# Patient Record
Sex: MALE | Race: OTHER | NOT HISPANIC OR LATINO | URBAN - METROPOLITAN AREA
[De-identification: names, ages, dates, MRNs, and addresses within clinical notes are randomized per-mention and may not be internally consistent; named-entity substitution may affect disease eponyms.]

---

## 2019-03-31 ENCOUNTER — EMERGENCY (EMERGENCY)
Facility: HOSPITAL | Age: 24
LOS: 1 days | Discharge: HOME | End: 2019-03-31
Admitting: EMERGENCY MEDICINE
Payer: SELF-PAY

## 2019-03-31 VITALS
HEART RATE: 65 BPM | SYSTOLIC BLOOD PRESSURE: 152 MMHG | TEMPERATURE: 98 F | DIASTOLIC BLOOD PRESSURE: 105 MMHG | RESPIRATION RATE: 18 BRPM | OXYGEN SATURATION: 98 %
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
## 2019-03-31 DIAGNOSIS — Z79.2 LONG TERM (CURRENT) USE OF ANTIBIOTICS: ICD-10-CM
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
## 2019-03-31 DIAGNOSIS — T15.01XA FOREIGN BODY IN CORNEA, RIGHT EYE, INITIAL ENCOUNTER: ICD-10-CM

## 2019-03-31 DIAGNOSIS — S05.01XA INJURY OF CONJUNCTIVA AND CORNEAL ABRASION WITHOUT FOREIGN BODY, RIGHT EYE, INITIAL ENCOUNTER: ICD-10-CM

## 2019-03-31 DIAGNOSIS — Z79.1 LONG TERM (CURRENT) USE OF NON-STEROIDAL ANTI-INFLAMMATORIES (NSAID): ICD-10-CM
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
## 2019-03-31 PROCEDURE — 99283 EMERGENCY DEPT VISIT LOW MDM: CPT

## 2019-03-31 RX ORDER — IBUPROFEN 200 MG
1 TABLET ORAL
Qty: 30 | Refills: 0
Start: 2019-03-31

## 2019-03-31 RX ORDER — TOBRAMYCIN 0.3 %
2 DROPS OPHTHALMIC (EYE)
Qty: 5 | Refills: 0
Start: 2019-03-31 | End: 2019-04-06

## 2019-03-31 RX ORDER — IBUPROFEN 200 MG
600 TABLET ORAL ONCE
Qty: 0 | Refills: 0 | Status: COMPLETED | OUTPATIENT
Start: 2019-03-31 | End: 2019-03-31

## 2019-03-31 RX ADMIN — Medication 600 MILLIGRAM(S): at 22:27

## 2019-03-31 NOTE — ED ADULT NURSE NOTE - NSIMPLEMENTINTERV_GEN_ALL_ED
Implemented All Universal Safety Interventions:  [place] to call system. Call bell, personal items and telephone within reach. Instruct patient to call for assistance. Room bathroom lighting operational. Non-slip footwear when patient is off stretcher. Physically safe environment: no spills, clutter or unnecessary equipment. Stretcher in lowest position, wheels locked, appropriate side rails in place.

## 2019-03-31 NOTE — ED PROVIDER NOTE - EYES, MLM
Clear bilaterally, pupils equal, round and reactive to light. +Minor corneal abrasion at 3 o clock of medial aspect of conjunctiva. Right eye vision: 20/20. No foreign body. +slight conjunctival erythema, no hyphema, no chemosis, no discharge.

## 2019-03-31 NOTE — ED PROVIDER NOTE - CARE PROVIDER_API CALL
Cesar Colin)  Ophthalmology  [address] [address]  Phone: [phone]  Fax: [phone]  Follow Up Time:

## 2019-03-31 NOTE — ED ADULT NURSE NOTE - CHIEF COMPLAINT QUOTE
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 Pt states he is sensitive to light. Pt denies blurry vision. Desi  id [id number].

## 2019-03-31 NOTE — ED PROVIDER NOTE - CLINICAL SUMMARY MEDICAL DECISION MAKING FREE TEXT BOX
Will Rx Tobramycin Ophthalmic, Motrin and instructions to F/U with Dr. Colin (Ophalmology) in 24 hours for reeval. Strict return precautions reviewed with pt in which pt verbalizes understanding and agrees to.

## 2019-03-31 NOTE — ED ADULT NURSE REASSESSMENT NOTE - NS ED NURSE REASSESS COMMENT FT1
used [facility] interpetreter service [id number] to go over medicine and discharge papers

## 2019-03-31 NOTE — ED PROVIDER NOTE - OBJECTIVE STATEMENT
23 y o male with no significant PMHx of presents to ED with c/o right eye pain, swelling, and tearing after getting debris in his eye yesterday. States he was working home and grinding a stone. Notes some associated sensitivity to light in the right eye. Denies blurry vision, headache, N/V, or other sx. Last tetanus December 2018. NKDA.

## 2019-03-31 NOTE — ED ADULT TRIAGE NOTE - CHIEF COMPLAINT QUOTE
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 Pt states he is sensitive to light. Pt denies blurry vision. Desi  id [id number].

## 2019-08-14 ENCOUNTER — EMERGENCY (EMERGENCY)
Facility: HOSPITAL | Age: 24
LOS: 1 days | Discharge: HOME | End: 2019-08-14
Admitting: EMERGENCY MEDICINE
Payer: SELF-PAY

## 2019-08-14 VITALS
OXYGEN SATURATION: 98 % | HEART RATE: 70 BPM | DIASTOLIC BLOOD PRESSURE: 78 MMHG | SYSTOLIC BLOOD PRESSURE: 145 MMHG | RESPIRATION RATE: 17 BRPM

## 2019-08-14 VITALS
TEMPERATURE: 98 F | DIASTOLIC BLOOD PRESSURE: 94 MMHG | OXYGEN SATURATION: 98 % | SYSTOLIC BLOOD PRESSURE: 154 MMHG | RESPIRATION RATE: 18 BRPM | HEART RATE: 65 BPM

## 2019-08-14 PROBLEM — Z78.9 OTHER SPECIFIED HEALTH STATUS: Chronic | Status: ACTIVE | Noted: 2019-03-31

## 2019-08-14 LAB
ALBUMIN SERPL ELPH-MCNC: 4.5 G/DL — SIGNIFICANT CHANGE UP (ref 3.4–5)
ALP SERPL-CCNC: 58 U/L — SIGNIFICANT CHANGE UP (ref 40–120)
ALT FLD-CCNC: 21 U/L — SIGNIFICANT CHANGE UP (ref 12–42)
ANION GAP SERPL CALC-SCNC: 6 MMOL/L — LOW (ref 9–16)
APTT BLD: 30 SEC — SIGNIFICANT CHANGE UP (ref 27.5–36.3)
AST SERPL-CCNC: 19 U/L — SIGNIFICANT CHANGE UP (ref 15–37)
BASOPHILS NFR BLD AUTO: 0.4 % — SIGNIFICANT CHANGE UP (ref 0–2)
BILIRUB SERPL-MCNC: 2.2 MG/DL — HIGH (ref 0.2–1.2)
BUN SERPL-MCNC: 15 MG/DL — SIGNIFICANT CHANGE UP (ref 7–23)
CALCIUM SERPL-MCNC: 9.3 MG/DL — SIGNIFICANT CHANGE UP (ref 8.5–10.5)
CHLORIDE SERPL-SCNC: 104 MMOL/L — SIGNIFICANT CHANGE UP (ref 96–108)
CO2 SERPL-SCNC: 29 MMOL/L — SIGNIFICANT CHANGE UP (ref 22–31)
CREAT SERPL-MCNC: 0.84 MG/DL — SIGNIFICANT CHANGE UP (ref 0.5–1.3)
EOSINOPHIL NFR BLD AUTO: 0.8 % — SIGNIFICANT CHANGE UP (ref 0–6)
GLUCOSE SERPL-MCNC: 110 MG/DL — HIGH (ref 70–99)
HCT VFR BLD CALC: 44.5 % — SIGNIFICANT CHANGE UP (ref 39–50)
HGB BLD-MCNC: 15.9 G/DL — SIGNIFICANT CHANGE UP (ref 13–17)
IMM GRANULOCYTES NFR BLD AUTO: 0.3 % — SIGNIFICANT CHANGE UP (ref 0–1.5)
INR BLD: 1.14 — SIGNIFICANT CHANGE UP (ref 0.88–1.16)
LYMPHOCYTES # BLD AUTO: 16.1 % — SIGNIFICANT CHANGE UP (ref 13–44)
MCHC RBC-ENTMCNC: 31.5 PG — SIGNIFICANT CHANGE UP (ref 27–34)
MCHC RBC-ENTMCNC: 35.7 G/DL — SIGNIFICANT CHANGE UP (ref 32–36)
MCV RBC AUTO: 88.1 FL — SIGNIFICANT CHANGE UP (ref 80–100)
MONOCYTES NFR BLD AUTO: 6.3 % — SIGNIFICANT CHANGE UP (ref 2–14)
NEUTROPHILS NFR BLD AUTO: 76.1 % — SIGNIFICANT CHANGE UP (ref 43–77)
PLATELET # BLD AUTO: 264 K/UL — SIGNIFICANT CHANGE UP (ref 150–400)
POTASSIUM SERPL-MCNC: 3.9 MMOL/L — SIGNIFICANT CHANGE UP (ref 3.5–5.3)
POTASSIUM SERPL-SCNC: 3.9 MMOL/L — SIGNIFICANT CHANGE UP (ref 3.5–5.3)
PROT SERPL-MCNC: 7.7 G/DL — SIGNIFICANT CHANGE UP (ref 6.4–8.2)
PROTHROM AB SERPL-ACNC: 12.7 SEC — SIGNIFICANT CHANGE UP (ref 10–12.9)
RBC # BLD: 5.05 M/UL — SIGNIFICANT CHANGE UP (ref 4.2–5.8)
RBC # FLD: 12.2 % — SIGNIFICANT CHANGE UP (ref 10.3–14.5)
SODIUM SERPL-SCNC: 139 MMOL/L — SIGNIFICANT CHANGE UP (ref 132–145)
WBC # BLD: 10.6 K/UL — HIGH (ref 3.8–10.5)
WBC # FLD AUTO: 10.6 K/UL — HIGH (ref 3.8–10.5)

## 2019-08-14 PROCEDURE — 70450 CT HEAD/BRAIN W/O DYE: CPT | Mod: 26

## 2019-08-14 PROCEDURE — 99284 EMERGENCY DEPT VISIT MOD MDM: CPT

## 2019-08-14 RX ORDER — SODIUM CHLORIDE 9 MG/ML
1000 INJECTION INTRAMUSCULAR; INTRAVENOUS; SUBCUTANEOUS ONCE
Refills: 0 | Status: COMPLETED | OUTPATIENT
Start: 2019-08-14 | End: 2019-08-14

## 2019-08-14 RX ORDER — METOCLOPRAMIDE HCL 10 MG
10 TABLET ORAL ONCE
Refills: 0 | Status: COMPLETED | OUTPATIENT
Start: 2019-08-14 | End: 2019-08-14

## 2019-08-14 RX ADMIN — Medication 10 MILLIGRAM(S): at 13:31

## 2019-08-14 RX ADMIN — SODIUM CHLORIDE 1000 MILLILITER(S): 9 INJECTION INTRAMUSCULAR; INTRAVENOUS; SUBCUTANEOUS at 13:31

## 2019-08-14 NOTE — ED ADULT NURSE NOTE - NSIMPLEMENTINTERV_GEN_ALL_ED
Implemented All Universal Safety Interventions:  [place] to call system. Call bell, personal items and telephone within reach. Instruct patient to call for assistance. Room bathroom lighting operational. Non-slip footwear when patient is off stretcher. Physically safe environment: no spills, clutter or unnecessary equipment. Stretcher in lowest position, wheels locked, appropriate side rails in place.

## 2019-08-14 NOTE — ED PROVIDER NOTE - NEUROLOGICAL, MLM
Patient is alert, oriented x person, place and time.  Cranial nerves 2-12 are intact.  Normal gait and speech.  Cerebellar testing normal:  negative Romberg, normal coordination and normal finger to nose, heal to shin and rapid alternating movements.  Normal proprioception and sensory exam.  No pronator drift.

## 2019-08-14 NOTE — ED PROVIDER NOTE - PROGRESS NOTE DETAILS
pain and nausea resolved after reglan. discuss results including elevated bili. will d/c to follow up with pmd. tolerating PO

## 2019-08-14 NOTE — ED PROVIDER NOTE - NSFOLLOWUPINSTRUCTIONS_ED_ALL_ED_FT
FOLLOW UP WITH YOUR PMD IN 2-3 DAYS. CALL TODAY FOR AN APPOINTMENT. TAKE COPY OF YOUR RESULTS WITH YOU TO DISCUSS WITH YOUR PMD, INCLUDING YOUR ELEVATED BILIRUBIN.     RETURN TO ER FOR ANY NEW OR CONCERNING SYMPTOMS.     Headache    A headache is pain or discomfort felt around the head or neck area. The specific cause of a headache may not be found as there are many types including tension headaches, migraine headaches, and cluster headaches. Watch your condition for any changes. Things you can do to manage your pain include taking over the counter and prescription medications as instructed by your health care provider, lying down in a dark quiet room, limiting stress, getting regular sleep, and refraining from alcohol and tobacco products.    SEEK IMMEDIATE MEDICAL CARE IF YOU HAVE ANY OF THE FOLLOWING SYMPTOMS: fever, vomiting, stiff neck, loss of vision, problems with speech, muscle weakness, loss of balance, trouble walking, passing out, or confusion.

## 2019-08-14 NOTE — ED ADULT NURSE NOTE - CHPI ED NUR SYMPTOMS NEG
no fever/no loss of consciousness/no dizziness/no numbness/no confusion/no weakness/no blurred vision/no change in level of consciousness

## 2019-08-14 NOTE — ED ADULT TRIAGE NOTE - CHIEF COMPLAINT QUOTE
Pt. Desi speaking  service Jenny #[id number] used for triage. Pt. c/o headache x2days accompanied by 2 episodes of vomiting today. Pt. denies any fall or head trauma. No relief with advil at home.

## 2019-08-14 NOTE — ED PROVIDER NOTE - OBJECTIVE STATEMENT
22 y/o otherwise Male presents to the ED with c/o a headache with nausea and vomiting. He reports a history of headaches but not typically with vomiting. He has not had any imaging done to evaluate his headaches. Endorses photophobia, feeling shaky and fatigue. Denies any bloody emesis, epistaxis, CP, SOB, abdominal pain, dizziness, vision changes, neck pain, back pain, numbness, tingling, fever, chills, gait changes, trauma, falls, alcohol or drug use. He took 2 Advil's at 9am today which did not provide any relief. :  [id number]

## 2019-08-14 NOTE — ED ADULT NURSE NOTE - OBJECTIVE STATEMENT
28 y/o male with headache since midnight last night associated with nausea and vomiting- Pt arrives A+Ox3 denies head injury, trauma or change in vision

## 2019-08-14 NOTE — ED ADULT NURSE NOTE - CHIEF COMPLAINT QUOTE
Pt. Desi speaking  service Jenny #[id number] used for triage. Pt. c/o headache x2days accompanied by 2 episodes of vomiting today. Pt. denies any fall or head trauma. No relief with advil at home.

## 2019-08-18 DIAGNOSIS — Z79.1 LONG TERM (CURRENT) USE OF NON-STEROIDAL ANTI-INFLAMMATORIES (NSAID): ICD-10-CM

## 2019-08-18 DIAGNOSIS — Z79.2 LONG TERM (CURRENT) USE OF ANTIBIOTICS: ICD-10-CM

## 2019-08-18 DIAGNOSIS — R51 HEADACHE: ICD-10-CM

## 2019-08-18 DIAGNOSIS — R11.2 NAUSEA WITH VOMITING, UNSPECIFIED: ICD-10-CM

## 2020-02-05 ENCOUNTER — EMERGENCY (EMERGENCY)
Facility: HOSPITAL | Age: 25
LOS: 1 days | Discharge: HOME | End: 2020-02-05
Attending: EMERGENCY MEDICINE | Admitting: EMERGENCY MEDICINE
Payer: MEDICAID

## 2020-02-05 VITALS
HEIGHT: 60 IN | TEMPERATURE: 98 F | HEART RATE: 65 BPM | DIASTOLIC BLOOD PRESSURE: 97 MMHG | WEIGHT: 158.73 LBS | RESPIRATION RATE: 16 BRPM | OXYGEN SATURATION: 100 % | SYSTOLIC BLOOD PRESSURE: 144 MMHG

## 2020-02-05 VITALS
SYSTOLIC BLOOD PRESSURE: 105 MMHG | HEART RATE: 56 BPM | OXYGEN SATURATION: 98 % | TEMPERATURE: 98 F | RESPIRATION RATE: 18 BRPM | DIASTOLIC BLOOD PRESSURE: 73 MMHG

## 2020-02-05 DIAGNOSIS — G44.209 TENSION-TYPE HEADACHE, UNSPECIFIED, NOT INTRACTABLE: ICD-10-CM

## 2020-02-05 DIAGNOSIS — R51 HEADACHE: ICD-10-CM

## 2020-02-05 PROCEDURE — 99283 EMERGENCY DEPT VISIT LOW MDM: CPT

## 2020-02-05 RX ORDER — IBUPROFEN 200 MG
600 TABLET ORAL ONCE
Refills: 0 | Status: COMPLETED | OUTPATIENT
Start: 2020-02-05 | End: 2020-02-05

## 2020-02-05 RX ORDER — ACETAMINOPHEN 500 MG
650 TABLET ORAL ONCE
Refills: 0 | Status: COMPLETED | OUTPATIENT
Start: 2020-02-05 | End: 2020-02-05

## 2020-02-05 RX ADMIN — Medication 600 MILLIGRAM(S): at 16:08

## 2020-02-05 RX ADMIN — Medication 650 MILLIGRAM(S): at 16:08

## 2020-02-05 NOTE — ED PROVIDER NOTE - CLINICAL SUMMARY MEDICAL DECISION MAKING FREE TEXT BOX
23 y/o M presents to ED c/o headache. Exam unremarkable, VS stable. 23 y/o M presents to ED c/o headache. Exam unremarkable, VS stable. Pt likely has tension headache. No clinical indication warranting a CT scan. Will give Ib Profen and Acetaminophen, discharge home. 23 y/o M presents to ED c/o headache.  Exam unremarkable, VS stable. Pt likely has tension headache. No clinical indication warranting a CT scan. Will give Ib Profen and Acetaminophen, discharge home.

## 2020-02-05 NOTE — ED PROVIDER NOTE - PATIENT PORTAL LINK FT
You can access the FollowMyHealth Patient Portal offered by [facility] by registering at the following website: [email or web address]. By joining [employer]’s FollowMyHealth portal, you will also be able to view your health information using other applications (apps) compatible with our system.

## 2020-02-05 NOTE — ED PROVIDER NOTE - NSFOLLOWUPINSTRUCTIONS_ED_ALL_ED_FT
Tension Headache, Adult  A tension headache is a feeling of pain, pressure, or aching in the head that is often felt over the front and sides of the head. The pain can be dull, or it can feel tight (constricting). There are two types of tension headache:  Episodic tension headache. This is when the headaches happen fewer than 15 days a month.Chronic tension headache. This is when the headaches happen more than 15 days a month during a 3-month period.A tension headache can last from 30 minutes to several days. It is the most common kind of headache. Tension headaches are not normally associated with nausea or vomiting, and they do not get worse with physical activity.  What are the causes?  The exact cause of this condition is not known. Tension headaches are often triggered by stress, anxiety, or depression. Other triggers include:  Alcohol.Too much caffeine or caffeine withdrawal.Respiratory infections, such as colds, flu, or sinus infections.Dental problems or teeth clenching.Tiredness (fatigue).Holding your head and neck in the same position for a long period of time, such as while using a computer.Smoking.Arthritis of the neck.What are the signs or symptoms?  Symptoms of this condition include:  A feeling of pressure or tightness around the head.Dull, aching head pain.Pain over the front and sides of the head.Tenderness in the muscles of the head, neck, and shoulders.How is this diagnosed?  This condition may be diagnosed based on your symptoms, your medical history, and a physical exam. If your symptoms are severe or unusual, you may have imaging tests, such as a CT scan or an MRI of your head. Your vision may also be checked.  How is this treated?  This condition may be treated with lifestyle changes and with medicines that help relieve symptoms.  Follow these instructions at home:  Managing pain     Take over-the-counter and prescription medicines only as told by your health care provider.When you have a headache, lie down in a dark, quiet room.If directed, apply ice to the head and neck:  Put ice in a plastic bag.Place a towel between your skin and the bag.Leave the ice on for 20 minutes, 2–3 times a day.If directed, apply heat to the back of your neck as often as told by your health care provider. Use the heat source that your health care provider recommends, such as a moist heat pack or a heating pad.  Place a towel between your skin and the heat source.Leave the heat on for 20–30 minutes.Remove the heat if your skin turns bright red. This is especially important if you are unable to feel pain, heat, or cold. You may have a greater risk of getting burned.Eating and drinking     Eat meals on a regular schedule.Limit alcohol intake to no more than 1 drink a day for nonpregnant women and 2 drinks a day for men. One drink equals 12 oz of beer, 5 oz of wine, or 1½ oz of hard liquor.Drink enough fluid to keep your urine pale yellow.Decrease your caffeine intake, or stop using caffeine.Lifestyle     Get 7–9 hours of sleep each night, or get the amount of sleep recommended by your health care provider.At bedtime, remove all electronic devices from your room. Electronic devices include computers, phones, and tablets.Find ways to manage your stress. Some things that can help relieve stress include:  Exercise.Deep breathing exercises.Yoga.Listening to music.Positive mental imagery.Try to sit up straight and avoid tensing your muscles.Do not use any products that contain nicotine or tobacco, such as cigarettes and e-cigarettes. If you need help quitting, ask your health care provider.General instructions        Keep all follow-up visits as told by your health care provider. This is important.Avoid any headache triggers. Keep a headache journal to help find out what may trigger your headaches. For example, write down:  What you eat and drink.How much sleep you get.Any change to your diet or medicines.Contact a health care provider if:  Your headache does not get better.Your headache comes back.You are sensitive to sounds, light, or smells because of a headache.You have nausea or you vomit.Your stomach hurts.Get help right away if:  You suddenly develop a very severe headache along with any of the following:  A stiff neck.Nausea and vomiting.Confusion.Weakness.Double vision or loss of vision.Shortness of breath.Rash.Unusual sleepiness.Fever.Trouble speaking.Pain in your eyes or ears.Trouble walking or balancing.Feeling faint or passing out.Summary  A tension headache is a feeling of pain, pressure, or aching in the head that is often felt over the front and sides of the head.A tension headache can last from 30 minutes to several days. It is the most common kind of headache.This condition may be diagnosed based on your symptoms, your medical history, and a physical exam.This condition may be treated with lifestyle changes and with medicines that help relieve symptoms.This information is not intended to replace advice given to you by your health care provider. Make sure you discuss any questions you have with your health care provider.    Document Released: 12/18/2006 Document Revised: 03/30/2018 Document Reviewed: 03/30/2018  [employer] Interactive Patient Education © 2019 [employer]

## 2020-02-05 NOTE — ED PROVIDER NOTE - NEUROLOGICAL, MLM
Patient is alert, oriented x person, place and time.  Cranial nerves 2-12 are intact.  5/5 bl upper extremity and lower extremity strength.

## 2020-02-05 NOTE — ED PROVIDER NOTE - OBJECTIVE STATEMENT
23 y/o M with no PMHx presents to ED c/o burning frontal and temporal headaches for the past 2 weeks. Pt speaks Swedish; history was taken via  (ID: [id number]). Pt took Paracetamol at home which improved his sx. Last Paracetamol intake was 5 days ago. Denies head trama, blurred vision, fever, chills, N/V, abd pain, chest pain, SOB, numbness or tingling of extremities, back pain, neck pain, sick contacts, or recent travel.

## 2020-02-05 NOTE — ED ADULT TRIAGE NOTE - CHIEF COMPLAINT QUOTE
walk in with c/o HA x2weeks, constant, no hx of similar, no n/v or blurry vision, no photophobia, no head injury.

## 2020-02-05 NOTE — ED PROVIDER NOTE - CHPI ED SYMPTOMS NEG
no nausea/no numbness/no blurred vision/no fever/no vomiting/no chills, no abd pain, no chest pain, no SOB, no tingling, no back pain, no neck pain

## 2020-10-20 NOTE — ED ADULT NURSE NOTE - NS ED NURSE DISCH DISPOSITION
Discharged Dapsone Counseling: I discussed with the patient the risks of dapsone including but not limited to hemolytic anemia, agranulocytosis, rashes, methemoglobinemia, kidney failure, peripheral neuropathy, headaches, GI upset, and liver toxicity.  Patients who start dapsone require monitoring including baseline LFTs and weekly CBCs for the first month, then every month thereafter.  The patient verbalized understanding of the proper use and possible adverse effects of dapsone.  All of the patient's questions and concerns were addressed.

## 2021-01-11 ENCOUNTER — EMERGENCY (EMERGENCY)
Facility: HOSPITAL | Age: 26
LOS: 1 days | Discharge: HOME | End: 2021-01-11
Admitting: EMERGENCY MEDICINE
Payer: MEDICAID

## 2021-01-11 VITALS
OXYGEN SATURATION: 98 % | TEMPERATURE: 98 F | HEIGHT: 60 IN | RESPIRATION RATE: 16 BRPM | HEART RATE: 94 BPM | DIASTOLIC BLOOD PRESSURE: 91 MMHG | SYSTOLIC BLOOD PRESSURE: 157 MMHG

## 2021-01-11 DIAGNOSIS — R51.9 HEADACHE, UNSPECIFIED: ICD-10-CM

## 2021-01-11 DIAGNOSIS — Z20.822 CONTACT WITH AND (SUSPECTED) EXPOSURE TO COVID-19: ICD-10-CM

## 2021-01-11 PROCEDURE — 99284 EMERGENCY DEPT VISIT MOD MDM: CPT

## 2021-01-11 NOTE — ED ADULT TRIAGE NOTE - CHIEF COMPLAINT QUOTE
Desi  PAIGE [id number] [birth year] hx of dizziness, fevers, frontal headache, no family testing positive for covid , nil pmhx,nil meds,

## 2021-01-12 RX ORDER — ACETAMINOPHEN 500 MG
650 TABLET ORAL ONCE
Refills: 0 | Status: COMPLETED | OUTPATIENT
Start: 2021-01-12 | End: 2021-01-12

## 2021-01-12 RX ADMIN — Medication 650 MILLIGRAM(S): at 00:30

## 2021-01-12 NOTE — ED PROVIDER NOTE - OBJECTIVE STATEMENT
25 year old male with no PMhx presents for evaluation. no family testing positive for covid. reports headache.   no recent travel. requesting covid swab.   Denies sore throat, cough, SOB, CP, palpitations, wheezing, abdominal pain, N/V/D/C, change in urinary/bowel function, dysuria, hematuria, flank pain, malaise, rash, HA, and dizziness.  No recent travel or sick contact noted.    Desi  PAIGE [id number]

## 2021-01-12 NOTE — ED ADULT NURSE NOTE - CHIEF COMPLAINT QUOTE
Desi  PAIGE [id number] [birth year] hx of dizziness, fevers, frontal headache, no family testing positive for covid , nil pmhx,nil meds,

## 2021-01-12 NOTE — ED PROVIDER NOTE - NSFOLLOWUPINSTRUCTIONS_ED_ALL_ED_FT
IF YOU DO NOT GET YOUR RESULTS WITHIN 48 HOURS PLEASE CALL [phone].    IF YOUR RESULT COMES BACK POSITIVE:    1. STAY HOME for 14 DAYS  2. Minimize Human contact to ONLY ESSENTIAL  3. Every time you wash your hands, sing the HAPPY BIRTHDAY Song so you know you're washing long enough.  Make sure to scrub the webspace between your fingers.  4. DRINK 1-3 Liters of fluids day x at least 5 days.  To remain hydrated. Your fatigue, lightheadedness, and body aches will decrease and your fever has a better chance of breaking if you are well hydrated.    5. For your Fever and Body aches takes Tylenol 650-100mg every 4-6h (max 4000mg/day). Try not to use ibuprofen, aspirin or naproxen (Advil, Motrin or Aleve) as these may worsen Coronavirus infection.  6. RETURN TO THE ER IMMEDIATELY IF YOU HAVE WORSENING SHORTNESS OF BREATH. SYMPTOMS USUALLY PEAK BETWEEN DAY 7-10.

## 2021-01-12 NOTE — ED PROVIDER NOTE - PHYSICAL EXAMINATION
VITAL SIGNS: I have reviewed nursing notes and confirm.  CONSTITUTIONAL: Well-developed; well-nourished; in no acute distress.  SKIN: Skin is warm and dry, no acute rash.  HEAD: Normocephalic; atraumatic.  EYES: PERRL, EOM intact; conjunctiva and sclera clear.  EARS: tympanic membranes clear bilaterally, No redness, normal landmarks and light reflexes, canal clear without cerumen impaction  THROAT: moist mucous membranes, normal dentition, no erythema, no swelling, no exudates, no drooling, no PTA, uvula midline  NECK: Supple; non tender. no nuchal rigidity   CARD: S1, S2 normal; no murmurs, gallops, or rubs. Regular rate and rhythm.  RESP: No wheezes, rales or rhonchi.  ABD: Normal bowel sounds; soft; non-distended; non-tender;  no palpable or pulsating mass; no hepatosplenomegaly.  EXT: Normal ROM. No clubbing, cyanosis or edema.  NEURO: Alert, oriented. Grossly unremarkable.  PSYCH: Cooperative, appropriate.

## 2021-01-12 NOTE — ED PROVIDER NOTE - CLINICAL SUMMARY MEDICAL DECISION MAKING FREE TEXT BOX
COVID like symptoms. will send swab. tylenol. no sign of nuchal rigidity.  discussed care/symptoms/ quarantine

## 2021-01-12 NOTE — ED PROVIDER NOTE - PATIENT PORTAL LINK FT
You can access the FollowMyHealth Patient Portal offered by [facility] by registering at the following website: [email or web address]. By joining [employer]’s FollowMyHealth portal, you will also be able to view your health information using other applications (apps) compatible with our system.

## 2021-06-04 NOTE — ED ADULT NURSE NOTE - NS ED NOTE ABUSE SUSPICION NEGLECT YN
[Alert] : alert [No Acute Distress] : no acute distress [Normocephalic] : normocephalic [Conjunctivae with no discharge] : conjunctivae with no discharge [PERRL] : PERRL [EOMI Bilateral] : EOMI bilateral [Auricles Well Formed] : auricles well formed [Clear Tympanic membranes with present light reflex and bony landmarks] : clear tympanic membranes with present light reflex and bony landmarks [No Discharge] : no discharge [Nares Patent] : nares patent [Pink Nasal Mucosa] : pink nasal mucosa [Palate Intact] : palate intact [Nonerythematous Oropharynx] : nonerythematous oropharynx [Supple, full passive range of motion] : supple, full passive range of motion [No Palpable Masses] : no palpable masses [Symmetric Chest Rise] : symmetric chest rise [Clear to Auscultation Bilaterally] : clear to auscultation bilaterally [Regular Rate and Rhythm] : regular rate and rhythm [Normal S1, S2 present] : normal S1, S2 present [No Murmurs] : no murmurs [+2 Femoral Pulses] : +2 femoral pulses [Soft] : soft [NonTender] : non tender [Non Distended] : non distended [No Hepatomegaly] : no hepatomegaly [No Splenomegaly] : no splenomegaly [No Abnormal Lymph Nodes Palpated] : no abnormal lymph nodes palpated [No Gait Asymmetry] : no gait asymmetry [Normal Muscle Tone] : normal muscle tone [Straight] : straight [+2 Patella DTR] : +2 patella DTR [No Rash or Lesions] : no rash or lesions [FreeTextEntry6] : External Genitalia: Visual inspection WNL  No [de-identified] : Evaluation for scoliosis:  No scoliosis on exam, ( Normal Wilde Forward Bend Test).

## 2021-07-20 ENCOUNTER — EMERGENCY (EMERGENCY)
Facility: HOSPITAL | Age: 26
LOS: 1 days | Discharge: HOME | End: 2021-07-20
Attending: EMERGENCY MEDICINE | Admitting: EMERGENCY MEDICINE
Payer: MEDICAID

## 2021-07-20 VITALS
OXYGEN SATURATION: 98 % | DIASTOLIC BLOOD PRESSURE: 88 MMHG | RESPIRATION RATE: 16 BRPM | HEART RATE: 82 BPM | TEMPERATURE: 98 F | WEIGHT: 171.96 LBS | SYSTOLIC BLOOD PRESSURE: 152 MMHG | HEIGHT: 60 IN

## 2021-07-20 VITALS
DIASTOLIC BLOOD PRESSURE: 86 MMHG | RESPIRATION RATE: 16 BRPM | SYSTOLIC BLOOD PRESSURE: 139 MMHG | TEMPERATURE: 98 F | OXYGEN SATURATION: 98 % | HEART RATE: 65 BPM

## 2021-07-20 DIAGNOSIS — R51.9 HEADACHE, UNSPECIFIED: ICD-10-CM

## 2021-07-20 DIAGNOSIS — R10.13 EPIGASTRIC PAIN: ICD-10-CM

## 2021-07-20 PROCEDURE — 93010 ELECTROCARDIOGRAM REPORT: CPT

## 2021-07-20 PROCEDURE — 99284 EMERGENCY DEPT VISIT MOD MDM: CPT

## 2021-07-20 RX ORDER — FAMOTIDINE 10 MG/ML
20 INJECTION INTRAVENOUS ONCE
Refills: 0 | Status: COMPLETED | OUTPATIENT
Start: 2021-07-20 | End: 2021-07-20

## 2021-07-20 RX ORDER — ACETAMINOPHEN 500 MG
975 TABLET ORAL ONCE
Refills: 0 | Status: COMPLETED | OUTPATIENT
Start: 2021-07-20 | End: 2021-07-20

## 2021-07-20 RX ORDER — METHOCARBAMOL 500 MG/1
1500 TABLET, FILM COATED ORAL ONCE
Refills: 0 | Status: COMPLETED | OUTPATIENT
Start: 2021-07-20 | End: 2021-07-20

## 2021-07-20 RX ORDER — FAMOTIDINE 10 MG/ML
1 INJECTION INTRAVENOUS
Qty: 14 | Refills: 0
Start: 2021-07-20 | End: 2021-08-02

## 2021-07-20 RX ORDER — LIDOCAINE 4 G/100G
5 CREAM TOPICAL ONCE
Refills: 0 | Status: COMPLETED | OUTPATIENT
Start: 2021-07-20 | End: 2021-07-20

## 2021-07-20 RX ORDER — IBUPROFEN 200 MG
600 TABLET ORAL ONCE
Refills: 0 | Status: COMPLETED | OUTPATIENT
Start: 2021-07-20 | End: 2021-07-20

## 2021-07-20 RX ADMIN — FAMOTIDINE 20 MILLIGRAM(S): 10 INJECTION INTRAVENOUS at 20:02

## 2021-07-20 RX ADMIN — Medication 600 MILLIGRAM(S): at 20:02

## 2021-07-20 RX ADMIN — Medication 30 MILLILITER(S): at 20:01

## 2021-07-20 RX ADMIN — METHOCARBAMOL 1500 MILLIGRAM(S): 500 TABLET, FILM COATED ORAL at 20:27

## 2021-07-20 RX ADMIN — Medication 975 MILLIGRAM(S): at 20:01

## 2021-07-20 RX ADMIN — LIDOCAINE 5 MILLILITER(S): 4 CREAM TOPICAL at 20:02

## 2021-07-20 NOTE — ED PROVIDER NOTE - PATIENT PORTAL LINK FT
You can access the FollowMyHealth Patient Portal offered by [facility] by registering at the following website: [email or web address]. By joining [employer]’s FollowMyHealth portal, you will also be able to view your health information using other applications (apps) compatible with our system.

## 2021-07-20 NOTE — ED ADULT TRIAGE NOTE - BP NONINVASIVE DIASTOLIC (MM HG)
88 Ear Star Wedge Flap Text: The defect edges were debeveled with a #15 blade scalpel.  Given the location of the defect and the proximity to free margins (helical rim) an ear star wedge flap was deemed most appropriate.  Using a sterile surgical marker, the appropriate flap was drawn incorporating the defect and placing the expected incisions between the helical rim and antihelix where possible.  The area thus outlined was incised through and through with a #15 scalpel blade.

## 2021-07-20 NOTE — ED PROVIDER NOTE - PHYSICAL EXAMINATION
VITAL SIGNS: I have reviewed nursing notes and confirm.  CONSTITUTIONAL: Well-developed; well-nourished; in no acute distress.  SKIN: Skin is warm and dry, no acute rash.  HEAD: Normocephalic; atraumatic.  EYES: Pupils round bilaterally, 3mm; conjunctiva and sclera clear.  ENT: No nasal discharge; airway clear, MMM.  NECK: Supple; non tender.  CARD: S1, S2 normal; no murmurs, gallops, or rubs. Regular rate and rhythm.  RESP: No wheezes, rales or rhonchi.  ABD: Soft; non-distended; non-tender; no rebound or guarding.  EXT: Normal ROM.   NEURO: Alert, oriented. Grossly unremarkable. ONEAL, normal tone, no gross motor or sensory changes. Fluent speech.   PSYCH: Cooperative, appropriate. Mood and affect wnl.

## 2021-07-20 NOTE — ED PROVIDER NOTE - NSFOLLOWUPINSTRUCTIONS_ED_ALL_ED_FT
Dhimbje tori    Një dhimbje tori është dhimbje ose shqetësim që ndihet rreth zonës së kokës ose qafës. Shkaku specifik i një jose tori mund të mos gjendet pasi ka shumë lloje morris përfshirë dhjose tori tensioni, dhimbje tori migrene dhe [facility] tori grupore. Shikoni gjendjen tuaj për çdo ndryshim. Gjërat që mund të bëni për të menaxhuar [facility] përfshijnë marrjen e banakut dhe ilaçeve me recetë siç udhëzohet david ofruesi juaj i kujdesit shëndetësor, shtrirja në një dhomë të errët të qetë, kufizimi i stresit, gjumi i rregullt dhe largimi david alkooli dhe produktet e duhanit.    KEKRKONI KUJDES MJEKSOR T IM MENJHERSHM N ISE BRIANDA NDONJ OFN SIMPTOMMSN T F VJETM: ethe, të vjella, ngurtësim të qafës, humbje të shikimit, probleme me të folurën, dobësi të muskujve, humbje të ekuilibrit, probleme në ecje, humbje ose konfuzion.    Për [facility] të barkut:    Ju lutemi ndiqni një dietë të butë.  Merrni dhe bllokues të acidit për 7-14 ditë (famotidine)  Shmangni ushqimet e yndyrshme ose me erëza.    OTC Motrin / Advil (ibuprofen) 600mg çdo 6 orë dhe / ose Tylenol (acetaminophen) 1000mg çdo 6h për [facility].  Kthehuni për [facility] më të keqe, simptoma të reja shqetësuese ose urgjenca të tjera mjekësore.      Headache    A headache is pain or discomfort felt around the head or neck area. The specific cause of a headache may not be found as there are many types including tension headaches, migraine headaches, and cluster headaches. Watch your condition for any changes. Things you can do to manage your pain include taking over the counter and prescription medications as instructed by your health care provider, lying down in a dark quiet room, limiting stress, getting regular sleep, and refraining from alcohol and tobacco products.    SEEK IMMEDIATE MEDICAL CARE IF YOU HAVE ANY OF THE FOLLOWING SYMPTOMS: fever, vomiting, stiff neck, loss of vision, problems with speech, muscle weakness, loss of balance, trouble walking, passing out, or confusion.     For your abdominal pain:     Please follow a bland diet.   Take and acid blocker for 7-14 days (famotidine)  Avoid greasy or spice foods.     OTC Motrin/Advil (ibuprofen) 600mg every 6h and/or Tylenol (acetaminophen) 1000mg every 6h for pain.   Return for worse pain, new worrisome symptoms or other medical emergencies.

## 2021-07-20 NOTE — ED PROVIDER NOTE - CLINICAL SUMMARY MEDICAL DECISION MAKING FREE TEXT BOX
Patient presenting with headache and epigastric pain- sx appear mild overall (but are strong for him), hasn't tried OTC meds. Will give PO headache meds and GI cocktail and reassess. Patient presenting with tension like headache and epigastric pain- sx appear mild overall (but are strong for him), hasn't tried OTC meds. Will give PO headache meds and GI cocktail and reassess.

## 2021-07-20 NOTE — ED PROVIDER NOTE - OBJECTIVE STATEMENT
25 M presenting with bifrontal headache since 1pm. No other assocated neuro sx and no n/v. He also has some epigastric pain (~ same time). This pain is worse with a deep breath. He gets headaches sometime, not usually this bad.  He did not take any pain meds for it. No neck stiffness or pain and no photophobia.     Interviewed with RN using language services for [facility].    Not vaccinated for COVID

## 2021-07-20 NOTE — ED ADULT NURSE NOTE - OBJECTIVE STATEMENT
Desi  : [id number] Batsheva. Pt complaining of headache and stomach pain that started today around 1pm. Pt states that when he takes deep  breath he is having stomach pain. Pt denies fever and neck pain. Pt unvaccinated against COVID 19.

## 2021-11-30 ENCOUNTER — EMERGENCY (EMERGENCY)
Facility: HOSPITAL | Age: 26
LOS: 1 days | Discharge: HOME | End: 2021-11-30
Admitting: EMERGENCY MEDICINE
Payer: SELF-PAY

## 2021-11-30 VITALS
HEART RATE: 84 BPM | OXYGEN SATURATION: 100 % | DIASTOLIC BLOOD PRESSURE: 91 MMHG | SYSTOLIC BLOOD PRESSURE: 141 MMHG | RESPIRATION RATE: 18 BRPM | TEMPERATURE: 98 F

## 2021-11-30 VITALS
SYSTOLIC BLOOD PRESSURE: 135 MMHG | DIASTOLIC BLOOD PRESSURE: 80 MMHG | HEART RATE: 90 BPM | OXYGEN SATURATION: 98 % | TEMPERATURE: 99 F | RESPIRATION RATE: 16 BRPM

## 2021-11-30 DIAGNOSIS — J11.1 INFLUENZA DUE TO UNIDENTIFIED INFLUENZA VIRUS WITH OTHER RESPIRATORY MANIFESTATIONS: ICD-10-CM

## 2021-11-30 DIAGNOSIS — R68.83 CHILLS (WITHOUT FEVER): ICD-10-CM

## 2021-11-30 DIAGNOSIS — S09.90XA UNSPECIFIED INJURY OF HEAD, INITIAL ENCOUNTER: ICD-10-CM

## 2021-11-30 DIAGNOSIS — R51.9 HEADACHE, UNSPECIFIED: ICD-10-CM

## 2021-11-30 DIAGNOSIS — Z20.822 CONTACT WITH AND (SUSPECTED) EXPOSURE TO COVID-19: ICD-10-CM

## 2021-11-30 LAB
ANION GAP SERPL CALC-SCNC: 10 MMOL/L — SIGNIFICANT CHANGE UP (ref 9–16)
BUN SERPL-MCNC: 14 MG/DL — SIGNIFICANT CHANGE UP (ref 7–23)
CALCIUM SERPL-MCNC: 9.1 MG/DL — SIGNIFICANT CHANGE UP (ref 8.5–10.5)
CHLORIDE SERPL-SCNC: 100 MMOL/L — SIGNIFICANT CHANGE UP (ref 96–108)
CO2 SERPL-SCNC: 30 MMOL/L — SIGNIFICANT CHANGE UP (ref 22–31)
CREAT SERPL-MCNC: 0.98 MG/DL — SIGNIFICANT CHANGE UP (ref 0.5–1.3)
FLUAV H1 2009 PAND RNA SPEC QL NAA+PROBE: SIGNIFICANT CHANGE UP
FLUAV H1 RNA SPEC QL NAA+PROBE: SIGNIFICANT CHANGE UP
FLUAV H3 RNA SPEC QL NAA+PROBE: SIGNIFICANT CHANGE UP
FLUAV SUBTYP SPEC NAA+PROBE: DETECTED
FLUBV RNA SPEC QL NAA+PROBE: SIGNIFICANT CHANGE UP
GLUCOSE SERPL-MCNC: 101 MG/DL — HIGH (ref 70–99)
HCT VFR BLD CALC: 44.2 % — SIGNIFICANT CHANGE UP (ref 39–50)
HGB BLD-MCNC: 15.4 G/DL — SIGNIFICANT CHANGE UP (ref 13–17)
MCHC RBC-ENTMCNC: 30.9 PG — SIGNIFICANT CHANGE UP (ref 27–34)
MCHC RBC-ENTMCNC: 34.8 GM/DL — SIGNIFICANT CHANGE UP (ref 32–36)
MCV RBC AUTO: 88.6 FL — SIGNIFICANT CHANGE UP (ref 80–100)
NRBC # BLD: 0 /100 WBCS — SIGNIFICANT CHANGE UP (ref 0–0)
PLATELET # BLD AUTO: 224 K/UL — SIGNIFICANT CHANGE UP (ref 150–400)
POTASSIUM SERPL-MCNC: 3.9 MMOL/L — SIGNIFICANT CHANGE UP (ref 3.5–5.3)
POTASSIUM SERPL-SCNC: 3.9 MMOL/L — SIGNIFICANT CHANGE UP (ref 3.5–5.3)
RAPID RVP RESULT: DETECTED
RBC # BLD: 4.99 M/UL — SIGNIFICANT CHANGE UP (ref 4.2–5.8)
RBC # FLD: 12.5 % — SIGNIFICANT CHANGE UP (ref 10.3–14.5)
SARS-COV-2 RNA SPEC QL NAA+PROBE: SIGNIFICANT CHANGE UP
SODIUM SERPL-SCNC: 140 MMOL/L — SIGNIFICANT CHANGE UP (ref 132–145)
WBC # BLD: 8.3 K/UL — SIGNIFICANT CHANGE UP (ref 3.8–10.5)
WBC # FLD AUTO: 8.3 K/UL — SIGNIFICANT CHANGE UP (ref 3.8–10.5)

## 2021-11-30 PROCEDURE — 99285 EMERGENCY DEPT VISIT HI MDM: CPT | Mod: 25

## 2021-11-30 PROCEDURE — 71045 X-RAY EXAM CHEST 1 VIEW: CPT | Mod: 26

## 2021-11-30 PROCEDURE — 70450 CT HEAD/BRAIN W/O DYE: CPT | Mod: 26

## 2021-11-30 RX ORDER — METOCLOPRAMIDE HCL 10 MG
10 TABLET ORAL ONCE
Refills: 0 | Status: COMPLETED | OUTPATIENT
Start: 2021-11-30 | End: 2021-11-30

## 2021-11-30 RX ORDER — ACETAMINOPHEN 500 MG
650 TABLET ORAL ONCE
Refills: 0 | Status: COMPLETED | OUTPATIENT
Start: 2021-11-30 | End: 2021-11-30

## 2021-11-30 RX ORDER — KETOROLAC TROMETHAMINE 30 MG/ML
15 SYRINGE (ML) INJECTION ONCE
Refills: 0 | Status: DISCONTINUED | OUTPATIENT
Start: 2021-11-30 | End: 2021-11-30

## 2021-11-30 RX ORDER — SODIUM CHLORIDE 9 MG/ML
1000 INJECTION INTRAMUSCULAR; INTRAVENOUS; SUBCUTANEOUS ONCE
Refills: 0 | Status: COMPLETED | OUTPATIENT
Start: 2021-11-30 | End: 2021-11-30

## 2021-11-30 RX ADMIN — Medication 10 MILLIGRAM(S): at 19:21

## 2021-11-30 RX ADMIN — Medication 75 MILLIGRAM(S): at 22:03

## 2021-11-30 RX ADMIN — SODIUM CHLORIDE 1000 MILLILITER(S): 9 INJECTION INTRAMUSCULAR; INTRAVENOUS; SUBCUTANEOUS at 19:28

## 2021-11-30 RX ADMIN — Medication 650 MILLIGRAM(S): at 19:20

## 2021-11-30 NOTE — ED PROVIDER NOTE - CLINICAL SUMMARY MEDICAL DECISION MAKING FREE TEXT BOX
26 year old male seen in the ed for headache, chills  ct scan done due to headache being worse than normal , and was negative  covid neg  pt was flu positive  pt has not had flu vaccine yet   felt better after tylenol, fluids, reglan  dc home with tamiflu

## 2021-11-30 NOTE — ED PROVIDER NOTE - NSFOLLOWUPINSTRUCTIONS_ED_ALL_ED_FT
Please take tamiflu as instructed    Take tylenol or motrin with food for fever      return to the ed for any worsening or alarming symptoms

## 2021-11-30 NOTE — ED PROVIDER NOTE - OBJECTIVE STATEMENT
27 y/o male with no PMHx presents to the ED complaining of a headache that started at 10 am this morning. Patient states he took 2 advil this morning with no improvement. Patient states he has had headaches in the past, but has never experienced a headache this severe. Patient states he is also dizzy, and describes the headache as diffuse across the head. He is also complaining of body aches. Patient also states he felt nauseous and vomited 3 times prior to arrival. Patient denies injury, but states he feels like his vision is "going out in the middle" with mild photophobia but no blurry vision. Patient denies fever, chills, shortness of breath, nasal congestion, abdominal pain or diarrhea.

## 2021-11-30 NOTE — ED PROVIDER NOTE - NEUROLOGICAL, MLM
Nonfocal neuro exam. Alert and oriented, no focal deficits, no motor or sensory deficits. Nonfocal neuro exam. Alert and oriented, no focal deficits, no motor or sensory deficits, neck supple, full rom

## 2021-11-30 NOTE — ED PROVIDER NOTE - PATIENT PORTAL LINK FT
You can access the FollowMyHealth Patient Portal offered by [facility] by registering at the following website: [email or web address]. By joining [facility]’s FollowMyHealth portal, you will also be able to view your health information using other applications (apps) compatible with our system.

## 2023-04-03 ENCOUNTER — EMERGENCY (EMERGENCY)
Facility: HOSPITAL | Age: 28
LOS: 1 days | Discharge: LEFT BEFORE DISCHARGE | End: 2023-04-03
Admitting: EMERGENCY MEDICINE
Payer: MEDICAID

## 2023-04-03 PROBLEM — Z78.9 OTHER SPECIFIED HEALTH STATUS: Chronic | Status: ACTIVE | Noted: 2021-11-30

## 2023-04-03 PROCEDURE — L9991: CPT

## 2023-04-03 NOTE — ED ADULT NURSE NOTE - EXPLANATION OF PATIENT'S REASON FOR LEAVING
pt entered dept pretending not to speak english, used Pakistani  and he stated he was looking for a police man to file a report

## 2023-04-04 ENCOUNTER — INPATIENT (INPATIENT)
Facility: HOSPITAL | Age: 28
LOS: 1 days | Discharge: ANOTHER HOSPITAL | DRG: 885 | End: 2023-04-06
Attending: PSYCHIATRY & NEUROLOGY | Admitting: PSYCHIATRY & NEUROLOGY
Payer: COMMERCIAL

## 2023-04-04 VITALS
TEMPERATURE: 98 F | HEART RATE: 84 BPM | OXYGEN SATURATION: 99 % | HEIGHT: 68 IN | WEIGHT: 164.91 LBS | SYSTOLIC BLOOD PRESSURE: 147 MMHG | RESPIRATION RATE: 16 BRPM | DIASTOLIC BLOOD PRESSURE: 99 MMHG

## 2023-04-04 DIAGNOSIS — F29 UNSPECIFIED PSYCHOSIS NOT DUE TO A SUBSTANCE OR KNOWN PHYSIOLOGICAL CONDITION: ICD-10-CM

## 2023-04-04 LAB
ALBUMIN SERPL ELPH-MCNC: 4.8 G/DL — SIGNIFICANT CHANGE UP (ref 3.4–5)
ALP SERPL-CCNC: 64 U/L — SIGNIFICANT CHANGE UP (ref 40–120)
ALT FLD-CCNC: 39 U/L — SIGNIFICANT CHANGE UP (ref 12–42)
ANION GAP SERPL CALC-SCNC: 9 MMOL/L — SIGNIFICANT CHANGE UP (ref 9–16)
APAP SERPL-MCNC: <2 UG/ML — LOW (ref 10–30)
APPEARANCE UR: CLEAR — SIGNIFICANT CHANGE UP
AST SERPL-CCNC: 25 U/L — SIGNIFICANT CHANGE UP (ref 15–37)
BASOPHILS # BLD AUTO: 0.03 K/UL — SIGNIFICANT CHANGE UP (ref 0–0.2)
BASOPHILS NFR BLD AUTO: 0.3 % — SIGNIFICANT CHANGE UP (ref 0–2)
BILIRUB SERPL-MCNC: 2.8 MG/DL — HIGH (ref 0.2–1.2)
BILIRUB UR-MCNC: ABNORMAL
BUN SERPL-MCNC: 16 MG/DL — SIGNIFICANT CHANGE UP (ref 7–23)
CALCIUM SERPL-MCNC: 9.5 MG/DL — SIGNIFICANT CHANGE UP (ref 8.5–10.5)
CHLORIDE SERPL-SCNC: 102 MMOL/L — SIGNIFICANT CHANGE UP (ref 96–108)
CO2 SERPL-SCNC: 28 MMOL/L — SIGNIFICANT CHANGE UP (ref 22–31)
COLOR SPEC: YELLOW — SIGNIFICANT CHANGE UP
CREAT SERPL-MCNC: 0.91 MG/DL — SIGNIFICANT CHANGE UP (ref 0.5–1.3)
DIFF PNL FLD: NEGATIVE — SIGNIFICANT CHANGE UP
EGFR: 120 ML/MIN/1.73M2 — SIGNIFICANT CHANGE UP
EOSINOPHIL # BLD AUTO: 0.01 K/UL — SIGNIFICANT CHANGE UP (ref 0–0.5)
EOSINOPHIL NFR BLD AUTO: 0.1 % — SIGNIFICANT CHANGE UP (ref 0–6)
ETHANOL SERPL-MCNC: <3 MG/DL — SIGNIFICANT CHANGE UP
GLUCOSE SERPL-MCNC: 113 MG/DL — HIGH (ref 70–99)
GLUCOSE UR QL: NEGATIVE — SIGNIFICANT CHANGE UP
HCT VFR BLD CALC: 47.5 % — SIGNIFICANT CHANGE UP (ref 39–50)
HGB BLD-MCNC: 16.5 G/DL — SIGNIFICANT CHANGE UP (ref 13–17)
IMM GRANULOCYTES NFR BLD AUTO: 0.2 % — SIGNIFICANT CHANGE UP (ref 0–0.9)
KETONES UR-MCNC: 40 MG/DL
LACTATE SERPL-SCNC: 0.6 MMOL/L — SIGNIFICANT CHANGE UP (ref 0.4–2)
LEUKOCYTE ESTERASE UR-ACNC: NEGATIVE — SIGNIFICANT CHANGE UP
LYMPHOCYTES # BLD AUTO: 1.18 K/UL — SIGNIFICANT CHANGE UP (ref 1–3.3)
LYMPHOCYTES # BLD AUTO: 13 % — SIGNIFICANT CHANGE UP (ref 13–44)
MAGNESIUM SERPL-MCNC: 1.9 MG/DL — SIGNIFICANT CHANGE UP (ref 1.6–2.6)
MCHC RBC-ENTMCNC: 30.7 PG — SIGNIFICANT CHANGE UP (ref 27–34)
MCHC RBC-ENTMCNC: 34.7 GM/DL — SIGNIFICANT CHANGE UP (ref 32–36)
MCV RBC AUTO: 88.5 FL — SIGNIFICANT CHANGE UP (ref 80–100)
MONOCYTES # BLD AUTO: 0.4 K/UL — SIGNIFICANT CHANGE UP (ref 0–0.9)
MONOCYTES NFR BLD AUTO: 4.4 % — SIGNIFICANT CHANGE UP (ref 2–14)
NEUTROPHILS # BLD AUTO: 7.41 K/UL — HIGH (ref 1.8–7.4)
NEUTROPHILS NFR BLD AUTO: 82 % — HIGH (ref 43–77)
NITRITE UR-MCNC: NEGATIVE — SIGNIFICANT CHANGE UP
NRBC # BLD: 0 /100 WBCS — SIGNIFICANT CHANGE UP (ref 0–0)
PCP SPEC-MCNC: SIGNIFICANT CHANGE UP
PH UR: 6 — SIGNIFICANT CHANGE UP (ref 5–8)
PLATELET # BLD AUTO: 262 K/UL — SIGNIFICANT CHANGE UP (ref 150–400)
POTASSIUM SERPL-MCNC: 4 MMOL/L — SIGNIFICANT CHANGE UP (ref 3.5–5.3)
POTASSIUM SERPL-SCNC: 4 MMOL/L — SIGNIFICANT CHANGE UP (ref 3.5–5.3)
PROT SERPL-MCNC: 8.8 G/DL — HIGH (ref 6.4–8.2)
PROT UR-MCNC: ABNORMAL MG/DL
RBC # BLD: 5.37 M/UL — SIGNIFICANT CHANGE UP (ref 4.2–5.8)
RBC # FLD: 11.9 % — SIGNIFICANT CHANGE UP (ref 10.3–14.5)
SARS-COV-2 RNA SPEC QL NAA+PROBE: SIGNIFICANT CHANGE UP
SODIUM SERPL-SCNC: 139 MMOL/L — SIGNIFICANT CHANGE UP (ref 132–145)
SP GR SPEC: 1.01 — SIGNIFICANT CHANGE UP (ref 1–1.03)
UROBILINOGEN FLD QL: 1 E.U./DL — SIGNIFICANT CHANGE UP
WBC # BLD: 9.05 K/UL — SIGNIFICANT CHANGE UP (ref 3.8–10.5)
WBC # FLD AUTO: 9.05 K/UL — SIGNIFICANT CHANGE UP (ref 3.8–10.5)

## 2023-04-04 PROCEDURE — 70450 CT HEAD/BRAIN W/O DYE: CPT | Mod: 26

## 2023-04-04 PROCEDURE — 99285 EMERGENCY DEPT VISIT HI MDM: CPT

## 2023-04-04 PROCEDURE — 71250 CT THORAX DX C-: CPT | Mod: 26

## 2023-04-04 PROCEDURE — 90792 PSYCH DIAG EVAL W/MED SRVCS: CPT | Mod: 1L,95

## 2023-04-04 RX ORDER — TRAZODONE HCL 50 MG
50 TABLET ORAL AT BEDTIME
Refills: 0 | Status: DISCONTINUED | OUTPATIENT
Start: 2023-04-04 | End: 2023-04-06

## 2023-04-04 RX ORDER — DIPHENHYDRAMINE HCL 50 MG
50 CAPSULE ORAL EVERY 6 HOURS
Refills: 0 | Status: DISCONTINUED | OUTPATIENT
Start: 2023-04-04 | End: 2023-04-06

## 2023-04-04 RX ORDER — HALOPERIDOL DECANOATE 100 MG/ML
5 INJECTION INTRAMUSCULAR EVERY 6 HOURS
Refills: 0 | Status: DISCONTINUED | OUTPATIENT
Start: 2023-04-04 | End: 2023-04-06

## 2023-04-04 RX ORDER — SODIUM CHLORIDE 9 MG/ML
2000 INJECTION INTRAMUSCULAR; INTRAVENOUS; SUBCUTANEOUS ONCE
Refills: 0 | Status: COMPLETED | OUTPATIENT
Start: 2023-04-04 | End: 2023-04-04

## 2023-04-04 RX ORDER — ACETAMINOPHEN 500 MG
975 TABLET ORAL ONCE
Refills: 0 | Status: COMPLETED | OUTPATIENT
Start: 2023-04-04 | End: 2023-04-04

## 2023-04-04 RX ORDER — IBUPROFEN 200 MG
400 TABLET ORAL EVERY 6 HOURS
Refills: 0 | Status: DISCONTINUED | OUTPATIENT
Start: 2023-04-04 | End: 2023-04-06

## 2023-04-04 RX ADMIN — SODIUM CHLORIDE 2000 MILLILITER(S): 9 INJECTION INTRAMUSCULAR; INTRAVENOUS; SUBCUTANEOUS at 14:22

## 2023-04-04 RX ADMIN — Medication 975 MILLIGRAM(S): at 13:52

## 2023-04-04 NOTE — ED BEHAVIORAL HEALTH ASSESSMENT NOTE - RISK ASSESSMENT
risk factors: poor insight, acute psychosis, psychosocial stressors  protective: supportive family, no prior hx of self-harm or violence, no substance use history, employed

## 2023-04-04 NOTE — ED BEHAVIORAL HEALTH ASSESSMENT NOTE - NICOTINE
You were seen in clinic for follow-up of your chest pain. We reviewed the work-up and there is low suspicion for cardiac etiology of your symptoms.      We recommended  - Set of blood tests: we are ruling out rheumatologic conditions  - Stop omeprazole  - C None known

## 2023-04-04 NOTE — ED ADULT NURSE NOTE - NSFALLRSKASSESSTYPE_ED_ALL_ED
Christ Mari is a 75 year old male here for  Chief Complaint   Patient presents with   • Cancer     Denies latex allergy or sensitivity.    Medication verified and med list updated.  PCP and Pharmacy verified.    Social History     Tobacco Use   Smoking Status Never Smoker   Smokeless Tobacco Never Used     Advance Directives Filed: Yes    ECO - Fully active, able to carry on all predisease activities without restrictions.    Height: No.  Ht Readings from Last 1 Encounters:   19 5' 11\" (1.803 m)     Weight:Yes, shoes off.  Wt Readings from Last 3 Encounters:   11/10/19 90.1 kg   10/24/19 94.7 kg   10/14/19 94.3 kg       BMI: There is no height or weight on file to calculate BMI.    REVIEW OF SYSTEMS  GENERAL:  Patient denies headache, fevers, chills, night sweats, weight loss, but complains of: excessive fatigue, change in appetite and dizziness  ALLERGIC/IMMUNOLOGIC: Verified allergies: Yes  EYES:  Patient denies significant visual difficulties, double vision, blurred vision  ENT/MOUTH: Patient denies problems with hearing, sore throat, sinus drainage, mouth sores  ENDOCRINE:  Patient denies diabetes, thyroid disease, hormone replacement, hot flashes  HEMATOLOGIC/LYMPHATIC: Patient denies bleeding, tender lymph nodes, swollen lymph nodes, but complains of: easy bruising  BREASTS: Patient denies abnormal masses of breast, nipple discharge, pain  RESPIRATORY:  Patient denies lung pain with breathing, cough, coughing up blood, but complains of: shortness of breath  CARDIOVASCULAR:  Patient denies anginal chest pain, palpitations, shortness of breath when lying flat, peripheral edema  GASTROINTESTINAL: Patient denies abdominal pain , nausea, vomiting, diarrhea, GI bleeding, constipation, change in bowel habits, heartburn, sensation of feeling full, difficulty swallowing  : Patient denies blood in the urine, burning with urination, frequency, urgency, hesitancy, incontinence  MUSCULOSKELETAL:  Patient  denies joint pain, bone pain, joint swelling, redness, decreased range of motion  SKIN:  Patient denies chronic rashes, inflammation, ulcerations, skin changes, itching  NEUROLOGIC:  Patient denies loss of balance, areas of focal weakness, abnormal gait, sensory problems, numbness, tingling  PSYCHIATRIC: Patient denies insomnia, depression, anxiety   Initial (On Arrival)

## 2023-04-04 NOTE — ED BEHAVIORAL HEALTH ASSESSMENT NOTE - HPI (INCLUDE ILLNESS QUALITY, SEVERITY, DURATION, TIMING, CONTEXT, MODIFYING FACTORS, ASSOCIATED SIGNS AND SYMPTOMS)
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    Interview conducted w/ Desi  #[id number]    On exam currently, the patient is very oddly related. He reports feeling tired but otherwise is unable to provide any psychiatric history. Thoughts seem impoverished, he often does not respond at all to questions, and even when he does his answers are often incoherent/illogical. He also has difficulty answering clearly basic demographic questions. At times he says his family thinks he is "ill" or that he knows he is not feeling well but cannot articulate his concerns. Appears internally preoccupied and guarded. He does endorse vague paranoia about others trying to harm him but refuses to elaborate on symptom.    Corroborative obtained from brother Ashu at bedside: Per brother, patient was in his usual normal seeming state up until two days ago. Brother received a call from their brother-in-law who said the patient showed up to his place and was requesting to stay there, saying that some people were after him. Ashu feels patient currently is totally not acting like himself, is not making sense and acting bizarrely. Family including parents have also noticed that patient has not been eating/sleeping in this time frame, but they also don't have any further clues as to what may be going on with the patient. Denies any family history of similar or other psychiatric illness, and says patient has never used any drugs in the past. Brother thinks a possible stressor is patient's relationship with his wife.

## 2023-04-04 NOTE — ED BEHAVIORAL HEALTH NOTE - BEHAVIORAL HEALTH NOTE
===================     PRE-HOSPITAL COURSE     ==================     SOURCE: [facility] ED Documentation      DETAILS: [facility]        ============     ED COURSE     ============     SOURCE: [facility] ED Documentation and ED RN Samreen       ARRIVAL: [facility]      BELONGINGS: No belongings of note       BEHAVIOR: Per ED RN, Pt is calm and cooperative at bedside. Pts thought process is disorganized and Pt appears to be confused. Pt denies any SI/HI/A/V/H. Pt reports that he "fell" but is not able to elaborate. ED RN reports that there are no visible marks, bruises or lacerations on the body. Pt appears to be well groomed but his clothing is dirty. Pt remains in behavior and impulse control and is not violent or aggressive.      TREATMENT: Pt received Tylenol for a headache. No other medication. No restraints required.    VISITORS: Pts parents are both at bedside.     ------------------------------------------------     COVID Exposure Screen- collateral (i.e. third-party, chart review, belongings, etc; include EMS and ED staff)     ---------------------------------------------------     1. Has the patient had a COVID-19 test in the last 90 days? Unknown.     2. Has the patient tested positive for COVID-19 antibodies? Unknown.     3.Has the patient received 2 doses of the COVID-19 vaccine?  Unknown.     4. In the past 10 days, has the patient been around anyone with a positive COVID-19 test?* Unknown.     5.Has the patient been out of [place] within the past 10 days? Unknown.

## 2023-04-04 NOTE — ED BEHAVIORAL HEALTH ASSESSMENT NOTE - SUMMARY
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    The patient presents with psychotic symptoms including disorganized thoughts and paranoid ideation for several days interfering gravely with overall functioning. Patient has been noted to be not eating, has not been able to present to work, and currently shows impaired awareness of situation/reality testing and is unable to properly communicate any concerns or his needs. He requires psychiatric hospitalization for further monitoring, workup, and treatment of acute psychosis.    Plan:  - admit to psych involuntary 2PC to [facility]  - defer antipsychotic management to inpatient team  - for agitation, can start with Haldol 2mg + Ativan 1mg + Benadryl 25mg PO or IM q6h PRN, and additional dose if no response

## 2023-04-04 NOTE — ED PROVIDER NOTE - PHYSICAL EXAMINATION
VITAL SIGNS: I have reviewed nursing notes and confirm.  CONSTITUTIONAL: Anxious appearing. Somewhat paranoid affect  SKIN: Skin exam is warm and dry, no acute rash.  HEAD: Normocephalic; atraumatic.  EYES: PERRL, EOM intact; conjunctiva and sclera clear.  ENT: No nasal discharge; airway clear.  NECK: Supple; non tender.  CARD: S1, S2 normal; no murmurs, gallops, or rubs. Regular rate and rhythm.  RESP: Unlabored. No wheezes, rales or rhonchi.  ABD: soft; non-distended; non-tender  MSK: Normal ROM. No cyanosis or edema. Non-ttp all ext, distal pulses intact  LYMPH: No acute cervical adenopathy.  NEURO: Alert, oriented. Grossly unremarkable.  PSYCH: Anxious appearing. Somewhat paranoid affect

## 2023-04-04 NOTE — ED ADULT NURSE NOTE - LANGUAGE ASSISTANCE NEEDED
Yes-Patient/Caregiver accepts free interpretation services... PAST MEDICAL HISTORY:  Coarctation of aorta     Congenital single kidney     Gastroesophageal reflux     TEF (tracheoesophageal fistula)     Tracheomalacia     VACTERL syndrome

## 2023-04-04 NOTE — ED ADULT NURSE NOTE - OBJECTIVE STATEMENT
Pt aox3 Pt brought in from home with mom and dad. Pt able to answer questions but has running thoughts. Pt states he fell this am after returning from work and is now having increased headaches. When asked where pt hit his head he started to talk about working with dad and brother. No obvious deformities noted. Pt denies medical hx.

## 2023-04-04 NOTE — ED ADULT TRIAGE NOTE - NS ED TRIAGE AVPU SCALE
Alert-The patient is alert, awake and responds to voice. The patient is oriented to time, place, and person. The triage nurse is able to obtain subjective information. Cephalic

## 2023-04-04 NOTE — BH PATIENT PROFILE - HOME MEDICATIONS
Tamiflu 75 mg oral capsule , 1 cap(s) orally 2 times a day indication: flu   famotidine 40 mg oral tablet , 1 tab(s) orally once a day (at bedtime)   ibuprofen 600 mg oral tablet , 1 tab(s) orally every 6 hours, As Needed -for severe pain   tobramycin 0.3% ophthalmic solution , 2 drop(s) in each affected eye every 4 hours

## 2023-04-04 NOTE — BH PATIENT PROFILE - FUNCTIONAL ASSESSMENT - DAILY ACTIVITY 3.
Caitlin Cao 11/30/2021 7:54 PM CST      ----- Message -----  From: Kelsey Joseph  Sent: 11/30/2021 1:52 PM CST  To: [facility] Default Message Pool  Subject: metoprolol     Thanks!     
Last read by Kelsey Joseph at 1:51 PM on 11/30/2021.    
4 = No assist / stand by assistance

## 2023-04-04 NOTE — ED ADULT NURSE NOTE - CHIEF COMPLAINT QUOTE
Pt BIBA from home. As per EMS pt found crying on couch unable/unwilling to elaborate on symptoms. Pt requested EMS Kuwaiti  and then declined to speak to . EMS states pt is known to speak english and later stated he fell and hit his head. In triage Pt is alert, but unwilling unable to answer questions. Was seen in ED 1 day ago with unclear complaints but left without being seen.

## 2023-04-04 NOTE — ED PROVIDER NOTE - CLINICAL SUMMARY MEDICAL DECISION MAKING FREE TEXT BOX
26 y/o M presents to the ED exhibiting evidence of acute psychosis. Tele-psych consulted who agrees Pt is acutely psychotic and warrants admission. Negative imaging and labs. Pt is medically cleared for admission.
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
## 2023-04-04 NOTE — ED ADULT TRIAGE NOTE - CHIEF COMPLAINT QUOTE
Pt BIBA from home. As per EMS pt found crying on couch unable/unwilling to elaborate on symptoms. Pt requested EMS Taiwanese  and then declined to speak to . EMS states pt is known to speak english and later stated he fell and hit his head. In triage Pt is alert, but unwilling unable to answer questions. Was seen in ED 1 day ago with unclear complaints but left without being seen.

## 2023-04-05 ENCOUNTER — FORM ENCOUNTER (OUTPATIENT)
Age: 28
End: 2023-04-05

## 2023-04-05 DIAGNOSIS — F23 BRIEF PSYCHOTIC DISORDER: ICD-10-CM

## 2023-04-05 DIAGNOSIS — Z53.21 PROCEDURE AND TREATMENT NOT CARRIED OUT DUE TO PATIENT LEAVING PRIOR TO BEING SEEN BY HEALTH CARE PROVIDER: ICD-10-CM

## 2023-04-05 PROCEDURE — 99223 1ST HOSP IP/OBS HIGH 75: CPT

## 2023-04-05 RX ORDER — RISPERIDONE 4 MG/1
0.5 TABLET ORAL
Refills: 0 | Status: DISCONTINUED | OUTPATIENT
Start: 2023-04-05 | End: 2023-04-06

## 2023-04-05 NOTE — BH SOCIAL WORK INITIAL PSYCHOSOCIAL EVALUATION - NSBHABUSECOMMENTFT_PSY_ALL_CORE
Pt appears very lethargic on approach. Minimally cooperative with interview, seems confused with questions and unable to provide much information.  reported to have difficulty hearing patient at times due to his soft speech.

## 2023-04-05 NOTE — BH TREATMENT PLAN - NSTXPSYCHOINTERRN_PSY_ALL_CORE
Monitor, observe Pt for increased psychotic symptoms, offer reality orientation, and encourage compliance with treatment, and medication regiment.

## 2023-04-05 NOTE — BH SOCIAL WORK INITIAL PSYCHOSOCIAL EVALUATION - NSBHLEGALCURRENT_PSY_ALL_CORE
Pt appears very lethargic on approach. Minimally cooperative with interview, seems confused with questions and unable to provide much information.  reported to have difficulty hearing patient at times due to his soft speech./Unable to answer (specify)

## 2023-04-05 NOTE — ED ADULT NURSE NOTE - GENITOURINARY ASSESSMENT
Mom said that She had him Friday through Tuesday for their [place] trip to his specialist.  He had normal bm the whole time.  This morning went back to  and they sent him home with because he had a loose stool.  Mom said she is pretty sure it is anxiety related.  She did the probiotic like you suggested.  She is working on the fiber intake.  She wants to know if you will write a letter stating the  can't give him juice or milk to see if that will make a difference.   - - -

## 2023-04-05 NOTE — BH INPATIENT PSYCHIATRY ASSESSMENT NOTE - NSSUICPROTFACT_PSY_ALL_CORE
Responsibility to children, family, or others/Identifies reasons for living/Cultural, spiritual and/or moral attitudes against suicide
standing/toileting/walking

## 2023-04-05 NOTE — BH INPATIENT PSYCHIATRY ASSESSMENT NOTE - NSBHCHARTREVIEWINVESTIGATE_PSY_A_CORE FT
ACC: [id number] EXAM:  CT BRAIN   ORDERED BY: ELISA MENDOZA     PROCEDURE DATE:  04/04/2023          INTERPRETATION:  PROCEDURE: CT head without intravenous contrast    INDICATION: Trauma; head injury; rule out ICH    TECHNIQUE: Multiple axial images were obtained at 5 mm intervals from the   skull base to the vertex. Sagittal and coronal reformatted images were   obtained from the axial data set. The images were reviewed in brain and   bone windows.    COMPARISON: CT head 11/30/2021    FINDINGS: The CT examination demonstrates the ventricles, cisternal   spaces, and cortical sulci to be within normal limits. There is no   midline shift or extra axial collections. The gray white differentiation   appears within normal limits. There is no intracranial hemorrhage or   acute transcortical infarct. The bony windows demonstrates no fractures.   The visualized paranasal sinuses are within normal limits. The mastoid   air cells are well aerated.    There is prominence of the soft tissues of the nasopharynx without   underlying osseous erosion or deep tissue plane infiltration which is   presumably adenoidal tissue.    IMPRESSION: No intracranial hemorrhage or calvarial fracture.    --- End of Report ---

## 2023-04-05 NOTE — BH SOCIAL WORK INITIAL PSYCHOSOCIAL EVALUATION - OTHER PAST PSYCHIATRIC HISTORY (INCLUDE DETAILS REGARDING ONSET, COURSE OF ILLNESS, INPATIENT/OUTPATIENT TREATMENT)
The patient is a 28 yo male living with his parents. No formal psychiatric hx, no known previous hospitalizations, no suicide attempts, not currently in treatment or taking medications. No drug/etoh abuse hx. No known legal hx. Patient brought in my family-brother to [facility] ED for bizarre behavior for the past several days. Noted to be paranoid in ED, utox negative for all substances. Head CT unremarkable. Pt admitted to [facility] for psychosis.

## 2023-04-05 NOTE — BH INPATIENT PSYCHIATRY ASSESSMENT NOTE - NSBHASSESSSUMMFT_PSY_ALL_CORE
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  This is a 28y/o [place] male,  (wife in [facility]), domiciled with parents, working part time in construction and as a superintendant. Unknown medical hx. No formal psychiatric hx, no known previous hospitalizations, no suicide attempts, not currently in treatment or taking medications. No drug/etoh abuse hx. No known legal hx. Patient brought in my family-brother to [facility] ED for bizarre behavior for the past several days. Noted to be paranoid in ED, utox negative for all substances. Head CT unremarkable. Pt admitted to [address] [place] [address] for psychosis.    Limited interview with Argentine , pt endorses SI prior to admission and current having ah 'hearing noises in my head.' Appears psychotic and internally preoccupied. Collateral attained in ED notable for change in behavioral several days prior to presentation to ED, not eating, not working, decrease in ability to communicate and interact with others. Plan to initiate antipsychotic.     Risperdal 0.5mg bid initiated

## 2023-04-05 NOTE — BH SOCIAL WORK INITIAL PSYCHOSOCIAL EVALUATION - NSBHCOGDIS_PSY_ALL_CORE
Pt appears very lethargic on approach. Minimally cooperative with interview, seems confused with questions and unable to provide much information.  reported to have difficulty hearing patient at times due to his soft speech,/Unable to answer (specify)

## 2023-04-05 NOTE — BH INPATIENT PSYCHIATRY ASSESSMENT NOTE - CURRENT MEDICATION
MEDICATIONS  (STANDING):  risperiDONE   Tablet 0.5 milliGRAM(s) Oral two times a day    MEDICATIONS  (PRN):  diphenhydrAMINE 50 milliGRAM(s) Oral every 6 hours PRN Rash and/or Itching, EPS  haloperidol     Tablet 5 milliGRAM(s) Oral every 6 hours PRN agitation  ibuprofen  Tablet. 400 milliGRAM(s) Oral every 6 hours PRN Moderate Pain (4 - 6)  LORazepam     Tablet 2 milliGRAM(s) Oral every 6 hours PRN agitation  traZODone 50 milliGRAM(s) Oral at bedtime PRN insomnia

## 2023-04-05 NOTE — BH INPATIENT PSYCHIATRY ASSESSMENT NOTE - NSBHMETABOLIC_PSY_ALL_CORE_FT
BMI: BMI (kg/m2): 24.4 (04-04-23 @ 22:15)  HbA1c:   Glucose: POCT Blood Glucose.: 107 mg/dL (04-04-23 @ 10:52)    BP: 129/74 (04-05-23 @ 06:03) (129/74 - 158/102)  Lipid Panel:

## 2023-04-05 NOTE — BH INPATIENT PSYCHIATRY ASSESSMENT NOTE - NSBHCHARTREVIEWVS_PSY_A_CORE FT
Vital Signs Last 24 Hrs  T(C): 36.9 (04-05-23 @ 06:03), Max: 37.2 (04-04-23 @ 22:15)  T(F): 98.5 (04-05-23 @ 06:03), Max: 98.9 (04-04-23 @ 22:15)  HR: 80 (04-05-23 @ 06:03) (76 - 88)  BP: 129/74 (04-05-23 @ 06:03) (129/74 - 158/102)  BP(mean): --  RR: 18 (04-05-23 @ 06:03) (16 - 18)  SpO2: 99% (04-05-23 @ 06:03) (98% - 100%)

## 2023-04-05 NOTE — BH INPATIENT PSYCHIATRY ASSESSMENT NOTE - VIOLENCE RISK FACTORS:
Jose Raul Landers MD;  Location: [facility];  Mark Kelly MD 01/11 1749                  MDM  Number of Diagnoses or Management Options  Urinary retention due to benign prostatic hyperplasia: established and worsening     Amount and/or Complexity of Data Reviewed  Discuss the patient with other providers: yes    Risk of Complications, Morbidity, and/or Mortality  Presenting problems: moderate  Diagnostic procedures: minimal  Management options: moderate    Patient Progress  Patient progress: stable      Final diagnoses:   Urinary retention due to benign prostatic hyperplasia           Labs Reviewed - No data to display  No orders to display          Medication List      Stop          oxyCODONE-acetaminophen 7.5-325 MG per tablet   Commonly known as:  PERCOCET                Mark Kelly MD  01/11/18 1746     Other

## 2023-04-05 NOTE — BH INPATIENT PSYCHIATRY ASSESSMENT NOTE - HPI (INCLUDE ILLNESS QUALITY, SEVERITY, DURATION, TIMING, CONTEXT, MODIFYING FACTORS, ASSOCIATED SIGNS AND SYMPTOMS)
The patient is a 28 yo male living with his parents, working part-time as a  and also as a superintendant,  (wife in [facility]), with no PMH, and no prior psych hx, no known history of substance use, who was brought in by family due to bizarre behavior for the past several days.  This is a 26y/o [place] male,  (wife in [facility]), domiciled with parents, working part time in construction and as a superintendant. Unknown medical hx. No formal psychiatric hx, no known previous hospitalizations, no suicide attempts, not currently in treatment or taking medications. No drug/etoh abuse hx. No known legal hx. Patient brought in my family-brother to [facility] ED for bizarre behavior for the past several days. Noted to be paranoid in ED, utox negative for all substances. Head CT unremarkable. Pt admitted to [address] [place] [address] for psychosis.    Interview conducted w/ [place]  #[id number]. Patient seen in his room with SW, appears very lethargic on approach, however has not received any medications since admittance to unit, minimally cooperative with interview, seems confused with questions and unable to provide much information.  reported to have difficulty hearing patient at times due to his soft speech, pt would repeat his responses. Patient asked team why was he in the hospital. He did share that prior to admission, having had suicidal ideations, but unable to provide details including intent, plan etc. When asked about hallucinations, he reported hearing noises in his head, but could not provide any additional info. He reported drinking etoh, denied drug use. Reported being in pain but could not give location or severity.     Per ED report:  On exam currently, the patient is very oddly related. He reports feeling tired but otherwise is unable to provide any psychiatric history. Thoughts seem impoverished, he often does not respond at all to questions, and even when he does his answers are often incoherent/illogical. He also has difficulty answering clearly basic demographic questions. At times he says his family thinks he is "ill" or that he knows he is not feeling well but cannot articulate his concerns. Appears internally preoccupied and guarded. He does endorse vague paranoia about others trying to harm him but refuses to elaborate on symptom.    Corroborative obtained from brother Ashu at bedside: Per brother, patient was in his usual normal seeming state up until two days ago. Brother received a call from their brother-in-law who said the patient showed up to his place and was requesting to stay there, saying that some people were after him. Ashu feels patient currently is totally not acting like himself, is not making sense and acting bizarrely. Family including parents have also noticed that patient has not been eating/sleeping in this time frame, but they also don't have any further clues as to what may be going on with the patient. Denies any family history of similar or other psychiatric illness, and says patient has never used any drugs in the past. Brother thinks a possible stressor is patient's relationship with his wife.

## 2023-04-05 NOTE — BH INPATIENT PSYCHIATRY ASSESSMENT NOTE - NSBHATTESTAPPBILLTIME_PSY_A_CORE
I attest my time as COLT is greater than 50% of the total combined time spent on qualifying patient care activities. I have reviewed and verified the documentation.

## 2023-04-06 ENCOUNTER — INPATIENT (INPATIENT)
Facility: HOSPITAL | Age: 28
LOS: 4 days | Discharge: PSYCHIATRIC OR SUBSTANCE USE FACILITY | DRG: 884 | End: 2023-04-11
Attending: INTERNAL MEDICINE | Admitting: INTERNAL MEDICINE
Payer: COMMERCIAL

## 2023-04-06 VITALS
TEMPERATURE: 98 F | SYSTOLIC BLOOD PRESSURE: 142 MMHG | DIASTOLIC BLOOD PRESSURE: 82 MMHG | OXYGEN SATURATION: 98 % | RESPIRATION RATE: 14 BRPM | HEART RATE: 84 BPM

## 2023-04-06 VITALS
HEIGHT: 69 IN | TEMPERATURE: 98 F | DIASTOLIC BLOOD PRESSURE: 101 MMHG | SYSTOLIC BLOOD PRESSURE: 147 MMHG | RESPIRATION RATE: 16 BRPM | HEART RATE: 110 BPM | OXYGEN SATURATION: 100 %

## 2023-04-06 DIAGNOSIS — R65.10 SYSTEMIC INFLAMMATORY RESPONSE SYNDROME (SIRS) OF NON-INFECTIOUS ORIGIN WITHOUT ACUTE ORGAN DYSFUNCTION: ICD-10-CM

## 2023-04-06 DIAGNOSIS — G93.40 ENCEPHALOPATHY, UNSPECIFIED: ICD-10-CM

## 2023-04-06 DIAGNOSIS — Z29.9 ENCOUNTER FOR PROPHYLACTIC MEASURES, UNSPECIFIED: ICD-10-CM

## 2023-04-06 DIAGNOSIS — R33.9 RETENTION OF URINE, UNSPECIFIED: ICD-10-CM

## 2023-04-06 LAB
ALBUMIN SERPL ELPH-MCNC: 4.6 G/DL — SIGNIFICANT CHANGE UP (ref 3.3–5)
ALBUMIN SERPL ELPH-MCNC: 4.8 G/DL — SIGNIFICANT CHANGE UP (ref 3.3–5)
ALP SERPL-CCNC: 58 U/L — SIGNIFICANT CHANGE UP (ref 40–120)
ALP SERPL-CCNC: 65 U/L — SIGNIFICANT CHANGE UP (ref 40–120)
ALT FLD-CCNC: 16 U/L — SIGNIFICANT CHANGE UP (ref 10–45)
ALT FLD-CCNC: 19 U/L — SIGNIFICANT CHANGE UP (ref 10–45)
AMMONIA BLD-MCNC: 14 UMOL/L — SIGNIFICANT CHANGE UP (ref 11–55)
AMPHET UR-MCNC: NEGATIVE — SIGNIFICANT CHANGE UP
ANION GAP SERPL CALC-SCNC: 13 MMOL/L — SIGNIFICANT CHANGE UP (ref 5–17)
ANION GAP SERPL CALC-SCNC: 13 MMOL/L — SIGNIFICANT CHANGE UP (ref 5–17)
APAP SERPL-MCNC: <5 UG/ML — LOW (ref 10–30)
APPEARANCE CSF: CLEAR — SIGNIFICANT CHANGE UP
APPEARANCE SPUN FLD: COLORLESS — SIGNIFICANT CHANGE UP
APPEARANCE UR: CLEAR — SIGNIFICANT CHANGE UP
APPEARANCE UR: CLEAR — SIGNIFICANT CHANGE UP
APTT BLD: 28.6 SEC — SIGNIFICANT CHANGE UP (ref 27.5–35.5)
AST SERPL-CCNC: 12 U/L — SIGNIFICANT CHANGE UP (ref 10–40)
AST SERPL-CCNC: 15 U/L — SIGNIFICANT CHANGE UP (ref 10–40)
BACTERIA # UR AUTO: PRESENT /HPF
BACTERIA # UR AUTO: PRESENT /HPF
BARBITURATES UR SCN-MCNC: NEGATIVE — SIGNIFICANT CHANGE UP
BASE EXCESS BLDA CALC-SCNC: -2.4 MMOL/L — LOW (ref -2–3)
BASOPHILS # BLD AUTO: 0.04 K/UL — SIGNIFICANT CHANGE UP (ref 0–0.2)
BASOPHILS # BLD AUTO: 0.07 K/UL — SIGNIFICANT CHANGE UP (ref 0–0.2)
BASOPHILS NFR BLD AUTO: 0.5 % — SIGNIFICANT CHANGE UP (ref 0–2)
BASOPHILS NFR BLD AUTO: 0.5 % — SIGNIFICANT CHANGE UP (ref 0–2)
BENZODIAZ UR-MCNC: NEGATIVE — SIGNIFICANT CHANGE UP
BILIRUB SERPL-MCNC: 2.2 MG/DL — HIGH (ref 0.2–1.2)
BILIRUB SERPL-MCNC: 2.3 MG/DL — HIGH (ref 0.2–1.2)
BILIRUB UR-MCNC: NEGATIVE — SIGNIFICANT CHANGE UP
BILIRUB UR-MCNC: NEGATIVE — SIGNIFICANT CHANGE UP
BUN SERPL-MCNC: 17 MG/DL — SIGNIFICANT CHANGE UP (ref 7–23)
BUN SERPL-MCNC: 18 MG/DL — SIGNIFICANT CHANGE UP (ref 7–23)
CALCIUM SERPL-MCNC: 10.4 MG/DL — SIGNIFICANT CHANGE UP (ref 8.4–10.5)
CALCIUM SERPL-MCNC: 9.9 MG/DL — SIGNIFICANT CHANGE UP (ref 8.4–10.5)
CHLORIDE SERPL-SCNC: 104 MMOL/L — SIGNIFICANT CHANGE UP (ref 96–108)
CHLORIDE SERPL-SCNC: 106 MMOL/L — SIGNIFICANT CHANGE UP (ref 96–108)
CK SERPL-CCNC: 36 U/L — SIGNIFICANT CHANGE UP (ref 30–200)
CO2 BLDA-SCNC: 23 MMOL/L — SIGNIFICANT CHANGE UP (ref 19–24)
CO2 SERPL-SCNC: 24 MMOL/L — SIGNIFICANT CHANGE UP (ref 22–31)
CO2 SERPL-SCNC: 24 MMOL/L — SIGNIFICANT CHANGE UP (ref 22–31)
COCAINE METAB.OTHER UR-MCNC: NEGATIVE — SIGNIFICANT CHANGE UP
COLOR CSF: SIGNIFICANT CHANGE UP
COLOR SPEC: YELLOW — SIGNIFICANT CHANGE UP
COLOR SPEC: YELLOW — SIGNIFICANT CHANGE UP
COMMENT - URINE: SIGNIFICANT CHANGE UP
COMMENT - URINE: SIGNIFICANT CHANGE UP
CREAT ?TM UR-MCNC: 148 MG/DL — SIGNIFICANT CHANGE UP
CREAT SERPL-MCNC: 0.8 MG/DL — SIGNIFICANT CHANGE UP (ref 0.5–1.3)
CREAT SERPL-MCNC: 0.82 MG/DL — SIGNIFICANT CHANGE UP (ref 0.5–1.3)
CRP SERPL-MCNC: 9.9 MG/L — HIGH (ref 0–4)
CSF COMMENTS: SIGNIFICANT CHANGE UP
CSF PCR RESULT: SIGNIFICANT CHANGE UP
DIFF PNL FLD: ABNORMAL
DIFF PNL FLD: ABNORMAL
EGFR: 125 ML/MIN/1.73M2 — SIGNIFICANT CHANGE UP
EGFR: 126 ML/MIN/1.73M2 — SIGNIFICANT CHANGE UP
EOSINOPHIL # BLD AUTO: 0.01 K/UL — SIGNIFICANT CHANGE UP (ref 0–0.5)
EOSINOPHIL # BLD AUTO: 0.03 K/UL — SIGNIFICANT CHANGE UP (ref 0–0.5)
EOSINOPHIL NFR BLD AUTO: 0.1 % — SIGNIFICANT CHANGE UP (ref 0–6)
EOSINOPHIL NFR BLD AUTO: 0.4 % — SIGNIFICANT CHANGE UP (ref 0–6)
EPI CELLS # UR: SIGNIFICANT CHANGE UP /HPF (ref 0–5)
EPI CELLS # UR: SIGNIFICANT CHANGE UP /HPF (ref 0–5)
ERYTHROCYTE [SEDIMENTATION RATE] IN BLOOD: 12 MM/HR — SIGNIFICANT CHANGE UP
ETHANOL SERPL-MCNC: <10 MG/DL — SIGNIFICANT CHANGE UP (ref 0–10)
GLUCOSE BLDC GLUCOMTR-MCNC: 101 MG/DL — HIGH (ref 70–99)
GLUCOSE BLDC GLUCOMTR-MCNC: 105 MG/DL — HIGH (ref 70–99)
GLUCOSE CSF-MCNC: 98 MG/DL — HIGH (ref 40–70)
GLUCOSE SERPL-MCNC: 105 MG/DL — HIGH (ref 70–99)
GLUCOSE SERPL-MCNC: 123 MG/DL — HIGH (ref 70–99)
GLUCOSE UR QL: NEGATIVE — SIGNIFICANT CHANGE UP
GLUCOSE UR QL: NEGATIVE — SIGNIFICANT CHANGE UP
GRAM STN FLD: SIGNIFICANT CHANGE UP
HCO3 BLDA-SCNC: 22 MMOL/L — SIGNIFICANT CHANGE UP (ref 21–28)
HCT VFR BLD CALC: 41.9 % — SIGNIFICANT CHANGE UP (ref 39–50)
HCT VFR BLD CALC: 47.4 % — SIGNIFICANT CHANGE UP (ref 39–50)
HGB BLD-MCNC: 14.8 G/DL — SIGNIFICANT CHANGE UP (ref 13–17)
HGB BLD-MCNC: 16.8 G/DL — SIGNIFICANT CHANGE UP (ref 13–17)
HIV 1+2 AB+HIV1 P24 AG SERPL QL IA: SIGNIFICANT CHANGE UP
IMM GRANULOCYTES NFR BLD AUTO: 0.4 % — SIGNIFICANT CHANGE UP (ref 0–0.9)
IMM GRANULOCYTES NFR BLD AUTO: 0.6 % — SIGNIFICANT CHANGE UP (ref 0–0.9)
INR BLD: 1.3 — HIGH (ref 0.88–1.16)
KETONES UR-MCNC: >=80 MG/DL
KETONES UR-MCNC: >=80 MG/DL
LACTATE SERPL-SCNC: 0.8 MMOL/L — SIGNIFICANT CHANGE UP (ref 0.5–2)
LACTATE SERPL-SCNC: 1 MMOL/L — SIGNIFICANT CHANGE UP (ref 0.5–2)
LEUKOCYTE ESTERASE UR-ACNC: NEGATIVE — SIGNIFICANT CHANGE UP
LEUKOCYTE ESTERASE UR-ACNC: NEGATIVE — SIGNIFICANT CHANGE UP
LYMPHOCYTES # BLD AUTO: 1.25 K/UL — SIGNIFICANT CHANGE UP (ref 1–3.3)
LYMPHOCYTES # BLD AUTO: 1.47 K/UL — SIGNIFICANT CHANGE UP (ref 1–3.3)
LYMPHOCYTES # BLD AUTO: 18.2 % — SIGNIFICANT CHANGE UP (ref 13–44)
LYMPHOCYTES # BLD AUTO: 8.6 % — LOW (ref 13–44)
LYMPHOCYTES # CSF: 1 % — LOW (ref 40–80)
MAGNESIUM SERPL-MCNC: 1.9 MG/DL — SIGNIFICANT CHANGE UP (ref 1.6–2.6)
MAGNESIUM SERPL-MCNC: 2 MG/DL — SIGNIFICANT CHANGE UP (ref 1.6–2.6)
MCHC RBC-ENTMCNC: 31 PG — SIGNIFICANT CHANGE UP (ref 27–34)
MCHC RBC-ENTMCNC: 31.4 PG — SIGNIFICANT CHANGE UP (ref 27–34)
MCHC RBC-ENTMCNC: 35.3 GM/DL — SIGNIFICANT CHANGE UP (ref 32–36)
MCHC RBC-ENTMCNC: 35.4 GM/DL — SIGNIFICANT CHANGE UP (ref 32–36)
MCV RBC AUTO: 87.5 FL — SIGNIFICANT CHANGE UP (ref 80–100)
MCV RBC AUTO: 88.8 FL — SIGNIFICANT CHANGE UP (ref 80–100)
METHADONE UR-MCNC: NEGATIVE — SIGNIFICANT CHANGE UP
MONOCYTES # BLD AUTO: 0.82 K/UL — SIGNIFICANT CHANGE UP (ref 0–0.9)
MONOCYTES # BLD AUTO: 0.89 K/UL — SIGNIFICANT CHANGE UP (ref 0–0.9)
MONOCYTES NFR BLD AUTO: 10.1 % — SIGNIFICANT CHANGE UP (ref 2–14)
MONOCYTES NFR BLD AUTO: 6.1 % — SIGNIFICANT CHANGE UP (ref 2–14)
NEUTROPHILS # BLD AUTO: 12.2 K/UL — HIGH (ref 1.8–7.4)
NEUTROPHILS # BLD AUTO: 5.67 K/UL — SIGNIFICANT CHANGE UP (ref 1.8–7.4)
NEUTROPHILS # CSF: 0 % — SIGNIFICANT CHANGE UP (ref 0–6)
NEUTROPHILS NFR BLD AUTO: 70.2 % — SIGNIFICANT CHANGE UP (ref 43–77)
NEUTROPHILS NFR BLD AUTO: 84.3 % — HIGH (ref 43–77)
NITRITE UR-MCNC: NEGATIVE — SIGNIFICANT CHANGE UP
NITRITE UR-MCNC: NEGATIVE — SIGNIFICANT CHANGE UP
NRBC # BLD: 0 /100 WBCS — SIGNIFICANT CHANGE UP (ref 0–0)
NRBC # BLD: 0 /100 WBCS — SIGNIFICANT CHANGE UP (ref 0–0)
NRBC NFR CSF: 1 /UL — SIGNIFICANT CHANGE UP (ref 0–5)
OPIATES UR-MCNC: NEGATIVE — SIGNIFICANT CHANGE UP
OSMOLALITY SERPL: 299 MOSM/KG — SIGNIFICANT CHANGE UP (ref 275–300)
OSMOLALITY UR: 714 MOSM/KG — SIGNIFICANT CHANGE UP (ref 300–900)
PCO2 BLDA: 34 MMHG — LOW (ref 35–48)
PCP SPEC-MCNC: SIGNIFICANT CHANGE UP
PCP UR-MCNC: NEGATIVE — SIGNIFICANT CHANGE UP
PH BLDA: 7.41 — SIGNIFICANT CHANGE UP (ref 7.35–7.45)
PH UR: 6 — SIGNIFICANT CHANGE UP (ref 5–8)
PH UR: 6 — SIGNIFICANT CHANGE UP (ref 5–8)
PHOSPHATE SERPL-MCNC: 3.7 MG/DL — SIGNIFICANT CHANGE UP (ref 2.5–4.5)
PLATELET # BLD AUTO: 240 K/UL — SIGNIFICANT CHANGE UP (ref 150–400)
PLATELET # BLD AUTO: 307 K/UL — SIGNIFICANT CHANGE UP (ref 150–400)
PO2 BLDA: 142 MMHG — HIGH (ref 83–108)
POTASSIUM SERPL-MCNC: 3.9 MMOL/L — SIGNIFICANT CHANGE UP (ref 3.5–5.3)
POTASSIUM SERPL-MCNC: 4 MMOL/L — SIGNIFICANT CHANGE UP (ref 3.5–5.3)
POTASSIUM SERPL-SCNC: 3.9 MMOL/L — SIGNIFICANT CHANGE UP (ref 3.5–5.3)
POTASSIUM SERPL-SCNC: 4 MMOL/L — SIGNIFICANT CHANGE UP (ref 3.5–5.3)
POTASSIUM UR-SCNC: 40 MMOL/L — SIGNIFICANT CHANGE UP
PROT ?TM UR-MCNC: 12 MG/DL — SIGNIFICANT CHANGE UP (ref 0–12)
PROT CSF-MCNC: 33 MG/DL — SIGNIFICANT CHANGE UP (ref 15–45)
PROT SERPL-MCNC: 7.6 G/DL — SIGNIFICANT CHANGE UP (ref 6–8.3)
PROT SERPL-MCNC: 8.5 G/DL — HIGH (ref 6–8.3)
PROT UR-MCNC: ABNORMAL MG/DL
PROT UR-MCNC: ABNORMAL MG/DL
PROT/CREAT UR-RTO: 0.1 RATIO — SIGNIFICANT CHANGE UP (ref 0–0.2)
PROTHROM AB SERPL-ACNC: 15.5 SEC — HIGH (ref 10.5–13.4)
RBC # BLD: 4.72 M/UL — SIGNIFICANT CHANGE UP (ref 4.2–5.8)
RBC # BLD: 5.42 M/UL — SIGNIFICANT CHANGE UP (ref 4.2–5.8)
RBC # CSF: 4 /UL — HIGH (ref 0–0)
RBC # FLD: 11.8 % — SIGNIFICANT CHANGE UP (ref 10.3–14.5)
RBC # FLD: 12 % — SIGNIFICANT CHANGE UP (ref 10.3–14.5)
RBC CASTS # UR COMP ASSIST: < 5 /HPF — SIGNIFICANT CHANGE UP
RBC CASTS # UR COMP ASSIST: ABNORMAL /HPF
SALICYLATES SERPL-MCNC: <0.3 MG/DL — LOW (ref 2.8–20)
SAO2 % BLDA: 98.8 % — HIGH (ref 94–98)
SODIUM SERPL-SCNC: 141 MMOL/L — SIGNIFICANT CHANGE UP (ref 135–145)
SODIUM SERPL-SCNC: 143 MMOL/L — SIGNIFICANT CHANGE UP (ref 135–145)
SODIUM UR-SCNC: 119 MMOL/L — SIGNIFICANT CHANGE UP
SP GR SPEC: 1.02 — SIGNIFICANT CHANGE UP (ref 1–1.03)
SP GR SPEC: 1.02 — SIGNIFICANT CHANGE UP (ref 1–1.03)
SPECIMEN SOURCE: SIGNIFICANT CHANGE UP
THC UR QL: NEGATIVE — SIGNIFICANT CHANGE UP
TSH SERPL-MCNC: 1.14 UIU/ML — SIGNIFICANT CHANGE UP (ref 0.27–4.2)
TUBE TYPE: SIGNIFICANT CHANGE UP
UROBILINOGEN FLD QL: 1 E.U./DL — SIGNIFICANT CHANGE UP
UROBILINOGEN FLD QL: 1 E.U./DL — SIGNIFICANT CHANGE UP
UUN UR-MCNC: 779 MG/DL — SIGNIFICANT CHANGE UP
WBC # BLD: 14.48 K/UL — HIGH (ref 3.8–10.5)
WBC # BLD: 8.08 K/UL — SIGNIFICANT CHANGE UP (ref 3.8–10.5)
WBC # FLD AUTO: 14.48 K/UL — HIGH (ref 3.8–10.5)
WBC # FLD AUTO: 8.08 K/UL — SIGNIFICANT CHANGE UP (ref 3.8–10.5)
WBC UR QL: < 5 /HPF — SIGNIFICANT CHANGE UP
WBC UR QL: < 5 /HPF — SIGNIFICANT CHANGE UP

## 2023-04-06 PROCEDURE — 70553 MRI BRAIN STEM W/O & W/DYE: CPT | Mod: 26

## 2023-04-06 PROCEDURE — 71250 CT THORAX DX C-: CPT

## 2023-04-06 PROCEDURE — 71045 X-RAY EXAM CHEST 1 VIEW: CPT | Mod: 26

## 2023-04-06 PROCEDURE — 70450 CT HEAD/BRAIN W/O DYE: CPT

## 2023-04-06 PROCEDURE — 99285 EMERGENCY DEPT VISIT HI MDM: CPT

## 2023-04-06 PROCEDURE — 87635 SARS-COV-2 COVID-19 AMP PRB: CPT

## 2023-04-06 PROCEDURE — 83735 ASSAY OF MAGNESIUM: CPT

## 2023-04-06 PROCEDURE — 81001 URINALYSIS AUTO W/SCOPE: CPT

## 2023-04-06 PROCEDURE — 80053 COMPREHEN METABOLIC PANEL: CPT

## 2023-04-06 PROCEDURE — 82962 GLUCOSE BLOOD TEST: CPT

## 2023-04-06 PROCEDURE — 85025 COMPLETE CBC W/AUTO DIFF WBC: CPT

## 2023-04-06 PROCEDURE — 70450 CT HEAD/BRAIN W/O DYE: CPT | Mod: 26,MA

## 2023-04-06 PROCEDURE — 62270 DX LMBR SPI PNXR: CPT

## 2023-04-06 PROCEDURE — 80307 DRUG TEST PRSMV CHEM ANLYZR: CPT

## 2023-04-06 PROCEDURE — 74177 CT ABD & PELVIS W/CONTRAST: CPT | Mod: 26,MA

## 2023-04-06 PROCEDURE — 99223 1ST HOSP IP/OBS HIGH 75: CPT | Mod: GC

## 2023-04-06 PROCEDURE — 36415 COLL VENOUS BLD VENIPUNCTURE: CPT

## 2023-04-06 PROCEDURE — 99233 SBSQ HOSP IP/OBS HIGH 50: CPT | Mod: GC

## 2023-04-06 PROCEDURE — 99223 1ST HOSP IP/OBS HIGH 75: CPT

## 2023-04-06 PROCEDURE — 83605 ASSAY OF LACTIC ACID: CPT

## 2023-04-06 RX ORDER — VANCOMYCIN HCL 1 G
1500 VIAL (EA) INTRAVENOUS EVERY 12 HOURS
Refills: 0 | Status: DISCONTINUED | OUTPATIENT
Start: 2023-04-06 | End: 2023-04-07

## 2023-04-06 RX ORDER — THIAMINE MONONITRATE (VIT B1) 100 MG
500 TABLET ORAL EVERY 8 HOURS
Refills: 0 | Status: COMPLETED | OUTPATIENT
Start: 2023-04-06 | End: 2023-04-09

## 2023-04-06 RX ORDER — FOLIC ACID 0.8 MG
1 TABLET ORAL EVERY 24 HOURS
Refills: 0 | Status: DISCONTINUED | OUTPATIENT
Start: 2023-04-06 | End: 2023-04-11

## 2023-04-06 RX ORDER — DIATRIZOATE MEGLUMINE 180 MG/ML
30 INJECTION, SOLUTION INTRAVESICAL ONCE
Refills: 0 | Status: COMPLETED | OUTPATIENT
Start: 2023-04-06 | End: 2023-04-06

## 2023-04-06 RX ORDER — ACYCLOVIR SODIUM 500 MG
800 VIAL (EA) INTRAVENOUS EVERY 8 HOURS
Refills: 0 | Status: DISCONTINUED | OUTPATIENT
Start: 2023-04-06 | End: 2023-04-07

## 2023-04-06 RX ORDER — ACYCLOVIR SODIUM 500 MG
800 VIAL (EA) INTRAVENOUS ONCE
Refills: 0 | Status: DISCONTINUED | OUTPATIENT
Start: 2023-04-06 | End: 2023-04-07

## 2023-04-06 RX ORDER — SODIUM CHLORIDE 9 MG/ML
1000 INJECTION INTRAMUSCULAR; INTRAVENOUS; SUBCUTANEOUS ONCE
Refills: 0 | Status: COMPLETED | OUTPATIENT
Start: 2023-04-06 | End: 2023-04-06

## 2023-04-06 RX ORDER — ENOXAPARIN SODIUM 100 MG/ML
40 INJECTION SUBCUTANEOUS EVERY 24 HOURS
Refills: 0 | Status: DISCONTINUED | OUTPATIENT
Start: 2023-04-06 | End: 2023-04-11

## 2023-04-06 RX ORDER — ACETYLCYSTEINE 200 MG/ML
15 VIAL (ML) MISCELLANEOUS ONCE
Refills: 0 | Status: COMPLETED | OUTPATIENT
Start: 2023-04-06 | End: 2023-04-06

## 2023-04-06 RX ORDER — ACYCLOVIR SODIUM 500 MG
VIAL (EA) INTRAVENOUS
Refills: 0 | Status: DISCONTINUED | OUTPATIENT
Start: 2023-04-06 | End: 2023-04-07

## 2023-04-06 RX ORDER — CEFTRIAXONE 500 MG/1
2000 INJECTION, POWDER, FOR SOLUTION INTRAMUSCULAR; INTRAVENOUS EVERY 12 HOURS
Refills: 0 | Status: DISCONTINUED | OUTPATIENT
Start: 2023-04-06 | End: 2023-04-07

## 2023-04-06 RX ADMIN — SODIUM CHLORIDE 1000 MILLILITER(S): 9 INJECTION INTRAMUSCULAR; INTRAVENOUS; SUBCUTANEOUS at 11:36

## 2023-04-06 RX ADMIN — Medication 325 GRAM(S): at 12:05

## 2023-04-06 RX ADMIN — Medication 266 MILLIGRAM(S): at 21:15

## 2023-04-06 RX ADMIN — Medication 105 MILLIGRAM(S): at 19:26

## 2023-04-06 RX ADMIN — Medication 105 MILLIGRAM(S): at 11:42

## 2023-04-06 RX ADMIN — Medication 1 MILLIGRAM(S): at 12:04

## 2023-04-06 RX ADMIN — CEFTRIAXONE 100 MILLIGRAM(S): 500 INJECTION, POWDER, FOR SOLUTION INTRAMUSCULAR; INTRAVENOUS at 15:10

## 2023-04-06 RX ADMIN — ENOXAPARIN SODIUM 40 MILLIGRAM(S): 100 INJECTION SUBCUTANEOUS at 18:12

## 2023-04-06 RX ADMIN — DIATRIZOATE MEGLUMINE 30 MILLILITER(S): 180 INJECTION, SOLUTION INTRAVESICAL at 04:30

## 2023-04-06 RX ADMIN — SODIUM CHLORIDE 1000 MILLILITER(S): 9 INJECTION INTRAMUSCULAR; INTRAVENOUS; SUBCUTANEOUS at 04:10

## 2023-04-06 RX ADMIN — Medication 300 MILLIGRAM(S): at 18:36

## 2023-04-06 RX ADMIN — RISPERIDONE 0.5 MILLIGRAM(S): 4 TABLET ORAL at 10:11

## 2023-04-06 NOTE — ED ADULT NURSE NOTE - OBJECTIVE STATEMENT
Received via stretcher, rapid response pt from [facility] with chief complaints of possible seizure. Per report from rapid response team, pt was found lying on the floor with urine. Upon assessment, with spontaneous eye opening however unable to answer questions nor follow commands.     +PERRLA. Nonverbal s/sx of pain and discomfort not noted. Resps even and nonlabored. Moves all extremities.  No obvious trauma/injury/deformity noted. Patient oriented to ED area. All needs attended. POC reviewed. Placed on continuos cardiac monitoring. Fall risk precautions maintained. Purposeful proactive hourly rounding in progress.     Patient placed close proximity to RN. Patient placed in yellow gown. All needs attended. Safety precautions maintained. Upon arrival, patient placed on constant observation for safety.

## 2023-04-06 NOTE — PROCEDURE NOTE - NSSITEPREP_SKIN_A_CORE
chlorhexidine/povidone-iodine ( under 2 weeks of age or 1500 grams)/Adherence to aseptic technique: hand hygiene prior to donning barriers (gown, gloves), don cap and mask, sterile drape over patient

## 2023-04-06 NOTE — PROCEDURE NOTE - NSINFORMCONSENT_GEN_A_CORE
from [facility]/Benefits, risks, and possible complications of procedure explained to patient/caregiver who verbalized understanding and gave verbal consent.

## 2023-04-06 NOTE — PROVIDER CONTACT NOTE (CHANGE IN STATUS NOTIFICATION) - BACKGROUND
pt admitted to [facility] for the management/ treatment of psychosis
Admitted to unit 0[facility] 4/4 for psychosis.

## 2023-04-06 NOTE — PROGRESS NOTE ADULT - PROBLEM SELECTOR PLAN 1
No personal or family hx of psych/neuro illnesses. Pt presented to ED 4/4 w/ delusions/paranoia x 3 days, involuntarily admitted to inpatient psychiatry for psychosis. 4/6 AM pt had an unwitnessed fall, urinary incontinence, and AMS, rapid response called and pt was brought to ED. Labs s/f new leukocytosis (9.05 --> 14.48), pt has tachycardia however remains afebrile. Lactate wnl, CK wnl. CTH negative. CTAP w/ markedly distended bladder w/ adjacent fat stranding & trace fluid, mild R hydroureteronephrosis. Suprapubic tenderness on exam, however UA +ketones otherwise negative. Friend catheter placed in ED. Unclear etiology of AMS, ddx includes CNS infectious or inflammatory process vs primary psychiatric disorder. Patient underwent MRI w/wo contrast without acute findings. LP performed pending results.   - folate 1 mg IV qd  - thiamine 500 mg IV q8h x 3 days  - NPO  - f/u ESR, CRP, vitamin B12, folate, HIV, syphilis, TSH, isopropanol, methanol, salicylate level, serum osm, lyme IgG/IgM  - vEEG monitoring  - MR head w/ & w/o IV contrast (obtain prior to LP)  - LP  - neuro consulted, f/u recs  - epilepsy consulted, f/u recs  - psych consulted, f/u recs No personal or family hx of psych/neuro illnesses. Pt presented to ED 4/4 w/ delusions/paranoia x 3 days, involuntarily admitted to inpatient psychiatry for psychosis. 4/6 AM pt had an unwitnessed fall, urinary incontinence, and AMS, rapid response called and pt was brought to ED. Labs s/f new leukocytosis (9.05 --> 14.48), pt has tachycardia however remains afebrile. Lactate wnl, CK wnl. CTH negative. CTAP w/ markedly distended bladder w/ adjacent fat stranding & trace fluid, mild R hydroureteronephrosis. Suprapubic tenderness on exam, however UA +ketones otherwise negative. Friend catheter placed in ED. Unclear etiology of AMS, ddx includes CNS infectious or inflammatory process vs primary psychiatric disorder. Patient underwent MRI w/wo contrast without acute findings. LP performed pending results.   - folate 1 mg IV qd and thiamine 500 mg IV q8h x 3 days  - Start CTx, Vancomycin,   - NPO, patient would not tolerate S&S at this time.   - f/u ESR, CRP, vitamin B12, folate, HIV, syphilis, TSH, isopropanol, methanol, salicylate level, serum osm, lyme IgG/IgM  - vEEG monitoring  - neuro consulted, f/u recs  - epilepsy consulted, f/u recs  - psych consulted, f/u recs

## 2023-04-06 NOTE — ED PROVIDER NOTE - CLINICAL SUMMARY MEDICAL DECISION MAKING FREE TEXT BOX
rapid response called, pt found unresponsive, urinary incontinent, concern for possible seizure. afebrile, no focal deficits on exam, pt slow to answer questions. with lower abd tenderness on exam  -check labs  -ekg  -cxr  -CT head r/o ICH  -CT a/p to r/o intraabd path

## 2023-04-06 NOTE — PROGRESS NOTE ADULT - SUBJECTIVE AND OBJECTIVE BOX
***********Transfer from [facility] admission to Telemetry***********    This is a 27-year-old male with no significant past medical history presented to [facility] for delusions/paranoia x 3 days, was involuntarily admitted to inpatient psychiatry for psychosis. Per report from [facility] nursing staff and technicians the patient was noted to sleep for the entirety of the dayshift  with only 1-2 words via  patient refused medications at that time. Pending discussion with primary team in [facility]. Per admitting team in the morning of 4/6 AM pt had an unwitnessed fall, urinary incontinence, and AMS, rapid response called and pt was brought to ED. Patient was admitted to [facility] for further encephalopathy workup but noted to acutely altered in the ED so a further rapid response was called. Neurology was consulted for assistance in workup. Patient was noted to have received one dose of Risperdal while in the ED prior to mental status change. Transferred to [facility] for telemetry. Patient underwent brain MRI w/wo contrast for further workup Patient is now s/p LP with neurology team.     INTERVAL HPI/OVERNIGHT EVENTS:  Patient was seen and examined at bedside. Case discussed with attending physician.     Patient noted to have limited mentation but also intermittently tracks with eyes and at one point looks at nursing staff and reports "that is not my phone" when looking at her cellphone. Patient is otherwise non-verbal, non-communicative.     VITAL SIGNS:  T(F): 99.5 (23 @ 14:30)  HR: 80 (23 @ 13:49)  BP: 141/85 (23 @ 13:49)  RR: 16 (23 @ 13:49)  SpO2: 100% (23 @ 13:49)  Wt(kg): --    PHYSICAL EXAM:    Constitutional: Thin, young male in no acute distress, patient is lying on bed without motion and with eyes half open.   HEENT: Sclera non-icteric, PERRL with micro-eye movements not nystagmus per Neuro, on retraction of eyelids patient will roll superior, neck supple, trachea midline, patient with tooth discoloration on all teeth,   Respiratory: Clear to auscultation bilaterally, does not take deep breaths on command.   Cardiovascular: Regular rate and rhythm, normal S1S2.  Gastrointestinal: soft, non-tender and non-distended, Normoactive bowel sounds.  Extremities: Warm, well perfused, pulses equal bilateral upper and lower extremities, no edema.   Back: no cervical spine step-offs, no thoracic/l spine step-offs noted.   Neurological: Intermittent verbalizations as above but otherwise appears catatonic, (+) gag reflex, (+) corneal reflex, on elevation of hand above face patient will hold arm at length, normal muscle tone in upper extremities but unable to assess strength, patient will slowly allow arm to fall to the side, lower extremities will fall to the bed. (-) kerning.   Skin: Normal temperature, cool extremities but noted to be on return from MRI.     MEDICATIONS  (STANDING):  acyclovir IVPB      acyclovir IVPB 800 milliGRAM(s) IV Intermittent once  acyclovir IVPB 800 milliGRAM(s) IV Intermittent every 8 hours  cefTRIAXone   IVPB 2000 milliGRAM(s) IV Intermittent every 12 hours  enoxaparin Injectable 40 milliGRAM(s) SubCutaneous every 24 hours  folic acid Injectable 1 milliGRAM(s) IV Push every 24 hours  thiamine IVPB 500 milliGRAM(s) IV Intermittent every 8 hours  vancomycin  IVPB 1500 milliGRAM(s) IV Intermittent every 12 hours    MEDICATIONS  (PRN):      Allergies    No Known Allergies    Intolerances        LABS:                        16.8   14.48 )-----------( 307      ( 2023 03:47 )             47.4     04-06    143  |  106  |  17  ----------------------------<  105<H>  3.9   |  24  |  0.80    Ca    9.9      2023 11:10  Phos  3.7     04-06  Mg     2.0     04-06    TPro  7.6  /  Alb  4.6  /  TBili  2.2<H>  /  DBili  x   /  AST  12  /  ALT  16  /  AlkPhos  58  04-06    PT/INR - ( 2023 03:48 )   PT: 15.5 sec;   INR: 1.30          PTT - ( 2023 03:48 )  PTT:28.6 sec  Urinalysis Basic - ( 2023 11:18 )    Color: Yellow / Appearance: Clear / S.025 / pH: x  Gluc: x / Ketone: >=80 mg/dL  / Bili: Negative / Urobili: 1.0 E.U./dL   Blood: x / Protein: Trace mg/dL / Nitrite: NEGATIVE   Leuk Esterase: NEGATIVE / RBC: < 5 /HPF / WBC < 5 /HPF   Sq Epi: x / Non Sq Epi: x / Bacteria: Present /HPF          RADIOLOGY & ADDITIONAL TESTS:  Reviewed

## 2023-04-06 NOTE — H&P ADULT - NSHPLABSRESULTS_GEN_ALL_CORE
.  LABS:                         16.8   14.48 )-----------( 307      ( 2023 03:47 )             47.4     04-    141  |  104  |  18  ----------------------------<  123<H>  4.0   |  24  |  0.82    Ca    10.4      2023 03:48  Mg     1.9     04-06    TPro  8.5<H>  /  Alb  4.8  /  TBili  2.3<H>  /  DBili  x   /  AST  15  /  ALT  19  /  AlkPhos  65  04-06    PT/INR - ( 2023 03:48 )   PT: 15.5 sec;   INR: 1.30          PTT - ( 2023 03:48 )  PTT:28.6 sec  Urinalysis Basic - ( 2023 03:48 )    Color: Yellow / Appearance: Clear / S.025 / pH: x  Gluc: x / Ketone: >=80 mg/dL  / Bili: Negative / Urobili: 1.0 E.U./dL   Blood: x / Protein: Trace mg/dL / Nitrite: NEGATIVE   Leuk Esterase: NEGATIVE / RBC: 5-10 /HPF / WBC < 5 /HPF   Sq Epi: x / Non Sq Epi: x / Bacteria: Present /HPF            Lactate, Blood: 1.0 mmol/L ( @ 03:47)      RADIOLOGY, EKG & ADDITIONAL TESTS: Reviewed.

## 2023-04-06 NOTE — BH INPATIENT PSYCHIATRY DISCHARGE NOTE - NSDCPROCEDURESFT_PSY_ALL_CORE
ACC: [id number] EXAM:  CT BRAIN   ORDERED BY: VALERIE TESFAYE     PROCEDURE DATE:  04/06/2023          INTERPRETATION:  IMargot MD, have reviewed the images and the   report and agree with the findings.    INDICATIONS: Altered mental status    TECHNIQUE: Serial axial images were obtained from the skull base to the   vertex without the use of intravenous contrast. Sagittal and coronal   images were reformatted.    COMPARISON EXAMINATION: CT head dated 4/4/2023    FINDINGS:  VENTRICLES AND SULCI: Parenchymal volume is commensurate with patient   age. No hydrocephalus.  INTRA-AXIAL: No intracranial mass, acute hemorrhage, midline shift or   acute transcortical infarct is seen. The gray-white differentiation is   preserved.  EXTRA-AXIAL: No extra-axial fluid collection is present.  VISUALIZED SINUSES: No air-fluid levels are identified.  VISUALIZED MASTOIDS: Clear.  CALVARIUM: No calvarial fracture.      IMPRESSION: No acute intracranial hemorrhage, acute transcortical   infarction, extra-axial fluid collection, or hydrocephalus.    --- End of Report ---

## 2023-04-06 NOTE — ED ADULT NURSE NOTE - ED STAT RN HANDOFF TIME
Is This A New Presentation, Or A Follow-Up?: Nail Disorder How Severe Is It?: mild Additional History: Reports getting nails done at new [facility] and upon removal of synthetic nails nails were thin and brittle and tore back, as new nail is growing out it is normal but is slow process, has been hydrating nails, has had synthetic nails off and on x 8 + years but reports in between having new nails done would take 1 week break and nails were normal, no acute health changes 07:41

## 2023-04-06 NOTE — CONSULT NOTE ADULT - ASSESSMENT
This is 27M with no significant past medical history brought to ED from 8[facility]- psych as a rapid response after being found on the floor of his room unresponsive, having urinated on himself. On neuro exam, patient's appears somewhat somnolent, unable to engage or follow commands. Unsure if patient's presentation could be seizure in nature. No documented history of seizures obtained from chart, though collateral would need to be obtained from family.    Recommendations  - Obtain 1 hr vEEG  - CMP, CBC, UA, Utox reviwed  - CTH reviewed  - will continue to follow      Discussed with Attending 27 year-old male with no significant past medical history brought to ED from 8U- psych as a rapid response after being found on the floor of his room unresponsive, having urinated on himself. On neuro exam, patient's appears somewhat somnolent, unable to engage or follow commands. Unsure if patient's presentation could be seizure in nature. No documented history of seizures obtained from chart, though collateral would need to be obtained from family.    Recommendations  - Obtain 1 hr vEEG  - CMP, CBC, UA, Utox reviwed  - CTH reviewed  - will continue to follow      Discussed with Attending 27 year-old male with no significant past medical history brought to ED from 8U- psych as a rapid response after being found on the floor of his room unresponsive, having urinated on himself. Unclear etiology for acute cognitive and behavioral changes given no personal hx of neurological or psychiatric illnesses, and hereby further diagnostic imagings are warranted. Differential diagnosis include CNS infectious or inflammatory process vs primary psychiatric disorder.     Recommendations  - Please obtain vEEG monitoring   - CMP, CBC, UA, Utox reviewed  - CTH reviewed  - Please obtain LP  - Please MRI brain w/wo contrast (will need to obtain prior to LP)   - will continue to follow      Discussed with Attending

## 2023-04-06 NOTE — BH INPATIENT PSYCHIATRY DISCHARGE NOTE - NSDCCPCAREPLAN_GEN_ALL_CORE_FT
PRINCIPAL DISCHARGE DIAGNOSIS  Diagnosis: Altered mental status  Assessment and Plan of Treatment: Pt transferred to ED and admitted to medicine for further work up

## 2023-04-06 NOTE — H&P ADULT - ASSESSMENT
28 y/o M w/ no significant PMH, presented to ED 4/4 w/ delusions/paranoia x 3 days, involuntarily admitted to inpatient psychiatry for psychosis. 4/6 AM pt had an unwitnessed fall, urinary incontinence, and AMS, rapid response called and pt was brought to ED. Admitted to [facility] for encephalopathy workup.

## 2023-04-06 NOTE — PROCEDURE NOTE - NSPROCDETAILS_GEN_ALL_CORE
Back/location identified, draped/prepped, sterile technique used, needle inserted/introduced/area cleaned in sterile fashion

## 2023-04-06 NOTE — ED ADULT NURSE REASSESSMENT NOTE - NS ED NURSE REASSESS COMMENT FT1
Patient education regarding ny catheter insertion done. Ny catheter f14 inserted using sterile technique, draining by gravity, secured with StatLock. Second RN Jonah present to confirm sterility. Initial output of 1500cc dark yellow urine noted. MD Taveras made aware.

## 2023-04-06 NOTE — ED ADULT TRIAGE NOTE - CHIEF COMPLAINT QUOTE
Pt arrives via stretcher from 8 [facility]is as rapid response s/p ? seizure, found on floor of room, lying in urine. Suspected seizure activity per rapid response team. Pt opens eyes to VS, otherwise does not respond verbally or follow simple commands.

## 2023-04-06 NOTE — BH INPATIENT PSYCHIATRY DISCHARGE NOTE - OTHER PAST PSYCHIATRIC HISTORY (INCLUDE DETAILS REGARDING ONSET, COURSE OF ILLNESS, INPATIENT/OUTPATIENT TREATMENT)
The patient is a 26 yo male living with his parents. No formal psychiatric hx, no known previous hospitalizations, no suicide attempts, not currently in treatment or taking medications. No drug/etoh abuse hx. No known legal hx. Patient brought in my family-brother to [facility] ED for bizarre behavior for the past several days. Noted to be paranoid in ED, utox negative for all substances. Head CT unremarkable. Pt admitted to [facility] for psychosis.

## 2023-04-06 NOTE — CHART NOTE - NSCHARTNOTEFT_GEN_A_CORE
28 y/o M w/ no significant PMH, initially brought in to the ED by family on 4/4/23 for several days of bizarre behavior and was evaluated by behavioral health. Patient was found to be unresponsive in psych unit and was transferred to medicine for admission.     Rapid response called while patient in ER holding pending dispo to regional medical floors. Rapid called for worsening mental status and concern for airway management. ICU team arrived bedside. Patient is nonverbal and only responsive to noxious stimuli, protecting airway. Vital signs: 97 tenp, HR 80s, BP: 130/80, RR 14, O2 100 on RA. . Physical exam: PEERL, 2mm, EOMI, neck supple- no rigidity, neg Brudzinski and kernig, downgoing babinski, reflexes +2 throughout, +gag reflex- protecting airway. Intranasal narcan given twice with minimal response. Labs sent including: ABG, CBC, CMP, utox, acetaminophen, salicylates, alcohol level, TSH, CK, lactate, UA.    CT head stat done and unremarkable. ABG without hypercapnia.     Intranasal narcan given twice with minimal response.   1 L of NS given.  Thimaine 500 IV given   1 amp of dextrose given     Differential includes: seizure, psychogenic- psychosis, CNS infection (less likely), toxic ingestion    Plan:   Patient is protecting airway , admission to telemetry for close monitoring and neuro checks- does not require ICU at this time.   - LP to be done once in tele   - MRI brain to be done while in ED   - follow up lab work up as above   - follow up MRI brain and CT scans   - Neuro recs, with neuro checks and close monitoring   - vEEG   - empiric abx coverage for meningitis       Plan discussed with Critical care fellow and attending.

## 2023-04-06 NOTE — PROVIDER CONTACT NOTE (CHANGE IN STATUS NOTIFICATION) - SITUATION
Pt found on floor next to bed, unresponsive.
Pt found on the floor unresponsive,NO visible injury, incontinent of urine. vital signs and finger stick done( see flow sheet)   rapid response called pt placed on a stretcher, ekg placed and patient transfer to ED.

## 2023-04-06 NOTE — H&P ADULT - PROBLEM SELECTOR PLAN 2
Pt met 2/4 SIRS criteria in ED (leukocytosis, tachycardia). BP 140s/70s-100s. Low suspicion for infection at this time.  - hold off on abx at this time  - continue to monitor VS  - monitor CBC  - if pt spikes fever, BCx and full workup

## 2023-04-06 NOTE — ED PROVIDER NOTE - PROGRESS NOTE DETAILS
large amount of urine on CT - ny placed ~1500cc yellow urine return. will consult epilespy pt on risperidone on 8U - may be cause of urinary retention large amount of urine on CT - ny placed ~1500cc yellow urine return. will consult epilepsy pt seen by epilepsy- recommend

## 2023-04-06 NOTE — H&P ADULT - HISTORY OF PRESENT ILLNESS
28 y/o M w/ no significant PMH, initially brought in to the ED by family on 4/4/23 for several days of bizarre behavior and was evaluated by behavioral health. Involuntarily admitted to inpatient psych for workup and treatment of acute psychosis (ddx includes brief psychotic disorder, schizophrenia, substance intoxication). 4/6/23 early AM pt had an unwitnessed fall in his room, team found him unresponsive w/ urine incontinence. Rapid response was called and he was brought to the ED.    ED course:  Vitals: temp 98.1, , /101, RR 16, SpO2 100% on RA.  Labs: WBC 14.48 (from 9.05), CBC otherwise unremarkable. bilirubin 2.3, CMP otherwise wnl. UA +ketones, trace protein, trace blood, 5-10 RBCs +bacteria, otherwise negative. utox negative.  EKG: NSR w/ no ischemic changes.  Imaging:   *CT head: no acute intracranial hemorrhage, acute transcortical infarction, extra-axial fluid collection, or hydrocephalus.  *CTAP: grossly enlarged bladder w/ surrounding ascites & fat tissue stranding; right-sided hydronephrosis & hydroureter.  Interventions: 1L NS bolus  Consults: neuro 28 y/o M w/ no significant PMH, initially brought in to the ED by family on 4/4/23 for several days of bizarre behavior and was evaluated by behavioral health. Involuntarily admitted to inpatient psych for workup and treatment of acute psychosis (ddx includes brief psychotic disorder, schizophrenia, substance intoxication). 4/6/23 early AM pt had an unwitnessed fall in his room, team found him unresponsive w/ urine incontinence. Rapid response was called and he was brought to the ED.    ED course:  Vitals: temp 98.1, , /101, RR 16, SpO2 100% on RA.  Labs: WBC 14.48 (from 9.05), CBC otherwise unremarkable. bilirubin 2.3, CMP otherwise wnl. lactate wnl, CK wnl. UA +ketones, trace protein, trace blood, 5-10 RBCs +bacteria, otherwise negative. utox negative.  EKG: NSR w/ no ischemic changes.  Imaging:   *CT head: no acute intracranial hemorrhage, acute transcortical infarction, extra-axial fluid collection, or hydrocephalus.  *CTAP: grossly enlarged bladder w/ surrounding ascites & fat tissue stranding; right-sided hydronephrosis & hydroureter.  Interventions: 1L NS bolus  Consults: neuro 28 y/o M w/ no significant PMH, initially brought in to the ED by family on 4/4/23 for several days of bizarre behavior and was evaluated by behavioral health. Involuntarily admitted to inpatient psych for workup and treatment of acute psychosis (ddx includes brief psychotic disorder, schizophrenia, substance intoxication). 4/6/23 early AM pt had an unwitnessed fall in his room, team found him unresponsive w/ urine incontinence. Rapid response was called and he was brought to the ED. Upon evaluation in ED, pt is awake and alert but not answering questions. With the help of an , pt is able to state his name and "I am shaking." ROS limited by patient's mental status.    Per collateral from pt's brother: he is normally A&Ox3 and talkative, works 3 jobs as a superintendent, does not smoke/drink alcohol/use recreational drugs. Pt lives w/ his parents and his wife lives in [facility] and pt sends money to her. Pt's brother believes pt's wife broke up w/ him recently, however details are not clear as the pt and wife are private about this. Pt has been stressed, sleep deprived, no SI/HI. On 4/2/23 evening pt began complaining of "people coming after me, wanting to hurt me, hate me" and sounded very scared. Of note, he was complaining of "problems with his head" such as headaches and fatigue at least 1 month ago.    ED course:  Vitals: temp 98.1, , /101, RR 16, SpO2 100% on RA.  Labs: WBC 14.48 (from 9.05), CBC otherwise unremarkable. bilirubin 2.3, CMP otherwise wnl. lactate wnl, CK wnl. UA +ketones, trace protein, trace blood, 5-10 RBCs +bacteria, otherwise negative. utox negative.  EKG: NSR w/ no ischemic changes.  Imaging:   *CT head: no acute intracranial hemorrhage, acute transcortical infarction, extra-axial fluid collection, or hydrocephalus.  *CTAP: markedly distended bladder w/ adjacent fat stranding & trace fluid, acute urinary retention or UTI; mild R hydroureteronephrosis.  *CXR: lungs clear; no infiltrate pleural effusion or PTX; electronic wires/device lower chest; no acute bone abnormality.  Interventions: 1L NS bolus  Consults: neuro

## 2023-04-06 NOTE — PROVIDER CONTACT NOTE (FALL NOTIFICATION) - ASSESSMENT
Pt found laying on floor next to bed, unresponsive and noted to be soiled. VS: BP:118/82, HR:100, R:20, O2 saturation: 97 and tamp: 98.6. Pt FS at time 103. Rapid response activated.

## 2023-04-06 NOTE — ED PROVIDER NOTE - OBJECTIVE STATEMENT
27M brought down from 8U as a rapid response. per team pt found on the floor of his room. states unresponsive and had incontinence of urine. pt with no PMH, recently admitted to 8U for bizarre behavior including paranoia. pt was involuntary admit to psych.

## 2023-04-06 NOTE — H&P ADULT - PROBLEM SELECTOR PLAN 1
No personal or family hx of psych/neuro illnesses. Pt presented to ED 4/4 w/ delusions/paranoia x 3 days, involuntarily admitted to inpatient psychiatry for psychosis. 4/6 AM pt had an unwitnessed fall, urinary incontinence, and AMS, rapid response called and pt was brought to ED. Labs s/f new leukocytosis (9.05 --> 14.48), pt has tachycardia however remains afebrile. Lactate wnl, CK wnl. CTH negative. CTAP w/ markedly distended bladder w/ adjacent fat stranding & trace fluid, mild R hydroureteronephrosis. Suprapubic tenderness on exam, however UA +ketones otherwise negative. Friend catheter placed in ED. Unclear etiology of AMS, ddx includes CNS infectious or inflammatory process vs primary pscyhiatric disorder.  - folate 1 mg IV qd  - thiamine 500 mg IV q8h x 3 days  - NPO  - speech & swallow consulted  - f/u ESR, CRP, vitamin B12, folate, HIV, syphilis, TSH, isopropanol, methanol, salicylate level, serum osm, lyme IgG/IgM  - vEEG monitoring  - MR head w/ & w/o IV contrast (obtain prior to LP)  - LP  - neuro consulted, f/u recs  - epilepsy consulted, f/u recs  - psych consulted, f/u recs

## 2023-04-06 NOTE — PROGRESS NOTE ADULT - ASSESSMENT
28 y/o M w/ no significant PMH, presented to ED 4/4 w/ delusions/paranoia x 3 days, involuntarily admitted to inpatient psychiatry for psychosis. 4/6 AM pt had an unwitnessed fall, urinary incontinence, and AMS, rapid response called and pt was brought to ED. Admitted to [facility] for encephalopathy workup. Relaxed

## 2023-04-06 NOTE — H&P ADULT - PROBLEM SELECTOR PLAN 4
F: None   E: Replete as necessary K>4 Mg>2  N: NPO until speech & swallow assessment  DVT Prophylaxis: lovenox 40 mg SQ qd  GI prophylaxis: None  CODE STATUS: FULL CODE  Dispo: F

## 2023-04-06 NOTE — CONSULT NOTE ADULT - SUBJECTIVE AND OBJECTIVE BOX
HPI  This is 27M with no significant past medical history brought to ED from - [facility] as a rapid response after being found on the floor of his room unresponsive, having urinated on himself.  Patient was admitted to [facility] for pyschosis after mentioning hearing voices and stating to his brother in law that people were after him. Unable to obtain further history and ROS as patient was not answering questions or following commands. Epilepsy risk factors will be assesed after calling his family later this morning.      Epilepsy risk factors:  Head injury with subsequent LOC?:  Febrile seizures in infancy?:  Hx of CNS infection?:  Family hx of epilepsy?:  Known CNS pathology?:  Birth history:     Prior AEDs: None     Prior imaging     Prior EEGs     Other diagnostic work-up     ROS  Unable to obtain     PAST MEDICAL & SURGICAL HISTORY:  No pertinent past medical history      No pertinent past medical history      No significant past surgical history           FAMILY HISTORY:       Social History:  Alcohol, illicits, smoking?: None  Occupation:      Allergies  No Known Allergies       MEDICATIONS  (STANDING):    MEDICATIONS  (PRN):       T(C): 37.7 (04-06-23 @ 06:47), Max: 37.7 (04-06-23 @ 06:47)  HR: 80 (04-06-23 @ 06:47) (80 - 110)  BP: 146/79 (04-06-23 @ 06:47) (122/64 - 147/101)  RR: 18 (04-06-23 @ 06:47) (16 - 18)  SpO2: 98% (04-06-23 @ 06:47) (98% - 100%)  Wt(kg): --    PHYSICAL EXAMINATION:  General: Well-developed, well nourished, in no acute distress.  Eyes: Conjunctiva and sclera clear.  Neurologic:  - Mental Status: awake, oriented to person, somewhat somnolent Not following commands  - Eyes: Pupils are equal, round, and reactive to light; no nystagmus   - CN: Unable to fully asses   - Motor:  Normal muscle bulk and tone throughout.  - Reflexes:  2+ and symmetric at the biceps, triceps, brachioradialis, knees, and ankles.  Plantar responses flexor.  - Sensory:  withdraws to pain  - Coordination:  Finger-nose-finger and heel-knee-shin intact without dysmetria.  Rapid alternating hand movements intact.  - Gait:   Normal steps, base, arm swing, and turning.  Heel and toe walking are normal.  Tandem gait is normal.  Romberg testing is negative.           Labs  CBC Full  -  ( 06 Apr 2023 03:47 )  WBC Count : 14.48 K/uL  RBC Count : 5.42 M/uL  Hemoglobin : 16.8 g/dL  Hematocrit : 47.4 %  Platelet Count - Automated : 307 K/uL  Mean Cell Volume : 87.5 fl  Mean Cell Hemoglobin : 31.0 pg  Mean Cell Hemoglobin Concentration : 35.4 gm/dL  Auto Neutrophil # : 12.20 K/uL  Auto Lymphocyte # : 1.25 K/uL  Auto Monocyte # : 0.89 K/uL  Auto Eosinophil # : 0.01 K/uL  Auto Basophil # : 0.07 K/uL  Auto Neutrophil % : 84.3 %  Auto Lymphocyte % : 8.6 %  Auto Monocyte % : 6.1 %  Auto Eosinophil % : 0.1 %  Auto Basophil % : 0.5 %    04-06    141  |  104  |  18  ----------------------------<  123<H>  4.0   |  24  |  0.82    Ca    10.4      06 Apr 2023 03:48  Mg     1.9     04-06    TPro  8.5<H>  /  Alb  4.8  /  TBili  2.3<H>  /  DBili  x   /  AST  15  /  ALT  19  /  AlkPhos  65  04-06    LIVER FUNCTIONS - ( 06 Apr 2023 03:48 )  Alb: 4.8 g/dL / Pro: 8.5 g/dL / ALK PHOS: 65 U/L / ALT: 19 U/L / AST: 15 U/L / GGT: x           PT/INR - ( 06 Apr 2023 03:48 )   PT: 15.5 sec;   INR: 1.30          PTT - ( 06 Apr 2023 03:48 )  PTT:28.6 sec      EEG: HPI  This is 27M with no significant past medical history brought to ED from [facility] as a rapid response after being found on the floor of his room unresponsive, having urinated on himself.  Patient was admitted to psych for psychosis after mentioning hearing voices and stating to his brother in law that people were after him. Unable to obtain further history and ROS as patient was not answering questions or following commands. Epilepsy risk factors will be assesed after calling his family later this morning.      Epilepsy risk factors:  Head injury with subsequent LOC?:  Febrile seizures in infancy?:  Hx of CNS infection?:  Family hx of epilepsy?:  Known CNS pathology?:  Birth history:     Prior AEDs: None     Prior imaging     Prior EEGs     Other diagnostic work-up     ROS  Unable to obtain     PAST MEDICAL & SURGICAL HISTORY:  No pertinent past medical history      No pertinent past medical history      No significant past surgical history           FAMILY HISTORY:       Social History:  Alcohol, illicits, smoking?: None  Occupation:      Allergies  No Known Allergies       MEDICATIONS  (STANDING):    MEDICATIONS  (PRN):       T(C): 37.7 (04-06-23 @ 06:47), Max: 37.7 (04-06-23 @ 06:47)  HR: 80 (04-06-23 @ 06:47) (80 - 110)  BP: 146/79 (04-06-23 @ 06:47) (122/64 - 147/101)  RR: 18 (04-06-23 @ 06:47) (16 - 18)  SpO2: 98% (04-06-23 @ 06:47) (98% - 100%)  Wt(kg): --    PHYSICAL EXAMINATION:  General: Well-developed, well nourished, in no acute distress.  Eyes: Conjunctiva and sclera clear.  Neurologic:  - Mental Status: awake, oriented to person, somewhat somnolent Not following commands  - Eyes: Pupils are equal, round, and reactive to light; no nystagmus   - CN: Unable to fully asses   - Motor:  Normal muscle bulk and tone throughout.  - Reflexes:  2+ and symmetric at the biceps, triceps, brachioradialis, knees, and ankles.  Plantar responses flexor.  - Sensory:  withdraws to pain  - Coordination:  unable to asses  - Gait: Deferred           Labs  CBC Full  -  ( 06 Apr 2023 03:47 )  WBC Count : 14.48 K/uL  RBC Count : 5.42 M/uL  Hemoglobin : 16.8 g/dL  Hematocrit : 47.4 %  Platelet Count - Automated : 307 K/uL  Mean Cell Volume : 87.5 fl  Mean Cell Hemoglobin : 31.0 pg  Mean Cell Hemoglobin Concentration : 35.4 gm/dL  Auto Neutrophil # : 12.20 K/uL  Auto Lymphocyte # : 1.25 K/uL  Auto Monocyte # : 0.89 K/uL  Auto Eosinophil # : 0.01 K/uL  Auto Basophil # : 0.07 K/uL  Auto Neutrophil % : 84.3 %  Auto Lymphocyte % : 8.6 %  Auto Monocyte % : 6.1 %  Auto Eosinophil % : 0.1 %  Auto Basophil % : 0.5 %    04-06    141  |  104  |  18  ----------------------------<  123<H>  4.0   |  24  |  0.82    Ca    10.4      06 Apr 2023 03:48  Mg     1.9     04-06    TPro  8.5<H>  /  Alb  4.8  /  TBili  2.3<H>  /  DBili  x   /  AST  15  /  ALT  19  /  AlkPhos  65  04-06    LIVER FUNCTIONS - ( 06 Apr 2023 03:48 )  Alb: 4.8 g/dL / Pro: 8.5 g/dL / ALK PHOS: 65 U/L / ALT: 19 U/L / AST: 15 U/L / GGT: x           PT/INR - ( 06 Apr 2023 03:48 )   PT: 15.5 sec;   INR: 1.30          PTT - ( 06 Apr 2023 03:48 )  PTT:28.6 sec    < from: CT Head No Cont (04.06.23 @ 05:44) >  No acute intracranial hemorrhage, acute transcortical   infarction, extra-axial fluid collection, or hydrocephalus.    < end of copied text >   HPI:  27 year-old [place] man with no significant past medical history who was brought to ED from - [facility] as a rapid response after being found on the floor of his room unresponsive, having urinated on himself.  Patient was admitted to [facility] for psychosis after mentioning hearing voices and stating to his brother in law that people were after him. Unable to obtain further history and ROS as patient was not answering questions or following commands. Patient's brother was contacted in AM, and report that he was totally normal on Sunday, and had a drastic change in his behavior later that night. He was frantic about "people out to get him", and report feeling tired, severe headache, and forgetful as per his brother.      Epilepsy risk factors:  Head injury with subsequent LOC?:  Febrile seizures in infancy?:  Hx of CNS infection?:  Family hx of epilepsy?:  Known CNS pathology?:  Birth history:     Prior AEDs: None  Prior imaging  Prior EEGs  Other diagnostic work-up     ROS  Unable to obtain     PAST MEDICAL & SURGICAL HISTORY:  No pertinent past medical history  No pertinent past medical history  No significant past surgical history     Social History:  Alcohol, illicits, smoking?: None  Occupation: Superintendent      Allergies  No Known Allergies    VITAL SIGNS:   T(C): 37.7 (04-06-23 @ 06:47), Max: 37.7 (04-06-23 @ 06:47)  HR: 80 (04-06-23 @ 06:47) (80 - 110)  BP: 146/79 (04-06-23 @ 06:47) (122/64 - 147/101)  RR: 18 (04-06-23 @ 06:47) (16 - 18)  SpO2: 98% (04-06-23 @ 06:47) (98% - 100%)  Wt(kg): --    PHYSICAL EXAMINATION:  General: Well-developed, well nourished, in no acute distress.  Eyes: Conjunctiva and sclera clear.  Neurologic:  - Mental Status: awake, oriented to person, somewhat somnolent Not following commands  - Eyes: Pupils are equal, round, and reactive to light; no nystagmus   - CN: Unable to fully asses   - Motor:  Normal muscle bulk and tone throughout.  - Reflexes:  2+ and symmetric at the biceps, triceps, brachioradialis, knees, and ankles.  Plantar responses flexor.  - Sensory:  withdraws to pain  - Coordination:  unable to asses  - Gait: Deferred    LABS:   CBC Full  -  ( 06 Apr 2023 03:47 )  WBC Count : 14.48 K/uL  RBC Count : 5.42 M/uL  Hemoglobin : 16.8 g/dL  Hematocrit : 47.4 %  Platelet Count - Automated : 307 K/uL  Mean Cell Volume : 87.5 fl  Mean Cell Hemoglobin : 31.0 pg  Mean Cell Hemoglobin Concentration : 35.4 gm/dL  Auto Neutrophil # : 12.20 K/uL  Auto Lymphocyte # : 1.25 K/uL  Auto Monocyte # : 0.89 K/uL  Auto Eosinophil # : 0.01 K/uL  Auto Basophil # : 0.07 K/uL  Auto Neutrophil % : 84.3 %  Auto Lymphocyte % : 8.6 %  Auto Monocyte % : 6.1 %  Auto Eosinophil % : 0.1 %  Auto Basophil % : 0.5 %    04-06    141  |  104  |  18  ----------------------------<  123<H>  4.0   |  24  |  0.82    Ca    10.4      06 Apr 2023 03:48  Mg     1.9     04-06    TPro  8.5<H>  /  Alb  4.8  /  TBili  2.3<H>  /  DBili  x   /  AST  15  /  ALT  19  /  AlkPhos  65  04-06    LIVER FUNCTIONS - ( 06 Apr 2023 03:48 )  Alb: 4.8 g/dL / Pro: 8.5 g/dL / ALK PHOS: 65 U/L / ALT: 19 U/L / AST: 15 U/L / GGT: x           PT/INR - ( 06 Apr 2023 03:48 )   PT: 15.5 sec;   INR: 1.30     PTT - ( 06 Apr 2023 03:48 )  PTT:28.6 sec    < from: CT Head No Cont (04.06.23 @ 05:44) >  No acute intracranial hemorrhage, acute transcortical   infarction, extra-axial fluid collection, or hydrocephalus.    < end of copied text >   HPI:  27 year-old [place] man with no significant past medical history who was brought to ED from [address] [place] [address] as a rapid response after being found on the floor of his room unresponsive, having urinated on himself.  Patient was admitted to psych for psychosis after mentioning hearing voices and stating to his brother in law that people were after him. Unable to obtain further history and ROS as patient was not answering questions or following commands. Patient's brother was contacted in AM, and report that he was totally normal on Sunday, and had a drastic change in his behavior later that night. He was frantic about "people out to get him", and report feeling tired, severe headache, and forgetful as per his brother.      Epilepsy risk factors:  Head injury with subsequent LOC?: Brother Denies  Febrile seizures in infancy?: Brother Denies  Hx of CNS infection?: Brother Denies  Family hx of epilepsy?: Brother Denies  Known CNS pathology?: Brother denies     Prior AEDs: None  Prior imaging  Prior EEGs  Other diagnostic work-up     ROS  Unable to obtain     PAST MEDICAL & SURGICAL HISTORY:  No pertinent past medical history  No pertinent past medical history  No significant past surgical history     Social History:  Alcohol, illicits, smoking?: None  Occupation: Superintendent      Allergies  No Known Allergies    VITAL SIGNS:   T(C): 37.7 (04-06-23 @ 06:47), Max: 37.7 (04-06-23 @ 06:47)  HR: 80 (04-06-23 @ 06:47) (80 - 110)  BP: 146/79 (04-06-23 @ 06:47) (122/64 - 147/101)  RR: 18 (04-06-23 @ 06:47) (16 - 18)  SpO2: 98% (04-06-23 @ 06:47) (98% - 100%)  Wt(kg): --    PHYSICAL EXAMINATION:  General: Well-developed, well nourished, in no acute distress.  Eyes: Conjunctiva and sclera clear.  Neurologic:  - Mental Status: awake, oriented to person, somewhat somnolent Not following commands  - Eyes: Pupils are equal, round, and reactive to light; no nystagmus   - CN: Unable to fully asses   - Motor:  Normal muscle bulk and tone throughout.  - Reflexes:  2+ and symmetric at the biceps, triceps, brachioradialis, knees, and ankles.  Plantar responses flexor.  - Sensory:  withdraws to pain  - Coordination:  unable to asses  - Gait: Deferred    LABS:   CBC Full  -  ( 06 Apr 2023 03:47 )  WBC Count : 14.48 K/uL  RBC Count : 5.42 M/uL  Hemoglobin : 16.8 g/dL  Hematocrit : 47.4 %  Platelet Count - Automated : 307 K/uL  Mean Cell Volume : 87.5 fl  Mean Cell Hemoglobin : 31.0 pg  Mean Cell Hemoglobin Concentration : 35.4 gm/dL  Auto Neutrophil # : 12.20 K/uL  Auto Lymphocyte # : 1.25 K/uL  Auto Monocyte # : 0.89 K/uL  Auto Eosinophil # : 0.01 K/uL  Auto Basophil # : 0.07 K/uL  Auto Neutrophil % : 84.3 %  Auto Lymphocyte % : 8.6 %  Auto Monocyte % : 6.1 %  Auto Eosinophil % : 0.1 %  Auto Basophil % : 0.5 %    04-06    141  |  104  |  18  ----------------------------<  123<H>  4.0   |  24  |  0.82    Ca    10.4      06 Apr 2023 03:48  Mg     1.9     04-06    TPro  8.5<H>  /  Alb  4.8  /  TBili  2.3<H>  /  DBili  x   /  AST  15  /  ALT  19  /  AlkPhos  65  04-06    LIVER FUNCTIONS - ( 06 Apr 2023 03:48 )  Alb: 4.8 g/dL / Pro: 8.5 g/dL / ALK PHOS: 65 U/L / ALT: 19 U/L / AST: 15 U/L / GGT: x           PT/INR - ( 06 Apr 2023 03:48 )   PT: 15.5 sec;   INR: 1.30     PTT - ( 06 Apr 2023 03:48 )  PTT:28.6 sec    < from: CT Head No Cont (04.06.23 @ 05:44) >  No acute intracranial hemorrhage, acute transcortical   infarction, extra-axial fluid collection, or hydrocephalus.    < end of copied text >   HPI:  27 year-old [facility] man with no significant past medical history who was brought to ED from [address] [place] [address] as a rapid response after being found on the floor of his room unresponsive, having urinated on himself.  Patient was admitted to [facility] for psychosis after mentioning hearing voices and stating to his brother in law that people were after him. Unable to obtain further history and ROS as patient was not answering questions or following commands. Patient's brother was contacted in AM, and report that he was totally normal on Sunday, and had a drastic change in his behavior later that night. He was frantic about "people out to get him", and report feeling tired, severe headache, and forgetful as per his brother. He informed his brother-in-law that he was so scared that he needed to stay with him, and he was advised to call the  and then visit. He went by his brother with a 80caps bottle of aleve with 68 caps remaining, and appeared different according to his brother. On Tuesday morning while at his brother-in-law he ambulated to the bathroom, and he fell and urinated himself (since then has not been left alone according to brother). Of note, brother reports that he checked the patients phone and he made numerous phone calls to his wife in [facility] (14-16 times / day from 3am - 7am), and he called the police on Sunday before and after he showed up to his brother-in law apartment. Brother denies identifiable seizure risks such as head trauma, family hx of seizures, meningitis or encephalitis, febrile seizures.       Epilepsy risk factors:  Head injury with subsequent LOC?: Brother Denies  Febrile seizures in infancy?: Brother Denies  Hx of CNS infection?: Brother Denies  Family hx of epilepsy?: Brother Denies  Known CNS pathology?: Brother denies     Prior AEDs: None  Prior imaging  Prior EEGs  Other diagnostic work-up     ROS  Unable to obtain     PAST MEDICAL & SURGICAL HISTORY:  No pertinent past medical history  No pertinent past medical history  No significant past surgical history     Social History:  Alcohol, illicits, smoking?: None  Occupation: Superintendent      Allergies  No Known Allergies    VITAL SIGNS:   T(C): 37.7 (04-06-23 @ 06:47), Max: 37.7 (04-06-23 @ 06:47)  HR: 80 (04-06-23 @ 06:47) (80 - 110)  BP: 146/79 (04-06-23 @ 06:47) (122/64 - 147/101)  RR: 18 (04-06-23 @ 06:47) (16 - 18)  SpO2: 98% (04-06-23 @ 06:47) (98% - 100%)  Wt(kg): --    PHYSICAL EXAMINATION:  General: Young man w/ erractic behavior and mute  Eyes: Conjunctiva and sclera clear.  Neurologic:  - Mental Status: awake, occasionally track bedside activity, and other times looking around. Disoriented to name, date and speech even with choices. Minimal speech, repeat wife, and not following commands.   - Eyes: Pupils are equal, round, and reactive to light; no nystagmus   - CN: Unable to fully assess   - Motor:  Normal muscle bulk and tone throughout.  - Reflexes:  2+ and symmetric at the biceps, triceps, brachioradialis, knees, and ankles. Plantar responses flexor.  - Sensory:  Withdraws to pain  - Coordination:  unable to asses  - Gait: Deferred    LABS:   CBC Full  -  ( 06 Apr 2023 03:47 )  WBC Count : 14.48 K/uL  RBC Count : 5.42 M/uL  Hemoglobin : 16.8 g/dL  Hematocrit : 47.4 %  Platelet Count - Automated : 307 K/uL  Mean Cell Volume : 87.5 fl  Mean Cell Hemoglobin : 31.0 pg  Mean Cell Hemoglobin Concentration : 35.4 gm/dL  Auto Neutrophil # : 12.20 K/uL  Auto Lymphocyte # : 1.25 K/uL  Auto Monocyte # : 0.89 K/uL  Auto Eosinophil # : 0.01 K/uL  Auto Basophil # : 0.07 K/uL  Auto Neutrophil % : 84.3 %  Auto Lymphocyte % : 8.6 %  Auto Monocyte % : 6.1 %  Auto Eosinophil % : 0.1 %  Auto Basophil % : 0.5 %    04-06    141  |  104  |  18  ----------------------------<  123<H>  4.0   |  24  |  0.82    Ca    10.4      06 Apr 2023 03:48  Mg     1.9     04-06    TPro  8.5<H>  /  Alb  4.8  /  TBili  2.3<H>  /  DBili  x   /  AST  15  /  ALT  19  /  AlkPhos  65  04-06    LIVER FUNCTIONS - ( 06 Apr 2023 03:48 )  Alb: 4.8 g/dL / Pro: 8.5 g/dL / ALK PHOS: 65 U/L / ALT: 19 U/L / AST: 15 U/L / GGT: x           PT/INR - ( 06 Apr 2023 03:48 )   PT: 15.5 sec;   INR: 1.30     PTT - ( 06 Apr 2023 03:48 )  PTT:28.6 sec    < from: CT Head No Cont (04.06.23 @ 05:44) >  No acute intracranial hemorrhage, acute transcortical   infarction, extra-axial fluid collection, or hydrocephalus.    < end of copied text >

## 2023-04-06 NOTE — BH INPATIENT PSYCHIATRY DISCHARGE NOTE - HPI (INCLUDE ILLNESS QUALITY, SEVERITY, DURATION, TIMING, CONTEXT, MODIFYING FACTORS, ASSOCIATED SIGNS AND SYMPTOMS)
The patient is a 28 yo male living with his parents, working part-time as a  and also as a superintendant,  (wife in [facility]), with no PMH, and no prior psych hx, no known history of substance use, who was brought in by family due to bizarre behavior for the past several days.  This is a 26y/o [place] male,  (wife in [facility]), domiciled with parents, working part time in construction and as a superintendant. Unknown medical hx. No formal psychiatric hx, no known previous hospitalizations, no suicide attempts, not currently in treatment or taking medications. No drug/etoh abuse hx. No known legal hx. Patient brought in my family-brother to [facility] ED for bizarre behavior for the past several days. Noted to be paranoid in ED, utox negative for all substances. Head CT unremarkable. Pt admitted to [address] [place] [address] for psychosis.    Interview conducted w/ [place]  #[id number]. Patient seen in his room with SW, appears very lethargic on approach, however has not received any medications since admittance to unit, minimally cooperative with interview, seems confused with questions and unable to provide much information.  reported to have difficulty hearing patient at times due to his soft speech, pt would repeat his responses. Patient asked team why was he in the hospital. He did share that prior to admission, having had suicidal ideations, but unable to provide details including intent, plan etc. When asked about hallucinations, he reported hearing noises in his head, but could not provide any additional info. He reported drinking etoh, denied drug use. Reported being in pain but could not give location or severity.     Per ED report:  On exam currently, the patient is very oddly related. He reports feeling tired but otherwise is unable to provide any psychiatric history. Thoughts seem impoverished, he often does not respond at all to questions, and even when he does his answers are often incoherent/illogical. He also has difficulty answering clearly basic demographic questions. At times he says his family thinks he is "ill" or that he knows he is not feeling well but cannot articulate his concerns. Appears internally preoccupied and guarded. He does endorse vague paranoia about others trying to harm him but refuses to elaborate on symptom.    Corroborative obtained from brother Ashu at bedside: Per brother, patient was in his usual normal seeming state up until two days ago. Brother received a call from their brother-in-law who said the patient showed up to his place and was requesting to stay there, saying that some people were after him. Ashu feels patient currently is totally not acting like himself, is not making sense and acting bizarrely. Family including parents have also noticed that patient has not been eating/sleeping in this time frame, but they also don't have any further clues as to what may be going on with the patient. Denies any family history of similar or other psychiatric illness, and says patient has never used any drugs in the past. Brother thinks a possible stressor is patient's relationship with his wife.

## 2023-04-06 NOTE — CONSULT NOTE ADULT - ATTENDING COMMENTS
28 yo M with acute cognitive and behavioral changes.  Differential diagnosis include CNS infectious or inflammatory process vs primary psychiatric disorder.

## 2023-04-06 NOTE — H&P ADULT - PROBLEM SELECTOR PLAN 3
CTAP w/ markedly distended bladder w/ adjacent fat stranding & trace fluid, mild R hydroureteronephrosis. Suprapubic tenderness on exam, however UA +ketones otherwise negative. Ny catheter placed in ED.  - d/c ny, start TOV  - bladder scans q6h  - straight cath for PVR >350 cc

## 2023-04-06 NOTE — H&P ADULT - ATTENDING COMMENTS
Patient was seen and examined with the resident team today.  I agree with the assessment and plan with the following exceptions/additions:     Briefly, this is a 26yo gentleman with no real PMH other than frequent ED visits for headaches who p/w bizarre behaviors, possibly in the setting of marital stressors, who was transferred from [facility] to the ED after being found down and unresponsive.  ED course largely unrevealing, and he was admitted to Medicine.  While waiting in ER he was noted to be more altered and lethargic by his brother, prompting a second RRT.  Exam notably for pinpoint pupils and minimally verbal but protecting his airway.  Per ED staff he had a 1:1 throughout his ED course.  Neuro at bedside to perform a LP and obtain urgent imaging.  He will be transferred to [address] [place] [address].    Would ask that the receiving team please clarify w/8UR why his tasklist manager under his [facility] chart shows he received Risperidone 0.5mg around 10AM.  He would have already been in the ED and would like to make sure this isn't a backlog for a medication he received for the first time overnight.    Yamile Ellington  [phone]

## 2023-04-06 NOTE — H&P ADULT - NSHPPHYSICALEXAM_GEN_ALL_CORE
.  VITAL SIGNS:  T(F): 99.8 (04-06-23 @ 06:47), Max: 99.8 (04-06-23 @ 06:47)  HR: 80 (04-06-23 @ 06:47) (80 - 110)  BP: 146/79 (04-06-23 @ 06:47) (122/64 - 147/101)  BP(mean): --  RR: 18 (04-06-23 @ 06:47) (16 - 18)  SpO2: 98% (04-06-23 @ 06:47) (98% - 100%)    PHYSICAL EXAM:    Constitutional: resting comfortably in bed; NAD  HEENT: NC/AT, EOMI, anicteric sclera, no nasal discharge; MMM  Neck: supple; no JVD  Respiratory: unlabored breathing, CTA B/L; no W/Rhonchi/Crackles  Cardiac: +S1/S2; RRR; no M/R/G  Gastrointestinal: soft, NT/ND; no rebound or guarding  Extremities: WWP, no clubbing or cyanosis; no peripheral edema  Musculoskeletal: NROM x4; no joint swelling, tenderness or erythema  Vascular: 2+ radial, DP/PT pulses B/L  Dermatologic: skin warm, dry and intact; no rashes, wounds, or scars  Neurologic: AAOx3; CNII-XII grossly intact; no focal deficits  Psychiatric: affect and characteristics of appearance, verbalizations, behaviors are appropriate, denies SI/HI/AH/VH .  VITAL SIGNS:  T(F): 99.8 (04-06-23 @ 06:47), Max: 99.8 (04-06-23 @ 06:47)  HR: 80 (04-06-23 @ 06:47) (80 - 110)  BP: 146/79 (04-06-23 @ 06:47) (122/64 - 147/101)  BP(mean): --  RR: 18 (04-06-23 @ 06:47) (16 - 18)  SpO2: 98% (04-06-23 @ 06:47) (98% - 100%)    PHYSICAL EXAM:    Constitutional: resting comfortably in bed; NAD  HEENT: NC/AT, EOMI, no nystagmus, anicteric sclera, no nasal discharge; MMM  Neck: supple; no JVD  Respiratory: unlabored breathing, CTA B/L; no W/Rhonchi/Crackles  Cardiac: +S1/S2; RRR; no M/R/G  Gastrointestinal: soft, non-distended. +suprapubic tenderness to palpation  : ny catheter in place  Extremities: WWP, no clubbing or cyanosis; no peripheral edema  Musculoskeletal: NROM x4; no joint swelling, tenderness or erythema  Neurologic: AAOx0-1 (self), looking around room and looks at author/telephone when  is speaking.

## 2023-04-06 NOTE — BH INPATIENT PSYCHIATRY DISCHARGE NOTE - NSBHMETABOLIC_PSY_ALL_CORE_FT
BMI: BMI (kg/m2): 26.4 (04-06-23 @ 14:02)  HbA1c:   Glucose: POCT Blood Glucose.: 101 mg/dL (04-06-23 @ 11:01)    BP: 142/82 (04-06-23 @ 10:07) (122/64 - 158/102)  Lipid Panel:

## 2023-04-06 NOTE — BH INPATIENT PSYCHIATRY DISCHARGE NOTE - HOSPITAL COURSE
Risperdal started during admission, but pt did not receive a dose [facility]. He was found unresponsive on the floor next ot his bed incontinent of urine. Rapid response called and pt transferred to ED and admitted to medicine for further work up.

## 2023-04-07 DIAGNOSIS — N39.0 URINARY TRACT INFECTION, SITE NOT SPECIFIED: ICD-10-CM

## 2023-04-07 LAB
ALBUMIN CSF-MCNC: 15.9 MG/DL — SIGNIFICANT CHANGE UP (ref 14–25)
ALBUMIN SERPL ELPH-MCNC: 4.1 G/DL — SIGNIFICANT CHANGE UP (ref 3.3–5)
ALBUMIN SERPL ELPH-MCNC: 4085 MG/DL — SIGNIFICANT CHANGE UP (ref 3500–5200)
ALP SERPL-CCNC: 51 U/L — SIGNIFICANT CHANGE UP (ref 40–120)
ALT FLD-CCNC: 13 U/L — SIGNIFICANT CHANGE UP (ref 10–45)
ANION GAP SERPL CALC-SCNC: 14 MMOL/L — SIGNIFICANT CHANGE UP (ref 5–17)
AST SERPL-CCNC: 12 U/L — SIGNIFICANT CHANGE UP (ref 10–40)
B BURGDOR C6 AB SER-ACNC: NEGATIVE — SIGNIFICANT CHANGE UP
B BURGDOR IGG+IGM SER-ACNC: 0.16 INDEX — SIGNIFICANT CHANGE UP (ref 0.01–0.89)
BASOPHILS # BLD AUTO: 0.04 K/UL — SIGNIFICANT CHANGE UP (ref 0–0.2)
BASOPHILS NFR BLD AUTO: 0.4 % — SIGNIFICANT CHANGE UP (ref 0–2)
BILIRUB SERPL-MCNC: 1.6 MG/DL — HIGH (ref 0.2–1.2)
BLD GP AB SCN SERPL QL: NEGATIVE — SIGNIFICANT CHANGE UP
BUN SERPL-MCNC: 13 MG/DL — SIGNIFICANT CHANGE UP (ref 7–23)
CALCIUM SERPL-MCNC: 9.4 MG/DL — SIGNIFICANT CHANGE UP (ref 8.4–10.5)
CHLORIDE SERPL-SCNC: 102 MMOL/L — SIGNIFICANT CHANGE UP (ref 96–108)
CK SERPL-CCNC: 35 U/L — SIGNIFICANT CHANGE UP (ref 30–200)
CO2 SERPL-SCNC: 23 MMOL/L — SIGNIFICANT CHANGE UP (ref 22–31)
CREAT SERPL-MCNC: 0.81 MG/DL — SIGNIFICANT CHANGE UP (ref 0.5–1.3)
CRYPTOC AG CSF-ACNC: NEGATIVE — SIGNIFICANT CHANGE UP
EGFR: 125 ML/MIN/1.73M2 — SIGNIFICANT CHANGE UP
EOSINOPHIL # BLD AUTO: 0.08 K/UL — SIGNIFICANT CHANGE UP (ref 0–0.5)
EOSINOPHIL NFR BLD AUTO: 0.8 % — SIGNIFICANT CHANGE UP (ref 0–6)
GLUCOSE BLDC GLUCOMTR-MCNC: 103 MG/DL — HIGH (ref 70–99)
GLUCOSE BLDC GLUCOMTR-MCNC: 92 MG/DL — SIGNIFICANT CHANGE UP (ref 70–99)
GLUCOSE SERPL-MCNC: 211 MG/DL — HIGH (ref 70–99)
HCT VFR BLD CALC: 42.8 % — SIGNIFICANT CHANGE UP (ref 39–50)
HGB BLD-MCNC: 15 G/DL — SIGNIFICANT CHANGE UP (ref 13–17)
IGG CSF-MCNC: 1.8 MG/DL — SIGNIFICANT CHANGE UP
IGG FLD-MCNC: 1086 MG/DL — SIGNIFICANT CHANGE UP (ref 610–1660)
IGG SYNTH RATE SER+CSF CALC-MRATE: -4.7 MG/DAY — SIGNIFICANT CHANGE UP
IGG/ALB CLEAR SER+CSF-RTO: 0.4 — SIGNIFICANT CHANGE UP
IGG/ALB CSF: 0.11 RATIO — SIGNIFICANT CHANGE UP
IGG/ALB SER: 0.27 RATIO — SIGNIFICANT CHANGE UP
IMM GRANULOCYTES NFR BLD AUTO: 0.4 % — SIGNIFICANT CHANGE UP (ref 0–0.9)
LABORATORY COMMENT REPORT: SIGNIFICANT CHANGE UP
LYMPHOCYTES # BLD AUTO: 1.81 K/UL — SIGNIFICANT CHANGE UP (ref 1–3.3)
LYMPHOCYTES # BLD AUTO: 18.8 % — SIGNIFICANT CHANGE UP (ref 13–44)
MAGNESIUM SERPL-MCNC: 1.7 MG/DL — SIGNIFICANT CHANGE UP (ref 1.6–2.6)
MCHC RBC-ENTMCNC: 31.2 PG — SIGNIFICANT CHANGE UP (ref 27–34)
MCHC RBC-ENTMCNC: 35 GM/DL — SIGNIFICANT CHANGE UP (ref 32–36)
MCV RBC AUTO: 89 FL — SIGNIFICANT CHANGE UP (ref 80–100)
MONOCYTES # BLD AUTO: 0.76 K/UL — SIGNIFICANT CHANGE UP (ref 0–0.9)
MONOCYTES NFR BLD AUTO: 7.9 % — SIGNIFICANT CHANGE UP (ref 2–14)
NEUTROPHILS # BLD AUTO: 6.91 K/UL — SIGNIFICANT CHANGE UP (ref 1.8–7.4)
NEUTROPHILS NFR BLD AUTO: 71.7 % — SIGNIFICANT CHANGE UP (ref 43–77)
NRBC # BLD: 0 /100 WBCS — SIGNIFICANT CHANGE UP (ref 0–0)
OSMOLALITY UR: 312 MOSM/KG — SIGNIFICANT CHANGE UP (ref 300–900)
PHOSPHATE SERPL-MCNC: 2.7 MG/DL — SIGNIFICANT CHANGE UP (ref 2.5–4.5)
PLATELET # BLD AUTO: 240 K/UL — SIGNIFICANT CHANGE UP (ref 150–400)
POTASSIUM SERPL-MCNC: 3.4 MMOL/L — LOW (ref 3.5–5.3)
POTASSIUM SERPL-SCNC: 3.4 MMOL/L — LOW (ref 3.5–5.3)
PROT SERPL-MCNC: 6.7 G/DL — SIGNIFICANT CHANGE UP (ref 6–8.3)
RBC # BLD: 4.81 M/UL — SIGNIFICANT CHANGE UP (ref 4.2–5.8)
RBC # FLD: 11.9 % — SIGNIFICANT CHANGE UP (ref 10.3–14.5)
RH IG SCN BLD-IMP: NEGATIVE — SIGNIFICANT CHANGE UP
SODIUM SERPL-SCNC: 139 MMOL/L — SIGNIFICANT CHANGE UP (ref 135–145)
SOURCE HSV 1/2: SIGNIFICANT CHANGE UP
T PALLIDUM AB TITR SER: NEGATIVE — SIGNIFICANT CHANGE UP
WBC # BLD: 9.64 K/UL — SIGNIFICANT CHANGE UP (ref 3.8–10.5)
WBC # FLD AUTO: 9.64 K/UL — SIGNIFICANT CHANGE UP (ref 3.8–10.5)

## 2023-04-07 PROCEDURE — 74018 RADEX ABDOMEN 1 VIEW: CPT | Mod: 26

## 2023-04-07 PROCEDURE — 99233 SBSQ HOSP IP/OBS HIGH 50: CPT | Mod: GC

## 2023-04-07 PROCEDURE — 99233 SBSQ HOSP IP/OBS HIGH 50: CPT

## 2023-04-07 PROCEDURE — 95720 EEG PHY/QHP EA INCR W/VEEG: CPT

## 2023-04-07 PROCEDURE — 99223 1ST HOSP IP/OBS HIGH 75: CPT

## 2023-04-07 RX ORDER — MAGNESIUM SULFATE 500 MG/ML
1 VIAL (ML) INJECTION ONCE
Refills: 0 | Status: COMPLETED | OUTPATIENT
Start: 2023-04-07 | End: 2023-04-07

## 2023-04-07 RX ORDER — POTASSIUM CHLORIDE 20 MEQ
20 PACKET (EA) ORAL
Refills: 0 | Status: COMPLETED | OUTPATIENT
Start: 2023-04-07 | End: 2023-04-07

## 2023-04-07 RX ORDER — POTASSIUM CHLORIDE 20 MEQ
20 PACKET (EA) ORAL ONCE
Refills: 0 | Status: DISCONTINUED | OUTPATIENT
Start: 2023-04-07 | End: 2023-04-07

## 2023-04-07 RX ORDER — CEFTRIAXONE 500 MG/1
1000 INJECTION, POWDER, FOR SOLUTION INTRAMUSCULAR; INTRAVENOUS ONCE
Refills: 0 | Status: COMPLETED | OUTPATIENT
Start: 2023-04-08 | End: 2023-04-08

## 2023-04-07 RX ADMIN — Medication 100 GRAM(S): at 10:42

## 2023-04-07 RX ADMIN — Medication 50 MILLIEQUIVALENT(S): at 21:31

## 2023-04-07 RX ADMIN — Medication 105 MILLIGRAM(S): at 12:54

## 2023-04-07 RX ADMIN — CEFTRIAXONE 100 MILLIGRAM(S): 500 INJECTION, POWDER, FOR SOLUTION INTRAMUSCULAR; INTRAVENOUS at 05:02

## 2023-04-07 RX ADMIN — Medication 105 MILLIGRAM(S): at 20:00

## 2023-04-07 RX ADMIN — Medication 50 MILLIEQUIVALENT(S): at 16:53

## 2023-04-07 RX ADMIN — Medication 2 MILLIGRAM(S): at 22:06

## 2023-04-07 RX ADMIN — Medication 300 MILLIGRAM(S): at 05:30

## 2023-04-07 RX ADMIN — Medication 105 MILLIGRAM(S): at 02:10

## 2023-04-07 RX ADMIN — Medication 50 MILLIEQUIVALENT(S): at 18:57

## 2023-04-07 RX ADMIN — ENOXAPARIN SODIUM 40 MILLIGRAM(S): 100 INJECTION SUBCUTANEOUS at 18:38

## 2023-04-07 RX ADMIN — Medication 1 MILLIGRAM(S): at 10:42

## 2023-04-07 RX ADMIN — Medication 266 MILLIGRAM(S): at 05:18

## 2023-04-07 RX ADMIN — Medication 2 MILLIGRAM(S): at 13:59

## 2023-04-07 NOTE — BH CONSULTATION LIAISON ASSESSMENT NOTE - HPI (INCLUDE ILLNESS QUALITY, SEVERITY, DURATION, TIMING, CONTEXT, MODIFYING FACTORS, ASSOCIATED SIGNS AND SYMPTOMS)
Patient assessed using  Caro [id number]; he was lying in bed, lethargic and mute but would occasionally make eye contact and then shifts his attentions away. He does not respond when asked about his name, where he is, or other orientation questions. He will make an odd grimace at moments as if in pain or appear like he is trying to get up from the bed but then stop like he is stuck. He is unable to respond to review of system question regarding mood, suicidality, homicidality or paranoid ideation or hallucinations. On physical exam, he has increased rigidity and posturing with decreased blinking observed.  Family was at bedside and most collateral gathered from brother Garcia; per him patient reported that he was "being followed and afraid" only one day before the admission to [facility] which was on 04/05/23. Brother went on to state, "he would often say I am depressed like all the time or I can't go on living like this" and "would stay by himself all the time" but denied any changes in grooming or eating. Patient would also often go to his other brother's house (Patel also present at bedside) because, "He couldn't sleep at home". Per the brothers, patient and father often get into arguments as the father drinks a lot and "was causing him a lot of stress". Additionally, they reported that patient had been complaining of constant headaches for the last 1 year and he was, "always taking tyelnol, whenever we saw him". There is also concern about the state of relationship the patient has with his wife back in [facility] but brother reported, "he was hiding things from us, always so secretive but he never said he was afraid of being followed before the day he had to come to the hospital"

## 2023-04-07 NOTE — BH CONSULTATION LIAISON ASSESSMENT NOTE - DETAILS
See above Patient admitted to [address] psychiatric inpatient unit for 1 day: Risperdal started during admission, but pt did not receive a dose while admitted to [facility]. He was found unresponsive on the floor next ot his bed incontinent of urine. Rapid response called and pt transferred to ED and admitted to medicine for further work up.

## 2023-04-07 NOTE — PROGRESS NOTE ADULT - ASSESSMENT
This is a 27 year-old male with no significant past medical history who was brought to ED from [facility] as a rapid response after being found on the floor of his room unresponsive, having urinated on himself. Unclear etiology for acute cognitive and behavioral changes given no personal hx of neurological or psychiatric illnesses, and hereby further diagnostic imagings are warranted. MRI Brain was unremarkable, VEEG showed no epileptiform activity or significant clinical events. He was started on empiric meningitis coverage and IV thiamine supplementation. He had a catatonic presentation on examination, would recommend benzodiazepine trial at this time.     Plan:  - MRI Brain w/ w/o reviewed, showed no acute abnormalities  - VEEG reviewed, showed no epileptiform activity or significant clinical events  - LP completed, CSF studies unremarkable thus far  - F/u remaining CSF studies  - Recommend Psych eval, appreciate recs  - Recommend benzodiazepine trial for suspected catatonia  - Please call if there are any changes to patient's clinical or neurological status  - Neurology will continue to follow  - Continue rest of care as per primary team   This is a 27 year-old male with no significant past medical history who was brought to ED from [facility] as a rapid response after being found on the floor of his room unresponsive, having urinated on himself. Unclear etiology for acute cognitive and behavioral changes given no personal hx of neurological or psychiatric illnesses, and hereby further diagnostic imagings are warranted. MRI Brain was unremarkable, VEEG showed no epileptiform activity or significant clinical events. He was started on empiric meningitis coverage and IV thiamine supplementation initially. Earlier, he had a catatonic presentation on examination, would recommend benzodiazepine trial at this time.     Plan:  - MRI Brain w/ w/o reviewed, showed no acute abnormalities  - VEEG reviewed, showed no epileptiform activity or significant clinical events  - LP completed, CSF studies unremarkable thus far  - F/u remaining CSF studies  - Recommend Psych eval, appreciate recs  - Recommend benzodiazepine trial for suspected catatonia  - Please call if there are any changes to patient's clinical or neurological status  - Neurology will continue to follow  - Continue rest of care as per primary team   This is a 27 year-old male with no significant past medical history who was brought to ED from [facility] as a rapid response after being found on the floor of his room unresponsive, having urinated on himself. Unclear etiology for acute cognitive and behavioral changes given no personal hx of neurological or psychiatric illnesses, and hereby further diagnostic imagings are warranted. MRI Brain was unremarkable, VEEG showed no epileptiform activity or significant clinical events. He was started on empiric meningitis coverage and IV thiamine supplementation initially. Currently he has signs of catatonia on examination, would recommend benzodiazepine trial at this time.     Plan:  - MRI Brain w/ w/o reviewed, showed no acute abnormalities  - VEEG reviewed, showed no epileptiform activity or significant clinical events  - LP completed, CSF studies unremarkable thus far  - F/u remaining CSF studies  - Recommend Psych eval, appreciate recs  - Recommend benzodiazepine trial for suspected catatonia  - Please call if there are any changes to patient's clinical or neurological status  - Neurology will continue to follow  - Continue rest of care as per primary team

## 2023-04-07 NOTE — BH CONSULTATION LIAISON ASSESSMENT NOTE - DIFFERENTIAL
Catatonia due to general medical illness  r/o MDD with psychosis  Brief psychotic disorder vs Schizophreniform disorder

## 2023-04-07 NOTE — PROGRESS NOTE ADULT - ASSESSMENT
27 year-old male with no significant past medical history brought to ED from [facility] as a rapid response after being found on the floor of his room unresponsive, having urinated on himself. Unclear etiology for acute cognitive and behavioral changes given no personal hx of neurological or psychiatric illnesses, and hereby further diagnostic imagings are warranted. Differential diagnosis include CNS infectious or inflammatory process vs primary psychiatric disorder.     Recommendations  - C/w vEEG monitoring   - Please obtain LP  - Please MRI brain w/wo contrast normal    - will continue to follow      Pending discussion with attending  27 year-old male with no significant past medical history brought to ED from [facility] as a rapid response after being found on the floor of his room unresponsive, having urinated on himself. Unclear etiology for acute cognitive and behavioral changes given no personal hx of neurological or psychiatric illnesses, and hereby further diagnostic imagings are warranted. Differential diagnosis include CNS infectious or inflammatory process vs primary psychiatric disorder.     Recommendations  - C/w vEEG monitoring   - Please MRI brain w/wo contrast normal    - will continue to follow      Pending discussion with attending

## 2023-04-07 NOTE — EEG REPORT - NS EEG TEXT BOX
[facility] Department of Neurology  Inpatient Continuous video-Electroencephalogram    Patient Name:	BROOKE SANCHEZ    :	-  MRN:	-    Study Start Date/Time:  2023, 6:53:35 PM  Study End Date/Time: ongoing    Referred by: Carmen Arreguin MD    Brief Clinical History:  BROOKE SANCHEZ is a - old -; study performed to investigate for seizures or markers of epilepsy.  Admitted for acute psychosis and cognitive changes    Diagnosis Code:   R56.9 convulsions/seizure  The live video was: unmonitored.    Pertinent Medications:  none    Acquisition Details:  Electroencephalography was acquired using a minimum of 21 channels on an [employer] system v 9.3.1 with electrode placement according to the standard International 10-20 system following ACNS (American Clinical Neurophysiology Society) guidelines for Long-Term Video EEG monitoring.  Anterior temporal T1 and T2 electrodes were utilized whenever possible.   The [employer] automated spike & seizure detections were all reviewed in detail, in addition to extensive portions of raw EEG.      Day 1: 2023 @ 6:53:35 PM to next morning @ 07:00 am  Background:  continuous, with predominantly alpha and beta frequencies.  Symmetry:  No persistent asymmetries of voltage or frequency.  Posterior Dominant Rhythm:  10 Hz symmetric, well-organized, and well-modulated.  Organization: Appropriate anterior-posterior gradient.  Voltage:  Normal (20+ uV)  Variability: Yes. 		Reactivity: Yes.  N2 sleep: Symmetric, synchronous spindles and K complexes.  Spontaneous Activity:  No epileptiform discharges.  Periodic/rhythmic activity:  None  Events:  No electrographic seizures or significant clinical events.  Provocations:  Hyperventilation and Photic stimulation: was not performed.    Daily Summary:    No epileptiform activity and no significant clinical events occurred.      Carmen Arreguin MD  Attending Neurologist, [facility] Epilepsy Program

## 2023-04-07 NOTE — PROGRESS NOTE ADULT - PROBLEM SELECTOR PLAN 3
Patient with bacteria on Urinalysis with urinary retention, unable to assess for other symptoms. Friend catheter placed.   - Plan for 3 days fo Ctx 1 G IV. Patient with bacteria on Urinalysis with urinary retention, unable to assess for other symptoms. Friend catheter placed.   - Plan for 3 days of Ctx 1 G IV (4/7 - 4/9).

## 2023-04-07 NOTE — PROGRESS NOTE ADULT - PROBLEM SELECTOR PLAN 1
No personal or family hx of psych/neuro illnesses. Pt presented to ED 4/4 w/ delusions/paranoia x 3 days, involuntarily admitted to inpatient psychiatry for psychosis. 4/6 AM pt had an unwitnessed fall, urinary incontinence, and AMS, rapid response called and pt was brought to ED. Labs s/f new leukocytosis (9.05 --> 14.48), pt has tachycardia however remains afebrile. Lactate wnl, CK wnl. CTH negative. CTAP w/ markedly distended bladder w/ adjacent fat stranding & trace fluid, mild R hydroureteronephrosis. Suprapubic tenderness on exam, however UA +ketones otherwise negative. Friend catheter placed in ED. Patient underwent MRI w/wo contrast without acute findings. LP performed, no significant results noted. Unclear etiology of AMS, ddx includes CNS infectious or inflammatory process vs primary psychiatric disorder.    Plan:   - Continue Folate 1 mg IV qd and Thiamine 500 mg IV q8h x 3 days  - Plan to stop Acyclovir, Ctx 2g, and Vancomycin given negative LP findings.   - NPO, patient would not tolerate S&S at this time.   - f/u ESR, CRP, vitamin B12, folate, HIV, syphilis, TSH, isopropanol, methanol, salicylate level, serum osm, lyme IgG/IgM  - vEEG monitoring - Negative for acute findings.   - neuro consulted, f/u recs  - epilepsy consulted, f/u recs  - psych consulted, f/u recs No personal or family hx of psych/neuro illnesses. Pt presented to ED 4/4 w/ delusions/paranoia x 3 days, involuntarily admitted to inpatient psychiatry for psychosis. 4/6 AM pt had an unwitnessed fall, urinary incontinence, and AMS, rapid response called and pt was brought to ED. Labs s/f new leukocytosis (9.05 --> 14.48), pt has tachycardia however remains afebrile. Lactate wnl, CK wnl. CTH negative. CTAP w/ markedly distended bladder w/ adjacent fat stranding & trace fluid, mild R hydroureteronephrosis. Suprapubic tenderness on exam, however UA +ketones otherwise negative. Friend catheter placed in ED. Patient underwent MRI w/wo contrast without acute findings. LP performed, no significant results noted. ESR, CRP negative. Unclear etiology of AMS, ddx includes CNS infectious or inflammatory process vs primary psychiatric disorder.    Plan:   - Continue Folate 1 mg IV qd and Thiamine 500 mg IV q8h x 3 days  - Discontinuing Acyclovir, Ctx 2g, and Vancomycin after one day given negative LP findings.   - NPO, patient would not tolerate S&S at this time.   - F/u vitamin B12, folate, HIV, syphilis, TSH, isopropanol, methanol, salicylate level, lyme IgG/IgM.  - vEEG monitoring - Negative for acute findings.   - neuro consulted, f/u recs  - epilepsy consulted, f/u recs  - psych consulted, f/u recs

## 2023-04-07 NOTE — PROGRESS NOTE ADULT - SUBJECTIVE AND OBJECTIVE BOX
BROOKE SANCHEZ  27y  Male    Patient is a 27y old  Male who presents with a chief complaint of     INTERVAL HPI/OVERNIGHT EVENTS: No events overnight.       T(C): 37.2 (23 @ 06:28), Max: 37.8 (23 @ 11:00)  HR: 84 (23 @ 04:09) (70 - 96)  BP: 137/76 (23 @ 04:09) (124/66 - 145/93)  RR: 16 (23 @ 04:09) (14 - 17)  SpO2: 96% (23 @ 04:09) (96% - 100%)  Wt(kg): --Vital Signs Last 24 Hrs  T(C): 37.2 (2023 06:28), Max: 37.8 (2023 11:00)  T(F): 98.9 (2023 06:28), Max: 100.1 (2023 11:00)  HR: 84 (2023 04:09) (70 - 96)  BP: 137/76 (2023 04:09) (124/66 - 145/93)  BP(mean): 98 (2023 04:09) (87 - 112)  RR: 16 (2023 04:09) (14 - 17)  SpO2: 96% (2023 04:09) (96% - 100%)    Parameters below as of 2023 04:09  Patient On (Oxygen Delivery Method): room air        PHYSICAL EXAM:  Neurologic:     -Mental Status: AAOx0. Minimally responsive to commands     -Cranial Nerves:          II: Pupils equal and reactive to light          unable to assess other CNs     -Motor: Normal bulk and tone. Improving rigidity      -Sensation: unable to assess      Consultant(s) Notes Reviewed:  [x ] YES  [ ] NO  Care Discussed with Consultants/Other Providers [ x] YES  [ ] NO    LABS:                        15.0   9.64  )-----------( 240      ( 2023 05:30 )             42.8         139  |  102  |  13  ----------------------------<  211<H>  3.4<L>   |  23  |  0.81    Ca    9.4      2023 05:30  Phos  2.7     04-07  Mg     1.7     04-07    TPro  6.7  /  Alb  4.1  /  TBili  1.6<H>  /  DBili  x   /  AST  12  /  ALT  13  /  AlkPhos  51  04-07      PT/INR - ( 2023 03:48 )   PT: 15.5 sec;   INR: 1.30          PTT - ( 2023 03:48 )  PTT:28.6 sec  Urinalysis Basic - ( 2023 11:18 )    Color: Yellow / Appearance: Clear / S.025 / pH: x  Gluc: x / Ketone: >=80 mg/dL  / Bili: Negative / Urobili: 1.0 E.U./dL   Blood: x / Protein: Trace mg/dL / Nitrite: NEGATIVE   Leuk Esterase: NEGATIVE / RBC: < 5 /HPF / WBC < 5 /HPF   Sq Epi: x / Non Sq Epi: x / Bacteria: Present /HPF      CAPILLARY BLOOD GLUCOSE      POCT Blood Glucose.: 101 mg/dL (2023 11:01)      ABG - ( 2023 11:27 )  pH, Arterial: 7.41  pH, Blood: x     /  pCO2: 34    /  pO2: 142   / HCO3: 22    / Base Excess: -2.4  /  SaO2: 98.8              Urinalysis Basic - ( 2023 11:18 )    Color: Yellow / Appearance: Clear / S.025 / pH: x  Gluc: x / Ketone: >=80 mg/dL  / Bili: Negative / Urobili: 1.0 E.U./dL   Blood: x / Protein: Trace mg/dL / Nitrite: NEGATIVE   Leuk Esterase: NEGATIVE / RBC: < 5 /HPF / WBC < 5 /HPF   Sq Epi: x / Non Sq Epi: x / Bacteria: Present /HPF        RADIOLOGY & ADDITIONAL TESTS:    Imaging Personally Reviewed:  [ ] YES  [ ] NO    HEALTH ISSUES - PROBLEM Dx:  Encephalopathy    Prophylactic measure    SIRS (systemic inflammatory response syndrome)    Urinary retention         BROOKE SANCHEZ  27y  Male    Patient is a 27y old  Male who presents with a chief complaint of     INTERVAL HPI/OVERNIGHT EVENTS: No events overnight.       T(C): 37.2 (23 @ 06:28), Max: 37.8 (23 @ 11:00)  HR: 84 (23 @ 04:09) (70 - 96)  BP: 137/76 (23 @ 04:09) (124/66 - 145/93)  RR: 16 (23 @ 04:09) (14 - 17)  SpO2: 96% (23 @ 04:09) (96% - 100%)  Wt(kg): --Vital Signs Last 24 Hrs  T(C): 37.2 (2023 06:28), Max: 37.8 (2023 11:00)  T(F): 98.9 (2023 06:28), Max: 100.1 (2023 11:00)  HR: 84 (2023 04:09) (70 - 96)  BP: 137/76 (2023 04:09) (124/66 - 145/93)  BP(mean): 98 (2023 04:09) (87 - 112)  RR: 16 (2023 04:09) (14 - 17)  SpO2: 96% (2023 04:09) (96% - 100%)    Parameters below as of 2023 04:09  Patient On (Oxygen Delivery Method): room air        PHYSICAL EXAM:  Neurologic:     -Mental Status: AAOx0. Minimally responsive to commands     -Cranial Nerves:          III: Pupils equal and reactive to light          unable to assess other CNs     -Motor: Normal bulk and tone. Improving rigidity      -Sensation: unable to assess      Consultant(s) Notes Reviewed:  [x ] YES  [ ] NO  Care Discussed with Consultants/Other Providers [ x] YES  [ ] NO    LABS:                        15.0   9.64  )-----------( 240      ( 2023 05:30 )             42.8         139  |  102  |  13  ----------------------------<  211<H>  3.4<L>   |  23  |  0.81    Ca    9.4      2023 05:30  Phos  2.7     04-07  Mg     1.7     04-07    TPro  6.7  /  Alb  4.1  /  TBili  1.6<H>  /  DBili  x   /  AST  12  /  ALT  13  /  AlkPhos  51  04-07      PT/INR - ( 2023 03:48 )   PT: 15.5 sec;   INR: 1.30          PTT - ( 2023 03:48 )  PTT:28.6 sec  Urinalysis Basic - ( 2023 11:18 )    Color: Yellow / Appearance: Clear / S.025 / pH: x  Gluc: x / Ketone: >=80 mg/dL  / Bili: Negative / Urobili: 1.0 E.U./dL   Blood: x / Protein: Trace mg/dL / Nitrite: NEGATIVE   Leuk Esterase: NEGATIVE / RBC: < 5 /HPF / WBC < 5 /HPF   Sq Epi: x / Non Sq Epi: x / Bacteria: Present /HPF      CAPILLARY BLOOD GLUCOSE      POCT Blood Glucose.: 101 mg/dL (2023 11:01)      ABG - ( 2023 11:27 )  pH, Arterial: 7.41  pH, Blood: x     /  pCO2: 34    /  pO2: 142   / HCO3: 22    / Base Excess: -2.4  /  SaO2: 98.8              Urinalysis Basic - ( 2023 11:18 )    Color: Yellow / Appearance: Clear / S.025 / pH: x  Gluc: x / Ketone: >=80 mg/dL  / Bili: Negative / Urobili: 1.0 E.U./dL   Blood: x / Protein: Trace mg/dL / Nitrite: NEGATIVE   Leuk Esterase: NEGATIVE / RBC: < 5 /HPF / WBC < 5 /HPF   Sq Epi: x / Non Sq Epi: x / Bacteria: Present /HPF        RADIOLOGY & ADDITIONAL TESTS:    Imaging Personally Reviewed:  [ ] YES  [ ] NO    HEALTH ISSUES - PROBLEM Dx:  Encephalopathy    Prophylactic measure    SIRS (systemic inflammatory response syndrome)    Urinary retention

## 2023-04-07 NOTE — PROGRESS NOTE ADULT - PROBLEM SELECTOR PLAN 2
Pt met 2/4 SIRS criteria in ED (leukocytosis, tachycardia). BP 140s/70s-100s. Low suspicion for infection at this time.  - hold off on abx at this time  - Leukocytosis improved. Pt met 2/4 SIRS criteria in ED (leukocytosis, tachycardia). BP 140s/70s-100s. Low suspicion for infection at this time.  - Leukocytosis improved with empiric antibiotics as above.

## 2023-04-07 NOTE — BH CONSULTATION LIAISON ASSESSMENT NOTE - RISK ASSESSMENT
27 year old Marshallese speaking man, domiciled with family,  (wife in [place]), employed with no formal past psychiatric or medical history consulted for management of Catatonia. On assessment, patient scored 24 on the Hernandez Mohsen Catatonia scale with higher score indicating high severity. He displays classical signs of catatonia such as increased rigidity, withdrawal, grimacing, stereotypy, ambitendency and negativisim. At this time, the most likely cause is an underlying organic illness especially given his presentation on inpatient psychiatry unit was indicative of seizure but based on collateral, will consider MDD with psychosis vs primary psychotic disorder in the differential as well.  27 year old Jamaican speaking man, domiciled with family,  (wife in [place]), employed with no formal past psychiatric or medical history consulted for management of Catatonia. On assessment, patient scored 24 on the Hernandez Mohsen Catatonia scale with higher score indicating high severity. He displays classical signs of catatonia such as increased rigidity, withdrawal, grimacing, stereotypy, ambitendency and negativisim. At this time, the most likely cause is an underlying organic illness especially given his presentation on inpatient psychiatry unit was indicative of seizure but based on collateral, will consider MDD with psychosis vs primary psychotic disorder in the differential as well.

## 2023-04-07 NOTE — BH CONSULTATION LIAISON ASSESSMENT NOTE - NSBHATTESTCOMMENTATTENDFT_PSY_A_CORE
27 year old Nicaraguan speaking man, domiciled with family,  (wife in [place]), employed with no formal past psychiatric or medical history consulted for management of likely catatonia. On assessment, patient scored 24 on the Hernandez Mohsen Catatonia scale with higher score indicating high severity. He displays classical signs of catatonia such as increased rigidity, withdrawal, grimacing, stereotypy, ambitendency and negativisim. Pt received Ativan 2 mg with significant improvement.   Continue CO  Ativan 2 mg po/IV tid as per resident's note  Follow up on EEG findings  Psychiatry will continue to follow to assess progress and determine further disposition.

## 2023-04-07 NOTE — BH CONSULTATION LIAISON ASSESSMENT NOTE - NSBHCHARTREVIEWVS_PSY_A_CORE FT
Vital Signs Last 24 Hrs  T(C): 37.7 (07 Apr 2023 10:00), Max: 37.7 (07 Apr 2023 10:00)  T(F): 99.9 (07 Apr 2023 10:00), Max: 99.9 (07 Apr 2023 10:00)  HR: 88 (07 Apr 2023 11:50) (70 - 96)  BP: 144/85 (07 Apr 2023 11:50) (124/66 - 145/93)  BP(mean): 108 (07 Apr 2023 11:50) (87 - 112)  RR: 16 (07 Apr 2023 11:50) (15 - 17)  SpO2: 96% (07 Apr 2023 11:50) (96% - 100%)    Parameters below as of 07 Apr 2023 11:50  Patient On (Oxygen Delivery Method): room air

## 2023-04-07 NOTE — BH CONSULTATION LIAISON ASSESSMENT NOTE - CURRENT MEDICATION
MEDICATIONS  (STANDING):  enoxaparin Injectable 40 milliGRAM(s) SubCutaneous every 24 hours  folic acid Injectable 1 milliGRAM(s) IV Push every 24 hours  LORazepam   Injectable 2 milliGRAM(s) IV Push once  potassium chloride  20 mEq/100 mL IVPB 20 milliEquivalent(s) IV Intermittent once  thiamine IVPB 500 milliGRAM(s) IV Intermittent every 8 hours    MEDICATIONS  (PRN):

## 2023-04-07 NOTE — BH CONSULTATION LIAISON ASSESSMENT NOTE - SUMMARY
Patient assessed and colleratal gathered from family at bedside, recommendations as below:  1. Standing medications: Start with Ativan 2 mg Intravenous, if patient responds (50% decrease in Hernandez Mohsen scale) continue with standing 2 mg Ativan three times a day intravenously. Patient can be converted to intramuscular and the oral if patient able to tolerate oral intake and still displaying signs of Catatonia  2. Avoid use of antipsychotics as they may worse the catatonia  3.Psychiatry to follow over the weekend  4. Other management as per primary team

## 2023-04-07 NOTE — PROGRESS NOTE ADULT - ASSESSMENT
This 27 year-old M w/ no significant PMH, presented to ED 4/4 w/ delusions/paranoia x 3 days, involuntarily admitted to inpatient psychiatry for psychosis. 4/6 AM pt had an unwitnessed fall, urinary incontinence, and AMS, rapid response called and pt was brought to ED. Admitted to [facility] for encephalopathy workup. This 27 year-old M w/ no significant PMH, presented to ED 4/4 w/ delusions/paranoia x 3 days, involuntarily admitted to inpatient psychiatry for psychosis. 4/6 AM pt had an unwitnessed fall, urinary incontinence, and AMS, rapid response called and pt was brought to ED. Admitted to [facility] for encephalopathy workup, stepped up to telemetry after rapid response and acute change in mentation in ED. Patient with waxing/waning mentation over the course of telemetry stay, neuro reports EEG consistent with normal awake mental activity. LP with negative infectious workup, autoimmune workup pending. Psychiatry reports physical exam and Hernandez Mohsen consistent with catatonia, will re-evaluate.

## 2023-04-07 NOTE — PROGRESS NOTE ADULT - PROBLEM SELECTOR PLAN 4
CTAP w/ markedly distended bladder w/ adjacent fat stranding & trace fluid, mild R hydroureteronephrosis. Suprapubic tenderness on exam, however UA +ketones otherwise negative. Ny catheter placed in ED.  - d/c ny, start TOV  - bladder scans q6h  - straight cath for PVR >350 cc CTAP w/ markedly distended bladder w/ adjacent fat stranding & trace fluid, mild R hydroureteronephrosis. Suprapubic tenderness on exam, however UA +ketones otherwise negative. Friend catheter placed in ED.  - Continue with Friend catheter given previous urinary retention and AMS.  - Follow up Is&Os.

## 2023-04-07 NOTE — PROGRESS NOTE ADULT - SUBJECTIVE AND OBJECTIVE BOX
INTERVAL HPI/OVERNIGHT EVENTS:  Patient was seen and examined at bedside. Case discussed with attending physician during morning rounds.     As per nurse and family at bedside no overnight events. Patient noted to follow commands given by brother in [place] Patient responds "pain" at one time to questioning.     VITAL SIGNS:  T(F): 99.9 (23 @ 10:00)  HR: 88 (23 @ 11:50)  BP: 144/85 (23 @ 11:50)  RR: 16 (23 @ 11:50)  SpO2: 96% (23 @ 11:50)  Wt(kg): --    PHYSICAL EXAM:  Constitutional: Thin, young male in no acute distress, patient is lying on bed without motion and with eyes half open.   HEENT: Sclera non-icteric, PERRL with micro-eye movements not nystagmus per Neuro, on retraction of eyelids patient will roll superior, neck supple, trachea midline, patient with tooth discoloration on all teeth,   Respiratory: Clear to auscultation bilaterally, does not take deep breaths on command.   Cardiovascular: Regular rate and rhythm, normal S1S2.  Gastrointestinal: soft, non-tender and non-distended, Normoactive bowel sounds.  Extremities: Warm, well perfused, pulses equal bilateral upper and lower extremities, no edema.   Back: no cervical spine step-offs, no thoracic/l spine step-offs noted.   Neurological: Intermittent verbalizations as above but otherwise appears catatonic, (+) gag reflex, (+) corneal reflex, on elevation of hand above face patient will hold arm at length, normal muscle tone in upper extremities but unable to assess strength, patient will slowly allow arm to fall to the side, lower extremities will fall to the bed. (-) kerning.   Skin: Normal temperature, cool extremities but noted to be on return from MRI.     MEDICATIONS  (STANDING):  enoxaparin Injectable 40 milliGRAM(s) SubCutaneous every 24 hours  folic acid Injectable 1 milliGRAM(s) IV Push every 24 hours  potassium chloride  20 mEq/100 mL IVPB 20 milliEquivalent(s) IV Intermittent once  thiamine IVPB 500 milliGRAM(s) IV Intermittent every 8 hours    MEDICATIONS  (PRN):      Allergies    No Known Allergies    Intolerances        LABS:                        15.0   9.64  )-----------( 240      ( 2023 05:30 )             42.8     04-07    139  |  102  |  13  ----------------------------<  211<H>  3.4<L>   |  23  |  0.81    Ca    9.4      2023 05:30  Phos  2.7     04-07  Mg     1.7     04-07    TPro  6.7  /  Alb  4.1  /  TBili  1.6<H>  /  DBili  x   /  AST  12  /  ALT  13  /  AlkPhos  51  04-07    PT/INR - ( 2023 03:48 )   PT: 15.5 sec;   INR: 1.30          PTT - ( 2023 03:48 )  PTT:28.6 sec  Urinalysis Basic - ( 2023 11:18 )    Color: Yellow / Appearance: Clear / S.025 / pH: x  Gluc: x / Ketone: >=80 mg/dL  / Bili: Negaztive / Urobili: 1.0 E.U./dL   Blood: x / Protein: Trace mg/dL / Nitrite: NEGATIVE   Leuk Esterase: NEGATIVE / RBC: < 5 /HPF / WBC < 5 /HPF   Sq Epi: x / Non Sq Epi: x / Bacteria: Present /HPF          RADIOLOGY & ADDITIONAL TESTS:  Reviewed INTERVAL HPI/OVERNIGHT EVENTS:  Patient was seen and examined at bedside. Case discussed with attending physician during morning rounds.     As per nurse and family at bedside no overnight events. Patient noted to follow commands given by brother in [place] Patient responds "pain" at one time to questioning.     VITAL SIGNS:  T(F): 99.9 (23 @ 10:00)  HR: 88 (23 @ 11:50)  BP: 144/85 (23 @ 11:50)  RR: 16 (23 @ 11:50)  SpO2: 96% (23 @ 11:50)  Wt(kg): --    PHYSICAL EXAM:  Constitutional: Thin, young male in no acute distress, patient is lying on bed without motion and with eyes half open.   HEENT: Sclera non-icteric, PERRL, on retraction of eyelids patient will roll superior, neck supple.  Respiratory: Clear to auscultation bilaterally, does not take deep breaths on command.   Cardiovascular: Regular rate and rhythm, normal S1S2.  Gastrointestinal: soft, non-tender and non-distended, Normoactive bowel sounds.  Extremities: Warm, well perfused, pulses equal bilateral upper and lower extremities, no edema.   Neurological: Intermittent verbalizations, now following intermittent commands by brother.   Skin: Normal temperature, cool extremities but noted to be on return from MRI.     MEDICATIONS  (STANDING):  enoxaparin Injectable 40 milliGRAM(s) SubCutaneous every 24 hours  folic acid Injectable 1 milliGRAM(s) IV Push every 24 hours  potassium chloride  20 mEq/100 mL IVPB 20 milliEquivalent(s) IV Intermittent once  thiamine IVPB 500 milliGRAM(s) IV Intermittent every 8 hours    MEDICATIONS  (PRN):      Allergies    No Known Allergies    Intolerances        LABS:                        15.0   9.64  )-----------( 240      ( 2023 05:30 )             42.8     -    139  |  102  |  13  ----------------------------<  211<H>  3.4<L>   |  23  |  0.81    Ca    9.4      2023 05:30  Phos  2.7     -  Mg     1.7     -    TPro  6.7  /  Alb  4.1  /  TBili  1.6<H>  /  DBili  x   /  AST  12  /  ALT  13  /  AlkPhos  51  04-07    PT/INR - ( 2023 03:48 )   PT: 15.5 sec;   INR: 1.30          PTT - ( 2023 03:48 )  PTT:28.6 sec  Urinalysis Basic - ( 2023 11:18 )    Color: Yellow / Appearance: Clear / S.025 / pH: x  Gluc: x / Ketone: >=80 mg/dL  / Bili: Negaztive / Urobili: 1.0 E.U./dL   Blood: x / Protein: Trace mg/dL / Nitrite: NEGATIVE   Leuk Esterase: NEGATIVE / RBC: < 5 /HPF / WBC < 5 /HPF   Sq Epi: x / Non Sq Epi: x / Bacteria: Present /HPF          RADIOLOGY & ADDITIONAL TESTS:  Reviewed

## 2023-04-07 NOTE — PROGRESS NOTE ADULT - SUBJECTIVE AND OBJECTIVE BOX
Neurology Progress Note    Interval History:    There were no acute events overnight. Patient not talking during encounter at this time.       PAST MEDICAL & SURGICAL HISTORY:  No pertinent past medical history  No significant past surgical history      Medications:  enoxaparin Injectable 40 milliGRAM(s) SubCutaneous every 24 hours  folic acid Injectable 1 milliGRAM(s) IV Push every 24 hours  LORazepam   Injectable 2 milliGRAM(s) IV Push every 8 hours  potassium chloride  20 mEq/100 mL IVPB 20 milliEquivalent(s) IV Intermittent every 2 hours  thiamine IVPB 500 milliGRAM(s) IV Intermittent every 8 hours      Vital Signs Last 24 Hrs  T(C): 36.9 (2023 17:15), Max: 37.7 (2023 10:00)  T(F): 98.4 (2023 17:15), Max: 99.9 (2023 10:00)  HR: 88 (2023 11:50) (84 - 96)  BP: 144/85 (2023 11:50) (129/77 - 145/93)  BP(mean): 108 (2023 11:50) (96 - 112)  RR: 16 (2023 11:50) (15 - 16)  SpO2: 96% (2023 11:50) (96% - 98%)    Parameters below as of 2023 11:50  Patient On (Oxygen Delivery Method): room air      Neurological Exam:   Mental status: Awake, not alert. Eyes open with volitional eye movements, not talking during encounter. Not following commands.  Cranial nerves: Pupils equally round and reactive to light, face symmetric, tongue was midline.   Motor: Lowers upper extremities slowly when holding above face. Normal tone and bulk. No abnormal movements.    Sensation: Withdraws to pain in upper and lower extremities.  Coordination: Unable to assess.  Reflexes: 2+ in bilateral UE/LE, downgoing toes bilaterally.  Gait: Deferred.      Labs:  CBC Full  -  ( 2023 05:30 )  WBC Count : 9.64 K/uL  RBC Count : 4.81 M/uL  Hemoglobin : 15.0 g/dL  Hematocrit : 42.8 %  Platelet Count - Automated : 240 K/uL  Mean Cell Volume : 89.0 fl  Mean Cell Hemoglobin : 31.2 pg  Mean Cell Hemoglobin Concentration : 35.0 gm/dL  Auto Neutrophil # : 6.91 K/uL  Auto Lymphocyte # : 1.81 K/uL  Auto Monocyte # : 0.76 K/uL  Auto Eosinophil # : 0.08 K/uL  Auto Basophil # : 0.04 K/uL  Auto Neutrophil % : 71.7 %  Auto Lymphocyte % : 18.8 %  Auto Monocyte % : 7.9 %  Auto Eosinophil % : 0.8 %  Auto Basophil % : 0.4 %        139  |  102  |  13  ----------------------------<  211<H>  3.4<L>   |  23  |  0.81    Ca    9.4      2023 05:30  Phos  2.7     04-  Mg     1.7     -    TPro  6.7  /  Alb  4.1  /  TBili  1.6<H>  /  DBili  x   /  AST  12  /  ALT  13  /  AlkPhos  51  04-07    LIVER FUNCTIONS - ( 2023 05:30 )  Alb: 4.1 g/dL / Pro: 6.7 g/dL / ALK PHOS: 51 U/L / ALT: 13 U/L / AST: 12 U/L / GGT: x           PT/INR - ( 2023 03:48 )   PT: 15.5 sec;   INR: 1.30          PTT - ( 2023 03:48 )  PTT:28.6 sec  Urinalysis Basic - ( 2023 11:18 )    Color: Yellow / Appearance: Clear / S.025 / pH: x  Gluc: x / Ketone: >=80 mg/dL  / Bili: Negative / Urobili: 1.0 E.U./dL   Blood: x / Protein: Trace mg/dL / Nitrite: NEGATIVE   Leuk Esterase: NEGATIVE / RBC: < 5 /HPF / WBC < 5 /HPF   Sq Epi: x / Non Sq Epi: x / Bacteria: Present /HPF      < from: CT Head No Cont (23 @ 05:44) >   CT BRAIN   ORDERED BY: VALERIE TESFAYE     IMPRESSION: No acute intracranial hemorrhage, acute transcortical   infarction, extra-axial fluid collection, or hydrocephalus.      < from: MR Head w/wo IV Cont (23 @ 13:24) >  MR BRAIN WAW IC   ORDERED BY: JENNI DILLARD     IMPRESSION: Normal MRI of the brain with and without gadolinium.      EEG REPORT:     Daily Summary:    No epileptiform activity and no significant clinical events occurred.       Neurology Progress Note    Interval History:    There were no acute events overnight. Patient not talking during encounter at this time.       PAST MEDICAL & SURGICAL HISTORY:  No pertinent past medical history  No significant past surgical history      Medications:  enoxaparin Injectable 40 milliGRAM(s) SubCutaneous every 24 hours  folic acid Injectable 1 milliGRAM(s) IV Push every 24 hours  LORazepam   Injectable 2 milliGRAM(s) IV Push every 8 hours  potassium chloride  20 mEq/100 mL IVPB 20 milliEquivalent(s) IV Intermittent every 2 hours  thiamine IVPB 500 milliGRAM(s) IV Intermittent every 8 hours      Vital Signs Last 24 Hrs  T(C): 36.9 (2023 17:15), Max: 37.7 (2023 10:00)  T(F): 98.4 (2023 17:15), Max: 99.9 (2023 10:00)  HR: 88 (2023 11:50) (84 - 96)  BP: 144/85 (2023 11:50) (129/77 - 145/93)  BP(mean): 108 (2023 11:50) (96 - 112)  RR: 16 (2023 11:50) (15 - 16)  SpO2: 96% (2023 11:50) (96% - 98%)    Parameters below as of 2023 11:50  Patient On (Oxygen Delivery Method): room air      Neurological Exam:   Mental status: Awake, not alert. Eyes open with volitional eye movements, not talking during encounter. Not following commands.  Cranial nerves: Pupils equally round and reactive to light, pinpoint pupils, face symmetric, tongue was midline.   Motor: Lowers upper extremities slowly when holding above face. Normal tone and bulk. No abnormal movements.    Sensation: Withdraws to pain in upper and lower extremities.  Coordination: Unable to assess.  Reflexes: 2+ in bilateral UE/LE, downgoing toes bilaterally.  Gait: Deferred.      Labs:  CBC Full  -  ( 2023 05:30 )  WBC Count : 9.64 K/uL  RBC Count : 4.81 M/uL  Hemoglobin : 15.0 g/dL  Hematocrit : 42.8 %  Platelet Count - Automated : 240 K/uL  Mean Cell Volume : 89.0 fl  Mean Cell Hemoglobin : 31.2 pg  Mean Cell Hemoglobin Concentration : 35.0 gm/dL  Auto Neutrophil # : 6.91 K/uL  Auto Lymphocyte # : 1.81 K/uL  Auto Monocyte # : 0.76 K/uL  Auto Eosinophil # : 0.08 K/uL  Auto Basophil # : 0.04 K/uL  Auto Neutrophil % : 71.7 %  Auto Lymphocyte % : 18.8 %  Auto Monocyte % : 7.9 %  Auto Eosinophil % : 0.8 %  Auto Basophil % : 0.4 %        139  |  102  |  13  ----------------------------<  211<H>  3.4<L>   |  23  |  0.81    Ca    9.4      2023 05:30  Phos  2.7     04-07  Mg     1.7         TPro  6.7  /  Alb  4.1  /  TBili  1.6<H>  /  DBili  x   /  AST  12  /  ALT  13  /  AlkPhos  51  04-07    LIVER FUNCTIONS - ( 2023 05:30 )  Alb: 4.1 g/dL / Pro: 6.7 g/dL / ALK PHOS: 51 U/L / ALT: 13 U/L / AST: 12 U/L / GGT: x           PT/INR - ( 2023 03:48 )   PT: 15.5 sec;   INR: 1.30          PTT - ( 2023 03:48 )  PTT:28.6 sec  Urinalysis Basic - ( 2023 11:18 )    Color: Yellow / Appearance: Clear / S.025 / pH: x  Gluc: x / Ketone: >=80 mg/dL  / Bili: Negative / Urobili: 1.0 E.U./dL   Blood: x / Protein: Trace mg/dL / Nitrite: NEGATIVE   Leuk Esterase: NEGATIVE / RBC: < 5 /HPF / WBC < 5 /HPF   Sq Epi: x / Non Sq Epi: x / Bacteria: Present /HPF      < from: CT Head No Cont (23 @ 05:44) >   CT BRAIN   ORDERED BY: VALERIE TESFAYE     IMPRESSION: No acute intracranial hemorrhage, acute transcortical   infarction, extra-axial fluid collection, or hydrocephalus.      < from: MR Head w/wo IV Cont (23 @ 13:24) >  MR BRAIN WAW IC   ORDERED BY: JENNI DILLARD     IMPRESSION: Normal MRI of the brain with and without gadolinium.      EEG REPORT:     Daily Summary:    No epileptiform activity and no significant clinical events occurred.

## 2023-04-07 NOTE — BH CONSULTATION LIAISON ASSESSMENT NOTE - OTHER
Brother Garcia [phone] Patient mute  Repetitive grimacing movements Poverty of content Patient catatonic Increased muscle tone

## 2023-04-08 LAB
ALBUMIN SERPL ELPH-MCNC: 4.1 G/DL — SIGNIFICANT CHANGE UP (ref 3.3–5)
ALP SERPL-CCNC: 49 U/L — SIGNIFICANT CHANGE UP (ref 40–120)
ALT FLD-CCNC: 13 U/L — SIGNIFICANT CHANGE UP (ref 10–45)
ANION GAP SERPL CALC-SCNC: 17 MMOL/L — SIGNIFICANT CHANGE UP (ref 5–17)
AST SERPL-CCNC: 14 U/L — SIGNIFICANT CHANGE UP (ref 10–40)
BASOPHILS # BLD AUTO: 0.06 K/UL — SIGNIFICANT CHANGE UP (ref 0–0.2)
BASOPHILS NFR BLD AUTO: 0.8 % — SIGNIFICANT CHANGE UP (ref 0–2)
BILIRUB SERPL-MCNC: 1.6 MG/DL — HIGH (ref 0.2–1.2)
BUN SERPL-MCNC: 13 MG/DL — SIGNIFICANT CHANGE UP (ref 7–23)
CALCIUM SERPL-MCNC: 9.3 MG/DL — SIGNIFICANT CHANGE UP (ref 8.4–10.5)
CHLORIDE SERPL-SCNC: 103 MMOL/L — SIGNIFICANT CHANGE UP (ref 96–108)
CHOLEST SERPL-MCNC: 109 MG/DL — SIGNIFICANT CHANGE UP
CO2 SERPL-SCNC: 24 MMOL/L — SIGNIFICANT CHANGE UP (ref 22–31)
CREAT SERPL-MCNC: 0.88 MG/DL — SIGNIFICANT CHANGE UP (ref 0.5–1.3)
EGFR: 122 ML/MIN/1.73M2 — SIGNIFICANT CHANGE UP
EOSINOPHIL # BLD AUTO: 0.19 K/UL — SIGNIFICANT CHANGE UP (ref 0–0.5)
EOSINOPHIL NFR BLD AUTO: 2.6 % — SIGNIFICANT CHANGE UP (ref 0–6)
GLUCOSE BLDC GLUCOMTR-MCNC: 120 MG/DL — HIGH (ref 70–99)
GLUCOSE SERPL-MCNC: 101 MG/DL — HIGH (ref 70–99)
HCT VFR BLD CALC: 44 % — SIGNIFICANT CHANGE UP (ref 39–50)
HDLC SERPL-MCNC: 25 MG/DL — LOW
HGB BLD-MCNC: 15.1 G/DL — SIGNIFICANT CHANGE UP (ref 13–17)
IMM GRANULOCYTES NFR BLD AUTO: 0.4 % — SIGNIFICANT CHANGE UP (ref 0–0.9)
LIPID PNL WITH DIRECT LDL SERPL: 68 MG/DL — SIGNIFICANT CHANGE UP
LYMPHOCYTES # BLD AUTO: 1.59 K/UL — SIGNIFICANT CHANGE UP (ref 1–3.3)
LYMPHOCYTES # BLD AUTO: 21.3 % — SIGNIFICANT CHANGE UP (ref 13–44)
MAGNESIUM SERPL-MCNC: 2 MG/DL — SIGNIFICANT CHANGE UP (ref 1.6–2.6)
MCHC RBC-ENTMCNC: 30.5 PG — SIGNIFICANT CHANGE UP (ref 27–34)
MCHC RBC-ENTMCNC: 34.3 GM/DL — SIGNIFICANT CHANGE UP (ref 32–36)
MCV RBC AUTO: 88.9 FL — SIGNIFICANT CHANGE UP (ref 80–100)
MONOCYTES # BLD AUTO: 0.67 K/UL — SIGNIFICANT CHANGE UP (ref 0–0.9)
MONOCYTES NFR BLD AUTO: 9 % — SIGNIFICANT CHANGE UP (ref 2–14)
NEUTROPHILS # BLD AUTO: 4.91 K/UL — SIGNIFICANT CHANGE UP (ref 1.8–7.4)
NEUTROPHILS NFR BLD AUTO: 65.9 % — SIGNIFICANT CHANGE UP (ref 43–77)
NON HDL CHOLESTEROL: 84 MG/DL — SIGNIFICANT CHANGE UP
NRBC # BLD: 0 /100 WBCS — SIGNIFICANT CHANGE UP (ref 0–0)
OSMOLALITY SERPL: 293 MOSM/KG — SIGNIFICANT CHANGE UP (ref 275–300)
PHOSPHATE SERPL-MCNC: 4.6 MG/DL — HIGH (ref 2.5–4.5)
PLATELET # BLD AUTO: 277 K/UL — SIGNIFICANT CHANGE UP (ref 150–400)
POTASSIUM SERPL-MCNC: 3.8 MMOL/L — SIGNIFICANT CHANGE UP (ref 3.5–5.3)
POTASSIUM SERPL-SCNC: 3.8 MMOL/L — SIGNIFICANT CHANGE UP (ref 3.5–5.3)
PROT SERPL-MCNC: 6.7 G/DL — SIGNIFICANT CHANGE UP (ref 6–8.3)
RBC # BLD: 4.95 M/UL — SIGNIFICANT CHANGE UP (ref 4.2–5.8)
RBC # FLD: 11.7 % — SIGNIFICANT CHANGE UP (ref 10.3–14.5)
SODIUM SERPL-SCNC: 144 MMOL/L — SIGNIFICANT CHANGE UP (ref 135–145)
TRIGL SERPL-MCNC: 78 MG/DL — SIGNIFICANT CHANGE UP
WBC # BLD: 7.45 K/UL — SIGNIFICANT CHANGE UP (ref 3.8–10.5)
WBC # FLD AUTO: 7.45 K/UL — SIGNIFICANT CHANGE UP (ref 3.8–10.5)

## 2023-04-08 PROCEDURE — 99233 SBSQ HOSP IP/OBS HIGH 50: CPT | Mod: GC

## 2023-04-08 PROCEDURE — 71045 X-RAY EXAM CHEST 1 VIEW: CPT | Mod: 26

## 2023-04-08 PROCEDURE — 99232 SBSQ HOSP IP/OBS MODERATE 35: CPT

## 2023-04-08 PROCEDURE — 95720 EEG PHY/QHP EA INCR W/VEEG: CPT

## 2023-04-08 RX ORDER — ACETAMINOPHEN 500 MG
650 TABLET ORAL EVERY 6 HOURS
Refills: 0 | Status: DISCONTINUED | OUTPATIENT
Start: 2023-04-08 | End: 2023-04-11

## 2023-04-08 RX ORDER — ACETAMINOPHEN 500 MG
1000 TABLET ORAL ONCE
Refills: 0 | Status: COMPLETED | OUTPATIENT
Start: 2023-04-08 | End: 2023-04-08

## 2023-04-08 RX ADMIN — Medication 650 MILLIGRAM(S): at 23:11

## 2023-04-08 RX ADMIN — Medication 105 MILLIGRAM(S): at 05:03

## 2023-04-08 RX ADMIN — CEFTRIAXONE 100 MILLIGRAM(S): 500 INJECTION, POWDER, FOR SOLUTION INTRAMUSCULAR; INTRAVENOUS at 06:04

## 2023-04-08 RX ADMIN — Medication 400 MILLIGRAM(S): at 12:10

## 2023-04-08 RX ADMIN — Medication 1000 MILLIGRAM(S): at 12:30

## 2023-04-08 RX ADMIN — ENOXAPARIN SODIUM 40 MILLIGRAM(S): 100 INJECTION SUBCUTANEOUS at 18:55

## 2023-04-08 RX ADMIN — Medication 2 MILLIGRAM(S): at 21:09

## 2023-04-08 RX ADMIN — Medication 105 MILLIGRAM(S): at 18:56

## 2023-04-08 RX ADMIN — Medication 1 MILLIGRAM(S): at 10:51

## 2023-04-08 RX ADMIN — Medication 105 MILLIGRAM(S): at 11:12

## 2023-04-08 RX ADMIN — Medication 2 MILLIGRAM(S): at 12:10

## 2023-04-08 RX ADMIN — Medication 2 MILLIGRAM(S): at 05:03

## 2023-04-08 NOTE — EEG REPORT - NS EEG TEXT BOX
Daily Updates (from 07:00 am until 07:00 am):  Day 2 4/7-4/8/2023:   Background:  continuous, with predominantly alpha and beta frequencies.  Symmetry:  No persistent asymmetries of voltage or frequency.  Posterior Dominant Rhythm:  10 Hz symmetric, well-organized, and well-modulated.  Organization: Appropriate anterior-posterior gradient.  Voltage:  Normal (20+ uV)  Variability: Yes. 		Reactivity: Yes.  N2 sleep: Symmetric, synchronous spindles and K complexes.  Spontaneous Activity:  No epileptiform discharges.  Periodic/rhythmic activity:  None  Events:  No electrographic seizures or significant clinical events.  Provocations:  Hyperventilation and Photic stimulation: was not performed.    Daily Summary:    There were no electrographic seizures or epileptiform abnormalities       Carmen Arreguin MD  Attending Neurologist, [facility]

## 2023-04-08 NOTE — PROGRESS NOTE ADULT - PROBLEM SELECTOR PLAN 3
Patient with bacteria on Urinalysis with urinary retention, unable to assess for other symptoms. Friend catheter placed.   - Plan for 3 days of Ctx 1 G IV (4/7 - 4/9). Patient with bacteria on Urinalysis with urinary retention, unable to assess for other symptoms. Friend catheter placed. Finished 3 days of Ctx coverage for empiric UTI treatment.

## 2023-04-08 NOTE — PROGRESS NOTE ADULT - PROBLEM SELECTOR PLAN 1
No personal or family hx of psych/neuro illnesses. Pt presented to ED 4/4 w/ delusions/paranoia x 3 days, involuntarily admitted to inpatient psychiatry for psychosis. 4/6 AM pt had an unwitnessed fall, urinary incontinence, and AMS, rapid response called and pt was brought to ED. Labs s/f new leukocytosis (9.05 --> 14.48), pt has tachycardia however remains afebrile. Lactate wnl, CK wnl. CTH negative. CTAP w/ markedly distended bladder w/ adjacent fat stranding & trace fluid, mild R hydroureteronephrosis. Suprapubic tenderness on exam, however UA +ketones otherwise negative. Friend catheter placed in ED. Patient underwent MRI w/wo contrast without acute findings. LP performed, no significant results noted. ESR, CRP negative. Unclear etiology of AMS, ddx includes CNS infectious or inflammatory process vs primary psychiatric disorder.    Plan:   - Continue Folate 1 mg IV qd and Thiamine 500 mg IV q8h x 3 days  - Discontinuing Acyclovir, Ctx 2g, and Vancomycin after one day given negative LP findings.   - NPO, patient would not tolerate S&S at this time.   - F/u vitamin B12, folate, HIV, syphilis, TSH, isopropanol, methanol, salicylate level, lyme IgG/IgM.  - vEEG monitoring - Negative for acute findings.   - neuro consulted, f/u recs  - epilepsy consulted, f/u recs  - psych consulted, f/u recs No personal or family hx of psych/neuro illnesses. Pt presented to ED 4/4 w/ delusions/paranoia x 3 days, involuntarily admitted to inpatient psychiatry for psychosis. 4/6 AM pt had an unwitnessed fall, urinary incontinence, and AMS, rapid response called and pt was brought to ED. Labs s/f new leukocytosis (9.05 --> 14.48), pt has tachycardia however remains afebrile. Lactate wnl, CK wnl. CTH negative. CTAP w/ markedly distended bladder w/ adjacent fat stranding & trace fluid, mild R hydroureteronephrosis. Suprapubic tenderness on exam, however UA +ketones otherwise negative. Friend catheter placed in ED. Patient underwent MRI w/wo contrast without acute findings. LP performed, no significant results noted. ESR, CRP negative. vEEG monitoring - Negative for acute findings. Diagnosis of catatonia, patient with positive ativan trial and negative workup otherwise.     Plan:   - Continue Folate 1 mg IV qd and Thiamine 500 mg IV q8h x 3 days  - Regular diet started due to improvement post ativan.   - neuro consulted, f/u recs  - epilepsy consulted, f/u recs  - psych consulted, f/u recs

## 2023-04-08 NOTE — EEG REPORT - NS EEG TEXT BOX
Daily Updates (from 07:00 am until 07:00 am):  Day 2  4/7-4/8/2023:   Pertinent medications: LTG 150mg BID, LEV stopped  Background:  continuous, with predominantly theta>delta frequencies.  Symmetry:  Irregular theta/delta over left hemisphere, more attenuated   Posterior Dominant Rhythm:  No clear PDR   Organization: Absent.  Voltage:  Normal (20+ uV)  Variability: Yes. 		Reactivity: Yes.  N2 sleep: Symmetric, synchronous spindles and K complexes.  Spontaneous Activity:  Occasional sharp waves over left hemisphere (variable maximal)   Periodic/rhythmic activity: None.  Events:  No electrographic seizures or significant clinical events.  Provocations:  Hyperventilation and Photic stimulation: not performed.  Daily Summary:    1)	Continuous Moderate generalized   slowing suggestive of a Moderate degree of diffuse or multifocal  dysfunction.  2)	Focal slowing over left hemisphere suggestive of underlying cerebral dysfunction  3)	Occasional sharp waves over left hemisphere (variable maximal) suggestive of underlying hyperexcitability    There were no electrographic seizures    Carmen Arreguin MD  Attending Neurologist, [facility]

## 2023-04-08 NOTE — PROGRESS NOTE ADULT - SUBJECTIVE AND OBJECTIVE BOX
Neurology Progress Note    Interval History:    ON: Medicine Team and family reported that Pt was having a lengthy conversation with the family last night after recieveing 2nd dose of ativan. He had a similar report after the 1st dose yesterday afternoon.     Patient was seen and examined w/ brother at bedside. In his Chadian language translated by brother he ask how long he has to stay in the hospital and how long for treatment course after hearing plan was discussed to him.      Medications:  enoxaparin Injectable 40 milliGRAM(s) SubCutaneous every 24 hours  folic acid Injectable 1 milliGRAM(s) IV Push every 24 hours  LORazepam   Injectable 2 milliGRAM(s) IV Push every 8 hours  thiamine IVPB 500 milliGRAM(s) IV Intermittent every 8 hours      Vital Signs Last 24 Hrs  T(C): 36.6 (08 Apr 2023 09:22), Max: 37.4 (07 Apr 2023 13:47)  T(F): 97.9 (08 Apr 2023 09:22), Max: 99.3 (07 Apr 2023 13:47)  HR: 76 (08 Apr 2023 08:24) (76 - 92)  BP: 132/83 (08 Apr 2023 08:24) (119/70 - 160/82)  BP(mean): 103 (08 Apr 2023 08:24) (88 - 110)  RR: 14 (08 Apr 2023 08:24) (14 - 18)  SpO2: 96% (08 Apr 2023 08:24) (95% - 98%)    Parameters below as of 08 Apr 2023 08:24  Patient On (Oxygen Delivery Method): room air      Neurological Examination:  General:  Appearance is consistent with chronologic age.   Cognitive/Language:  Awake and Alert. mutism. Does not follow commands. Nondysarthric.    Cranial Nerves  - Eyes: Visual acuity intact, unable to asses Visual fields however able to track room and no forced gaze. EOMI w/o nystagmus.  PERRL.  No ptosis.    - Face:  no facial asymmetry.    - Ears/Nose/Throat:  Hearing grossly intact to auditory stimuli.   Motor examination:  All 4 (MRC grade R/L) 5/5 UE; 5/5 LE prox/distal.  NPD. Normal tone and bulk. No tenderness, twitching, tremors or involuntary movements.  Sensory examination:  LT, JPS, Vib, Pin, Temp R=L; DSS; graphesthesia R=L.   Reflexes:   2+ R=L UE/LE. Plantar response downgoing b/l.  Jaw jerk, Hattie, clonus absent.  Cerebellum:   F-N/H-S intact.  No dysmetria.  BEN R=L  Gait narrow based walks oon heel, toes and tandem, Romberg    Labs:  CBC Full  -  ( 08 Apr 2023 06:18 )  WBC Count : 7.45 K/uL  RBC Count : 4.95 M/uL  Hemoglobin : 15.1 g/dL  Hematocrit : 44.0 %  Platelet Count - Automated : 277 K/uL  Mean Cell Volume : 88.9 fl  Mean Cell Hemoglobin : 30.5 pg  Mean Cell Hemoglobin Concentration : 34.3 gm/dL  Auto Neutrophil # : 4.91 K/uL  Auto Lymphocyte # : 1.59 K/uL  Auto Monocyte # : 0.67 K/uL  Auto Eosinophil # : 0.19 K/uL  Auto Basophil # : 0.06 K/uL  Auto Neutrophil % : 65.9 %  Auto Lymphocyte % : 21.3 %  Auto Monocyte % : 9.0 %  Auto Eosinophil % : 2.6 %  Auto Basophil % : 0.8 %    04-08    144  |  103  |  13  ----------------------------<  101<H>  3.8   |  24  |  0.88    Ca    9.3      08 Apr 2023 06:18  Phos  4.6     04-08  Mg     2.0     04-08    TPro  6.7  /  Alb  4.1  /  TBili  1.6<H>  /  DBili  x   /  AST  14  /  ALT  13  /  AlkPhos  49  04-08    LIVER FUNCTIONS - ( 08 Apr 2023 06:18 )  Alb: 4.1 g/dL / Pro: 6.7 g/dL / ALK PHOS: 49 U/L / ALT: 13 U/L / AST: 14 U/L / GGT: x                 RADIOLOGY & ADDITIONAL TESTS:   Neurology Progress Note    Interval History:    ON: Medicine Team and family reported that Pt was having a lengthy conversation with the family last night after recieveing 2nd dose of ativan. He had a similar report after the 1st dose yesterday afternoon.     Patient was seen and examined w/ brother at bedside. In his Botswanan language translated by brother he ask how long he has to stay in the hospital and how long for treatment course after hearing plan was discussed to him.      Medications:  enoxaparin Injectable 40 milliGRAM(s) SubCutaneous every 24 hours  folic acid Injectable 1 milliGRAM(s) IV Push every 24 hours  LORazepam   Injectable 2 milliGRAM(s) IV Push every 8 hours  thiamine IVPB 500 milliGRAM(s) IV Intermittent every 8 hours      Vital Signs Last 24 Hrs  T(C): 36.6 (08 Apr 2023 09:22), Max: 37.4 (07 Apr 2023 13:47)  T(F): 97.9 (08 Apr 2023 09:22), Max: 99.3 (07 Apr 2023 13:47)  HR: 76 (08 Apr 2023 08:24) (76 - 92)  BP: 132/83 (08 Apr 2023 08:24) (119/70 - 160/82)  BP(mean): 103 (08 Apr 2023 08:24) (88 - 110)  RR: 14 (08 Apr 2023 08:24) (14 - 18)  SpO2: 96% (08 Apr 2023 08:24) (95% - 98%)    Parameters below as of 08 Apr 2023 08:24  Patient On (Oxygen Delivery Method): room air      Neurological Examination:  General:  Appearance is consistent with chronologic age.   Cognitive/Language:  Awake and Alert. mutism. Does not follow commands. Nondysarthric.    Cranial Nerves  - Eyes: Visual acuity intact, unable to asses Visual fields however able to track room and no forced gaze. EOMI w/o nystagmus.  PERRL.  No ptosis.    - Face:  no facial asymmetry.    - Ears/Nose/Throat:  Hearing grossly intact to auditory stimuli.   Motor examination:  All 4 (MRC grade R/L) 5/5 UE; 5/5 LE prox/distal.  NPD. Normal tone and bulk. No tenderness, twitching, tremors or involuntary movements.  Sensory examination:  does not respond to inflicted noxious stimuli however, grimace w/ right elbow flexion at the IV site  Reflexes:   2+ R=L UE/LE.  Cerebellum:  deferred   Gait deferre     Labs:  CBC Full  -  ( 08 Apr 2023 06:18 )  WBC Count : 7.45 K/uL  RBC Count : 4.95 M/uL  Hemoglobin : 15.1 g/dL  Hematocrit : 44.0 %  Platelet Count - Automated : 277 K/uL  Mean Cell Volume : 88.9 fl  Mean Cell Hemoglobin : 30.5 pg  Mean Cell Hemoglobin Concentration : 34.3 gm/dL  Auto Neutrophil # : 4.91 K/uL  Auto Lymphocyte # : 1.59 K/uL  Auto Monocyte # : 0.67 K/uL  Auto Eosinophil # : 0.19 K/uL  Auto Basophil # : 0.06 K/uL  Auto Neutrophil % : 65.9 %  Auto Lymphocyte % : 21.3 %  Auto Monocyte % : 9.0 %  Auto Eosinophil % : 2.6 %  Auto Basophil % : 0.8 %    04-08    144  |  103  |  13  ----------------------------<  101<H>  3.8   |  24  |  0.88    Ca    9.3      08 Apr 2023 06:18  Phos  4.6     04-08  Mg     2.0     04-08    TPro  6.7  /  Alb  4.1  /  TBili  1.6<H>  /  DBili  x   /  AST  14  /  ALT  13  /  AlkPhos  49  04-08    LIVER FUNCTIONS - ( 08 Apr 2023 06:18 )  Alb: 4.1 g/dL / Pro: 6.7 g/dL / ALK PHOS: 49 U/L / ALT: 13 U/L / AST: 14 U/L / GGT: x                 RADIOLOGY & ADDITIONAL TESTS:

## 2023-04-08 NOTE — PROGRESS NOTE ADULT - PROBLEM SELECTOR PLAN 4
CTAP w/ markedly distended bladder w/ adjacent fat stranding & trace fluid, mild R hydroureteronephrosis. Suprapubic tenderness on exam, however UA +ketones otherwise negative. Friend catheter placed in ED.  - Continue with Friend catheter given previous urinary retention and AMS.  - Follow up Is&Os. CTAP w/ markedly distended bladder w/ adjacent fat stranding & trace fluid, mild R hydroureteronephrosis. Suprapubic tenderness on exam, however UA +ketones otherwise negative. Friend catheter placed in ED.  - Plan for trial of void today.  - Follow up Is&Os.

## 2023-04-08 NOTE — PROGRESS NOTE ADULT - PROBLEM SELECTOR PLAN 2
Pt met 2/4 SIRS criteria in ED (leukocytosis, tachycardia). BP 140s/70s-100s. Low suspicion for infection at this time.  - Leukocytosis improved with empiric antibiotics as above.

## 2023-04-08 NOTE — BH CONSULTATION LIAISON PROGRESS NOTE - NSBHASSESSMENTFT_PSY_ALL_CORE
27 year old Nigerian speaking man, domiciled with family,  (wife in [facility]), employed with no formal past psychiatric or medical history, initially admitted to [facility] but collapsed with episode of nonresponsiveness, now transferred to medicine for further management. Psychiatry following with concern for catatonia, initial Hernandez Mohsen 24, Ativan challenge initiated yesterday and ongoing with some improvements especially in terms of responsiveness soon after receiving each dose per staff. Patient on exam with signs of catatonia but with some improvement in following commands. Neurology also following r/o seizure disorder. 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     RECOMMENDATIONS:  --Continue 1:1 for unpredictability and elopement precautions  --Will likely need re-admission to psychiatry once medically cleared and catatonia improves  --Ativan 2mg tid PO/IV for catatonia; hold for over-sedation  --Avoid antipsychotics for now  --Continue neurology workup

## 2023-04-08 NOTE — PROGRESS NOTE ADULT - ASSESSMENT
This 27 year-old M w/ no significant PMH, presented to ED 4/4 w/ delusions/paranoia x 3 days, involuntarily admitted to inpatient psychiatry for psychosis. 4/6 AM pt had an unwitnessed fall, urinary incontinence, and AMS, rapid response called and pt was brought to ED. Admitted to [email or web address] for encephalopathy workup, stepped up to telemetry after rapid response and acute change in mentation in ED. Patient with waxing/waning mentation over the course of telemetry stay, neuro reports EEG consistent with normal awake mental activity. LP with negative infectious workup, autoimmune workup pending. Psychiatry reports physical exam and Hernandez Mohsen consistent with catatonia, will re-evaluate.

## 2023-04-08 NOTE — PROGRESS NOTE ADULT - PROBLEM SELECTOR PLAN 5
F: None   E: Replete as necessary K>4 Mg>2  N: NPO until speech & swallow assessment  DVT Prophylaxis: lovenox 40 mg SQ qd  GI prophylaxis: None  CODE STATUS: FULL CODE  Dispo: F F: None   E: Replete as necessary K>4 Mg>2  N: NPO until speech & swallow assessment  DVT Prophylaxis: Lovenox 40 mg SQ qd  GI prophylaxis: None  Dispo: [facility]

## 2023-04-08 NOTE — PROGRESS NOTE ADULT - ASSESSMENT
This is a 26yo No PMHx, admitted to Psychiatry for behavioral changes. During is course he was found unresponsive s/p Rapid response, ED eval and unremarkable acute stroke and seizure w/u including CSF studies. He was started on Benzo trial for suspected Catatonia w/ good response reported of Pt communicating w/ family and psychiatric team for a few hrs duration. Will continue Benzo and assess for gradual improvement.       Plan:  - can c/w vEEG x24hrs.   - F/u remaining CSF studies  - c/w Psychiatry recommendations   - c/w Benzodiazepine trial for suspected catatonia  - Continue rest of care as per primary team  - Please have Psych team contact family to discuss Dx, Tx and Goal  - No need for CTA H/N    Please call if there are any changes to patient's clinical or neurological status

## 2023-04-08 NOTE — PROGRESS NOTE ADULT - SUBJECTIVE AND OBJECTIVE BOX
INTERVAL HPI/OVERNIGHT EVENTS:  Patient was seen and examined at bedside. Case discussed with attending physician during morning rounds.     As per nurse, the patient had improved       VITAL SIGNS:  T(F): 97.9 (04-08-23 @ 09:22)  HR: 104 (04-08-23 @ 12:19)  BP: 128/76 (04-08-23 @ 12:19)  RR: 14 (04-08-23 @ 12:19)  SpO2: 96% (04-08-23 @ 12:19)  Wt(kg): --    PHYSICAL EXAM:    Constitutional: No acute distress, resting comfortably in bed.    HEENT: Pupils equal round and reactive to light, EOMI, sclera non-icteric, neck supple, trachea midline, no masses, no JVD, MMM, good dentition  Respiratory: Clear to ausculatation bilaterally. good air entry, no wheezing, no rhonchi, no rales, without accessory muscle use and no intercostal retractions  Cardiovascular: Regular rate and rhythm, normal S1S2, no murmurs, rubs, gallops.  Gastrointestinal: soft, non-tender and non-distended, no masses palpable, Normoactive bowel sounds.  Extremities: Warm, well perfused, pulses equal bilateral upper and lower extremities, no edema, no clubbing  Neurological: AAOx3, CN Grossly intact  Skin: Normal temperature, warm, dry.    MEDICATIONS  (STANDING):  enoxaparin Injectable 40 milliGRAM(s) SubCutaneous every 24 hours  folic acid Injectable 1 milliGRAM(s) IV Push every 24 hours  LORazepam   Injectable 2 milliGRAM(s) IV Push every 8 hours  thiamine IVPB 500 milliGRAM(s) IV Intermittent every 8 hours    MEDICATIONS  (PRN):      Allergies    No Known Allergies    Intolerances        LABS:                        15.1   7.45  )-----------( 277      ( 08 Apr 2023 06:18 )             44.0     04-08    144  |  103  |  13  ----------------------------<  101<H>  3.8   |  24  |  0.88    Ca    9.3      08 Apr 2023 06:18  Phos  4.6     04-08  Mg     2.0     04-08    TPro  6.7  /  Alb  4.1  /  TBili  1.6<H>  /  DBili  x   /  AST  14  /  ALT  13  /  AlkPhos  49  04-08            RADIOLOGY & ADDITIONAL TESTS:  Reviewed INTERVAL HPI/OVERNIGHT EVENTS:  Patient was seen and examined at bedside. Case discussed with attending physician during morning rounds.     As per nurse, the patient had improved for a period of time after ativan administration. Patient noted to revert to previous symptomatology with muscle rigidity this morning. Patient with grimacing noted with large position changes thought to be secondary to pain.       VITAL SIGNS:  T(F): 97.9 (04-08-23 @ 09:22)  HR: 104 (04-08-23 @ 12:19)  BP: 128/76 (04-08-23 @ 12:19)  RR: 14 (04-08-23 @ 12:19)  SpO2: 96% (04-08-23 @ 12:19)  Wt(kg): --    PHYSICAL EXAM:  Constitutional: Young male in no acute distress, patient is lying on bed.   HEENT: Sclera non-icteric, PERRL, Bell's phenomenon, neck supple.  Respiratory: Clear to auscultation bilaterally.   Cardiovascular: Regular rate and rhythm, normal S1S2.  Gastrointestinal: soft, non-tender and non-distended, Normoactive bowel sounds.  Extremities: Warm, well perfused, pulses equal bilateral upper and lower extremities, no edema.   Neurological: Non verbal, non-interactive.   Skin: Normal temperature, pulses intact.     MEDICATIONS  (STANDING):  enoxaparin Injectable 40 milliGRAM(s) SubCutaneous every 24 hours  folic acid Injectable 1 milliGRAM(s) IV Push every 24 hours  LORazepam   Injectable 2 milliGRAM(s) IV Push every 8 hours  thiamine IVPB 500 milliGRAM(s) IV Intermittent every 8 hours    MEDICATIONS  (PRN):      Allergies    No Known Allergies    Intolerances        LABS:                        15.1   7.45  )-----------( 277      ( 08 Apr 2023 06:18 )             44.0     04-08    144  |  103  |  13  ----------------------------<  101<H>  3.8   |  24  |  0.88    Ca    9.3      08 Apr 2023 06:18  Phos  4.6     04-08  Mg     2.0     04-08    TPro  6.7  /  Alb  4.1  /  TBili  1.6<H>  /  DBili  x   /  AST  14  /  ALT  13  /  AlkPhos  49  04-08            RADIOLOGY & ADDITIONAL TESTS:  Reviewed

## 2023-04-08 NOTE — BH CONSULTATION LIAISON PROGRESS NOTE - NSBHFUPINTERVALHXFT_PSY_A_CORE
Chart reviewed; patient seen today. He was undergoing vEEG; appeared stuporous on approach but attended to voice. Able to follow commands including close/open eyes and thumbs up, though with some psychomotor slowing. Ongoing rigidity and waxy flexibility in bilateral upper extremities with cogwheeling present. Mute and overall did not respond with Kyrgyz .     Per staff, patient was talking earlier with bedside family member after receiving a dose of Ativan several hours ago.    Did not appear over-sedated.

## 2023-04-09 LAB
ALBUMIN SERPL ELPH-MCNC: 3.9 G/DL — SIGNIFICANT CHANGE UP (ref 3.3–5)
ALP SERPL-CCNC: 52 U/L — SIGNIFICANT CHANGE UP (ref 40–120)
ALT FLD-CCNC: 14 U/L — SIGNIFICANT CHANGE UP (ref 10–45)
ANION GAP SERPL CALC-SCNC: 10 MMOL/L — SIGNIFICANT CHANGE UP (ref 5–17)
AST SERPL-CCNC: 14 U/L — SIGNIFICANT CHANGE UP (ref 10–40)
BASOPHILS # BLD AUTO: 0.05 K/UL — SIGNIFICANT CHANGE UP (ref 0–0.2)
BASOPHILS NFR BLD AUTO: 0.5 % — SIGNIFICANT CHANGE UP (ref 0–2)
BILIRUB SERPL-MCNC: 1.2 MG/DL — SIGNIFICANT CHANGE UP (ref 0.2–1.2)
BUN SERPL-MCNC: 10 MG/DL — SIGNIFICANT CHANGE UP (ref 7–23)
CALCIUM SERPL-MCNC: 9.4 MG/DL — SIGNIFICANT CHANGE UP (ref 8.4–10.5)
CHLORIDE SERPL-SCNC: 102 MMOL/L — SIGNIFICANT CHANGE UP (ref 96–108)
CO2 SERPL-SCNC: 25 MMOL/L — SIGNIFICANT CHANGE UP (ref 22–31)
CREAT SERPL-MCNC: 0.72 MG/DL — SIGNIFICANT CHANGE UP (ref 0.5–1.3)
EGFR: 130 ML/MIN/1.73M2 — SIGNIFICANT CHANGE UP
EOSINOPHIL # BLD AUTO: 0.19 K/UL — SIGNIFICANT CHANGE UP (ref 0–0.5)
EOSINOPHIL NFR BLD AUTO: 2.1 % — SIGNIFICANT CHANGE UP (ref 0–6)
GLUCOSE SERPL-MCNC: 122 MG/DL — HIGH (ref 70–99)
HCT VFR BLD CALC: 44.1 % — SIGNIFICANT CHANGE UP (ref 39–50)
HGB BLD-MCNC: 15.4 G/DL — SIGNIFICANT CHANGE UP (ref 13–17)
IMM GRANULOCYTES NFR BLD AUTO: 0.5 % — SIGNIFICANT CHANGE UP (ref 0–0.9)
LYMPHOCYTES # BLD AUTO: 1.86 K/UL — SIGNIFICANT CHANGE UP (ref 1–3.3)
LYMPHOCYTES # BLD AUTO: 20.4 % — SIGNIFICANT CHANGE UP (ref 13–44)
MAGNESIUM SERPL-MCNC: 1.9 MG/DL — SIGNIFICANT CHANGE UP (ref 1.6–2.6)
MCHC RBC-ENTMCNC: 30.9 PG — SIGNIFICANT CHANGE UP (ref 27–34)
MCHC RBC-ENTMCNC: 34.9 GM/DL — SIGNIFICANT CHANGE UP (ref 32–36)
MCV RBC AUTO: 88.6 FL — SIGNIFICANT CHANGE UP (ref 80–100)
MONOCYTES # BLD AUTO: 0.71 K/UL — SIGNIFICANT CHANGE UP (ref 0–0.9)
MONOCYTES NFR BLD AUTO: 7.8 % — SIGNIFICANT CHANGE UP (ref 2–14)
NEUTROPHILS # BLD AUTO: 6.26 K/UL — SIGNIFICANT CHANGE UP (ref 1.8–7.4)
NEUTROPHILS NFR BLD AUTO: 68.7 % — SIGNIFICANT CHANGE UP (ref 43–77)
NMDAR IGG TITR CSF IF: SIGNIFICANT CHANGE UP
NRBC # BLD: 0 /100 WBCS — SIGNIFICANT CHANGE UP (ref 0–0)
PHOSPHATE SERPL-MCNC: 4 MG/DL — SIGNIFICANT CHANGE UP (ref 2.5–4.5)
PLATELET # BLD AUTO: 272 K/UL — SIGNIFICANT CHANGE UP (ref 150–400)
POTASSIUM SERPL-MCNC: 4 MMOL/L — SIGNIFICANT CHANGE UP (ref 3.5–5.3)
POTASSIUM SERPL-SCNC: 4 MMOL/L — SIGNIFICANT CHANGE UP (ref 3.5–5.3)
PROT SERPL-MCNC: 7.1 G/DL — SIGNIFICANT CHANGE UP (ref 6–8.3)
RBC # BLD: 4.98 M/UL — SIGNIFICANT CHANGE UP (ref 4.2–5.8)
RBC # FLD: 11.8 % — SIGNIFICANT CHANGE UP (ref 10.3–14.5)
SODIUM SERPL-SCNC: 137 MMOL/L — SIGNIFICANT CHANGE UP (ref 135–145)
WBC # BLD: 9.12 K/UL — SIGNIFICANT CHANGE UP (ref 3.8–10.5)
WBC # FLD AUTO: 9.12 K/UL — SIGNIFICANT CHANGE UP (ref 3.8–10.5)

## 2023-04-09 PROCEDURE — 95720 EEG PHY/QHP EA INCR W/VEEG: CPT

## 2023-04-09 PROCEDURE — 99232 SBSQ HOSP IP/OBS MODERATE 35: CPT | Mod: GC

## 2023-04-09 PROCEDURE — 99233 SBSQ HOSP IP/OBS HIGH 50: CPT

## 2023-04-09 RX ADMIN — Medication 650 MILLIGRAM(S): at 08:51

## 2023-04-09 RX ADMIN — Medication 650 MILLIGRAM(S): at 00:17

## 2023-04-09 RX ADMIN — Medication 1 MILLIGRAM(S): at 08:52

## 2023-04-09 RX ADMIN — Medication 2 MILLIGRAM(S): at 05:10

## 2023-04-09 RX ADMIN — Medication 105 MILLIGRAM(S): at 03:43

## 2023-04-09 RX ADMIN — Medication 650 MILLIGRAM(S): at 09:30

## 2023-04-09 RX ADMIN — Medication 2 MILLIGRAM(S): at 13:44

## 2023-04-09 RX ADMIN — ENOXAPARIN SODIUM 40 MILLIGRAM(S): 100 INJECTION SUBCUTANEOUS at 17:17

## 2023-04-09 RX ADMIN — Medication 2 MILLIGRAM(S): at 22:04

## 2023-04-09 NOTE — BH CONSULTATION LIAISON PROGRESS NOTE - NSBHFUPINTERVALHXFT_PSY_A_CORE
Chart reviewed; patient seen today. He was undergoing vEEG; appeared stuporous on approach but attended to voice. Able to follow commands including close/open eyes and thumbs up, though with some psychomotor slowing. Ongoing rigidity and waxy flexibility in bilateral upper extremities with cogwheeling present. Mute and overall did not respond with Faroese .     Per staff, patient was talking earlier with bedside family member after receiving a dose of Ativan several hours ago.    Did not appear over-sedated. PT seen in presence of several family members including uncle and several cousins who spoke to him in Qatari. He was nonverbal during the session. Family did say he was more responsive and even laughed at some jokes. 1:1 reports patient was more engaged and using more eye contact. I provided psychoeducation to family about his medical and neuological workup which so far has been negative including VEEG. Provided psychoeducation to family about catatonia and rationale of ativan. Pt still using catheter which would need to bed successfully removed for transfer to 8U as catheters can't be used on 8U.

## 2023-04-09 NOTE — PROGRESS NOTE ADULT - ASSESSMENT
This 27 year-old M w/ no significant PMH, presented to ED 4/4 w/ delusions/paranoia x 3 days, involuntarily admitted to inpatient psychiatry for psychosis. 4/6 AM pt had an unwitnessed fall, urinary incontinence, and AMS, rapid response called and pt was brought to ED. Admitted to [facility] for encephalopathy workup, stepped up to telemetry after rapid response and acute change in mentation in ED. Patient with waxing/waning mentation over the course of telemetry stay, neuro reports EEG consistent with normal awake mental activity. LP with negative infectious workup, autoimmune workup pending. Psychiatry reports physical exam and Hernandez Mohsen consistent with catatonia, will re-evaluate.

## 2023-04-09 NOTE — BH CONSULTATION LIAISON PROGRESS NOTE - NSBHASSESSMENTFT_PSY_ALL_CORE
27 year old Tristanian speaking man, domiciled with family,  (wife in [facility]), employed with no formal past psychiatric or medical history, initially admitted to [facility] but collapsed with episode of nonresponsiveness, now transferred to medicine for further management. Psychiatry following with concern for catatonia, initial Hernandez Mohsen 24, Ativan challenge initiated yesterday and ongoing with some improvements especially in terms of responsiveness soon after receiving each dose per staff. Patient on exam with signs of catatonia but with some improvement in following commands. Neurology also following r/o seizure disorder. 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     RECOMMENDATIONS:  --Continue 1:1 for unpredictability and elopement precautions  --Will likely need re-admission to psychiatry once medically cleared and catatonia improves  --Ativan 2mg tid PO/IV for catatonia; hold for over-sedation  --Avoid antipsychotics for now  --Continue neurology workup 27 year old Bots[place]an speaking man, domiciled with family,  (wife in [facility]), employed with no formal past psychiatric or medical history, initially admitted to [facility] but collapsed with episode of nonresponsiveness, now transferred to medicine for further management. Psychiatry following with concern for catatonia, initial Hernandez Mohsen 24, Ativan challenge initiated yesterday and ongoing with some improvements especially in terms of responsiveness soon after receiving each dose per staff. Patient on exam with signs of catatonia but with some improvement in following commands. Neurology also following r/o seizure disorder. 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     RECOMMENDATIONS:  --Continue 1:1 for unpredictability and elopement precautions  --Will likely need re-admission to psychiatry once medically cleared and catatonia improves. Can't go to psychiatry with catheter.  --Ativan 2mg tid PO/IV for catatonia; hold for over-sedation  --Avoid antipsychotics for now  --Continue neurology workup

## 2023-04-09 NOTE — EEG REPORT - NS EEG TEXT BOX
[facility] Department of Neurology  Inpatient Continuous video-Electroencephalogram    Patient Name:	BROOKE SANCHEZ    :	-  MRN:	-    Study Start Date/Time:  2023, 6:53:35 PM  Study End Date/Time:    Referred by: Carmen Arreguin MD    Brief Clinical History:  BROOKE SANCHEZ is a - old – admitted for acute cognitive and behavioral changes; study performed to investigate for seizures or markers of epilepsy.  Admitted for acute psychosis and cognitive changes    Diagnosis Code:   R56.9 convulsions/seizure  The live video was: unmonitored.    Pertinent Medications:  none    Acquisition Details:  Electroencephalography was acquired using a minimum of 21 channels on an [employer] Neurology system v 9.3.1 with electrode placement according to the standard International 10-20 system following ACNS (American Clinical Neurophysiology Society) guidelines for Long-Term Video EEG monitoring.  Anterior temporal T1 and T2 electrodes were utilized whenever possible.   The [employer] automated spike & seizure detections were all reviewed in detail, in addition to extensive portions of raw EEG.      Day 1: 2023 @ 6:53:35 PM to next morning @ 07:00 am  Background:  continuous, with predominantly alpha and beta frequencies.  Symmetry:  No persistent asymmetries of voltage or frequency.  Posterior Dominant Rhythm:  10 Hz symmetric, well-organized, and well-modulated.  Organization: Appropriate anterior-posterior gradient.  Voltage:  Normal (20+ uV)  Variability: Yes. 		Reactivity: Yes.  N2 sleep: Symmetric, synchronous spindles and K complexes.  Spontaneous Activity:  No epileptiform discharges.  Periodic/rhythmic activity:  None  Events:  No electrographic seizures or significant clinical events.  Provocations:  Hyperventilation and Photic stimulation: was not performed.    Daily Summary:    There were no electrographic seizures or epileptiform abnormalities      Carmen Arreguin MD  Attending Neurologist, [facility]        Daily Updates (from 07:00 am until 07:00 am):  Day 2 -2023:   Background:  continuous, with predominantly alpha and beta frequencies.  Symmetry:  No persistent asymmetries of voltage or frequency.  Posterior Dominant Rhythm:  10 Hz symmetric, well-organized, and well-modulated.  Organization: Appropriate anterior-posterior gradient.  Voltage:  Normal (20+ uV)  Variability: Yes. 		Reactivity: Yes.  N2 sleep: Symmetric, synchronous spindles and K complexes.  Spontaneous Activity:  No epileptiform discharges.  Periodic/rhythmic activity:  None  Events:  No electrographic seizures or significant clinical events.  Provocations:  Hyperventilation and Photic stimulation: was not performed.    Daily Summary:    There were no electrographic seizures or epileptiform abnormalities      Carmen Arreguin MD  Attending Neurologist, [facility]        Daily Updates (from 07:00 am until 07:00 am):  Day 3 -2023:   Background:  continuous, with predominantly alpha and beta frequencies.  Symmetry:  No persistent asymmetries of voltage or frequency.  Posterior Dominant Rhythm:  10 Hz symmetric, well-organized, and well-modulated.  Organization: Appropriate anterior-posterior gradient.  Voltage:  Normal (20+ uV)  Variability: Yes. 		Reactivity: Yes.  N2 sleep: Symmetric, synchronous spindles and K complexes.  Spontaneous Activity:  No epileptiform discharges.  Periodic/rhythmic activity:  None  Events:  No electrographic seizures or significant clinical events.  Provocations:  Hyperventilation and Photic stimulation: was not performed.    Daily Summary:    There were no electrographic seizures or epileptiform abnormalities      Carmen Arreguin MD  Attending Neurologist, [facility]

## 2023-04-09 NOTE — PROGRESS NOTE ADULT - PROBLEM SELECTOR PLAN 1
No personal or family hx of psych/neuro illnesses. Pt presented to ED 4/4 w/ delusions/paranoia x 3 days, involuntarily admitted to inpatient psychiatry for psychosis. 4/6 AM pt had an unwitnessed fall, urinary incontinence, and AMS, rapid response called and pt was brought to ED. Labs s/f new leukocytosis (9.05 --> 14.48), pt has tachycardia however remains afebrile. Lactate wnl, CK wnl. CTH negative. CTAP w/ markedly distended bladder w/ adjacent fat stranding & trace fluid, mild R hydroureteronephrosis. Suprapubic tenderness on exam, however UA +ketones otherwise negative. Friend catheter placed in ED. Patient underwent MRI w/wo contrast without acute findings. LP performed, no significant results noted. ESR, CRP negative. vEEG monitoring - Negative for acute findings. Diagnosis of catatonia, patient with positive ativan trial and negative workup otherwise.     Plan:   - Continue Folate 1 mg IV qd and Thiamine 500 mg IV q8h x 3 days  - Regular diet started due to improvement post ativan.   - neuro consulted, f/u recs  - epilepsy consulted, f/u recs  - psych consulted, f/u recs

## 2023-04-09 NOTE — PROGRESS NOTE ADULT - PROBLEM SELECTOR PLAN 3
Patient with bacteria on Urinalysis with urinary retention, unable to assess for other symptoms. Friend catheter placed. Finished 3 days of Ctx coverage for empiric UTI treatment.

## 2023-04-09 NOTE — PROGRESS NOTE ADULT - PROBLEM SELECTOR PLAN 5
F: None   E: Replete as necessary K>4 Mg>2  N: NPO until speech & swallow assessment  DVT Prophylaxis: Lovenox 40 mg SQ qd  GI prophylaxis: None  Dispo: [facility]

## 2023-04-09 NOTE — PROGRESS NOTE ADULT - PROBLEM SELECTOR PLAN 4
CTAP w/ markedly distended bladder w/ adjacent fat stranding & trace fluid, mild R hydroureteronephrosis. Suprapubic tenderness on exam, however UA +ketones otherwise negative. Friend catheter placed in ED.  - Plan for trial of void today.  - Follow up Is&Os.

## 2023-04-09 NOTE — PROGRESS NOTE ADULT - SUBJECTIVE AND OBJECTIVE BOX
OVERNIGHT EVENTS:    SUBJECTIVE / INTERVAL HPI: Patient seen and examined at bedside.     VITAL SIGNS:  Vital Signs Last 24 Hrs  T(C): 36.9 (09 Apr 2023 06:41), Max: 37.6 (08 Apr 2023 18:48)  T(F): 98.5 (09 Apr 2023 06:41), Max: 99.7 (08 Apr 2023 22:12)  HR: 84 (09 Apr 2023 04:29) (84 - 104)  BP: 140/94 (09 Apr 2023 04:29) (128/76 - 156/99)  BP(mean): 112 (09 Apr 2023 04:29) (95 - 122)  RR: 16 (09 Apr 2023 04:29) (14 - 16)  SpO2: 98% (09 Apr 2023 04:29) (96% - 98%)    Parameters below as of 09 Apr 2023 04:29  Patient On (Oxygen Delivery Method): room air        PHYSICAL EXAM:    General: WDWN  HEENT: NCAT; PERRL, anicteric sclera; MMM  Neck: supple, trachea midline  Cardiovascular: S1, S2 normal; RRR, no M/G/R  Respiratory: CTABL; no W/R/R  Gastrointestinal: soft, nontender, nondistended. bowel sounds present.  Skin: no ulcerations or visible rashes appreciated  Extremities: WWP; no edema, clubbing or cyanosis  Vascular: 2+ radial, DP/PT pulses B/L  Neurological: AAOx3; CN II-XII grossly intact; no focal deficits    MEDICATIONS:  MEDICATIONS  (STANDING):  enoxaparin Injectable 40 milliGRAM(s) SubCutaneous every 24 hours  folic acid Injectable 1 milliGRAM(s) IV Push every 24 hours  LORazepam   Injectable 2 milliGRAM(s) IV Push every 8 hours    MEDICATIONS  (PRN):  acetaminophen     Tablet .. 650 milliGRAM(s) Oral every 6 hours PRN Temp greater or equal to 38C (100.4F), Mild Pain (1 - 3)      ALLERGIES:  Allergies    No Known Allergies    Intolerances        LABS:                        15.4   9.12  )-----------( 272      ( 09 Apr 2023 07:03 )             44.1     04-09    137  |  102  |  10  ----------------------------<  122<H>  4.0   |  25  |  0.72    Ca    9.4      09 Apr 2023 07:03  Phos  4.0     04-09  Mg     1.9     04-09    TPro  7.1  /  Alb  3.9  /  TBili  1.2  /  DBili  x   /  AST  14  /  ALT  14  /  AlkPhos  52  04-09        CAPILLARY BLOOD GLUCOSE      POCT Blood Glucose.: 120 mg/dL (08 Apr 2023 22:15)      RADIOLOGY & ADDITIONAL TESTS: Reviewed. OVERNIGHT EVENTS:  pt spiked low grade fever ON    SUBJECTIVE / INTERVAL HPI: Patient seen and examined at bedside. Pt lying in bed, using phone, tracking with eyes but is non-verbal throughout exam.      VITAL SIGNS:  Vital Signs Last 24 Hrs  T(C): 36.9 (09 Apr 2023 06:41), Max: 37.6 (08 Apr 2023 18:48)  T(F): 98.5 (09 Apr 2023 06:41), Max: 99.7 (08 Apr 2023 22:12)  HR: 84 (09 Apr 2023 04:29) (84 - 104)  BP: 140/94 (09 Apr 2023 04:29) (128/76 - 156/99)  BP(mean): 112 (09 Apr 2023 04:29) (95 - 122)  RR: 16 (09 Apr 2023 04:29) (14 - 16)  SpO2: 98% (09 Apr 2023 04:29) (96% - 98%)    Parameters below as of 09 Apr 2023 04:29  Patient On (Oxygen Delivery Method): room air        PHYSICAL EXAM:    General: NAD, lying comfortable in bed  HEENT: NCAT; PERRL, anicteric sclera; MMM  Neck: supple, trachea midline  Cardiovascular: S1, S2 normal; RRR, no M/G/R  Respiratory: CTABL; no W/R/R  Gastrointestinal: soft, nontender, nondistended. bowel sounds present.  Skin: no ulcerations or visible rashes appreciated  Extremities: WWP; no edema, clubbing or cyanosis  Vascular: 2+ radial, DP/PT pulses B/L  Neurological: AAOx3; CN II-XII grossly intact; no focal deficits    MEDICATIONS:  MEDICATIONS  (STANDING):  enoxaparin Injectable 40 milliGRAM(s) SubCutaneous every 24 hours  folic acid Injectable 1 milliGRAM(s) IV Push every 24 hours  LORazepam   Injectable 2 milliGRAM(s) IV Push every 8 hours    MEDICATIONS  (PRN):  acetaminophen     Tablet .. 650 milliGRAM(s) Oral every 6 hours PRN Temp greater or equal to 38C (100.4F), Mild Pain (1 - 3)      ALLERGIES:  Allergies    No Known Allergies    Intolerances        LABS:                        15.4   9.12  )-----------( 272      ( 09 Apr 2023 07:03 )             44.1     04-09    137  |  102  |  10  ----------------------------<  122<H>  4.0   |  25  |  0.72    Ca    9.4      09 Apr 2023 07:03  Phos  4.0     04-09  Mg     1.9     04-09    TPro  7.1  /  Alb  3.9  /  TBili  1.2  /  DBili  x   /  AST  14  /  ALT  14  /  AlkPhos  52  04-09        CAPILLARY BLOOD GLUCOSE      POCT Blood Glucose.: 120 mg/dL (08 Apr 2023 22:15)      RADIOLOGY & ADDITIONAL TESTS: Reviewed.

## 2023-04-10 ENCOUNTER — TRANSCRIPTION ENCOUNTER (OUTPATIENT)
Age: 28
End: 2023-04-10

## 2023-04-10 LAB
METHANOL BLD-MCNC: <.01 G/DL — SIGNIFICANT CHANGE UP (ref 0–0.01)
VDRL CSF-TITR: SIGNIFICANT CHANGE UP
WNV RNA SPEC QL NAA+PROBE: SIGNIFICANT CHANGE UP
WNV RNA SPEC QL NAA+PROBE: SIGNIFICANT CHANGE UP

## 2023-04-10 PROCEDURE — 99233 SBSQ HOSP IP/OBS HIGH 50: CPT

## 2023-04-10 PROCEDURE — 99233 SBSQ HOSP IP/OBS HIGH 50: CPT | Mod: GC

## 2023-04-10 RX ORDER — ENOXAPARIN SODIUM 100 MG/ML
40 INJECTION SUBCUTANEOUS
Qty: 0 | Refills: 0 | DISCHARGE
Start: 2023-04-10

## 2023-04-10 RX ADMIN — Medication 2 MILLIGRAM(S): at 12:23

## 2023-04-10 RX ADMIN — ENOXAPARIN SODIUM 40 MILLIGRAM(S): 100 INJECTION SUBCUTANEOUS at 17:55

## 2023-04-10 RX ADMIN — Medication 2 MILLIGRAM(S): at 05:49

## 2023-04-10 RX ADMIN — Medication 2 MILLIGRAM(S): at 20:59

## 2023-04-10 RX ADMIN — Medication 1 MILLIGRAM(S): at 08:42

## 2023-04-10 NOTE — DISCHARGE NOTE PROVIDER - HOSPITAL COURSE
This 27 year-old M w/ no significant PMH, presented to ED 4/4 w/ delusions/paranoia x 3 days, involuntarily admitted to inpatient psychiatry for psychosis. 4/6 AM pt had an unwitnessed fall, urinary incontinence, and AMS, rapid response called and pt was brought to ED. Admitted to [facility] for encephalopathy workup, stepped up to telemetry after rapid response and acute change in mentation in ED. Patient with waxing/waning mentation over the course of telemetry stay, neuro reports EEG consistent with normal awake mental activity. LP with negative infectious workup, autoimmune workup pending. Psychiatry reports physical exam and Hernandez Mohsen consistent with catatonia, will discuss transfer with psychiatry.     Catatonia: No personal or family hx of psych/neuro illnesses. Pt presented to ED 4/4 w/ delusions/paranoia x 3 days, involuntarily admitted to inpatient psychiatry for psychosis. 4/6 AM pt had an unwitnessed fall, urinary incontinence, and AMS, rapid response called and pt was brought to ED. Labs s/f new leukocytosis (9.05 --> 14.48), pt has tachycardia however remains afebrile. Lactate wnl, CK wnl. CTH negative. CTAP w/ markedly distended bladder w/ adjacent fat stranding & trace fluid, mild R hydroureteronephrosis. Suprapubic tenderness on exam, however UA +ketones otherwise negative. Friend catheter placed in ED. Patient underwent MRI w/wo contrast without acute findings. LP performed, no significant results noted. ESR, CRP negative. vEEG monitoring - Negative for acute findings. Diagnosis of catatonia, patient with positive ativan trial and negative workup otherwise. Continue thiamine PO supplementation.     SIRS with ? UTI: Pt met 2/4 SIRS criteria in ED (leukocytosis, tachycardia). BP 140s/70s-100s. Low suspicion for infection at this time. Leukocytosis improved with empiric antibiotics as above.    Urinary retention: CTAP w/ markedly distended bladder w/ adjacent fat stranding & trace fluid, mild R hydroureteronephrosis. Suprapubic tenderness on exam, however UA +ketones otherwise negative. Friend catheter placed in ED. Patient passed TOV.    Patient was discharged to: Inpatient Psychiatry.   New medications: Ativan 2 mg PO TID.   Changes to old medications: None.   Medications that were stopped: None.   Items to Follow up as outpatient Follow up with inpatient psychiatry.   Physical exam at time of discharge:   Constitutional: No acute distress, resting comfortably in bed.    HEENT: Sclera non-icteric, neck supple, MMM.  Respiratory: Clear to auscultation bilaterally, adequate air entry, no wheezing, no rhonchi, no rales, without accessory muscle use and no intercostal retractions.  Cardiovascular: Regular rate and rhythm, normal S1S2.  Gastrointestinal: soft, non-tender and non-distended, Normoactive bowel sounds.  Extremities: Warm, well perfused, pulses equal bilateral upper and lower extremities, no edema.  Neurological: Alert and awake, intermittently verbal.   Skin: Normal temperature, warm, dry.

## 2023-04-10 NOTE — PROGRESS NOTE ADULT - ASSESSMENT
This 27 year-old M w/ no significant PMH, presented to ED 4/4 w/ delusions/paranoia x 3 days, involuntarily admitted to inpatient psychiatry for psychosis. 4/6 AM pt had an unwitnessed fall, urinary incontinence, and AMS, rapid response called and pt was brought to ED. Admitted to [facility] for encephalopathy workup, stepped up to telemetry after rapid response and acute change in mentation in ED. Patient with waxing/waning mentation over the course of telemetry stay, neuro reports EEG consistent with normal awake mental activity. LP with negative infectious workup, autoimmune workup pending. Psychiatry reports physical exam and Hernandez Mohsen consistent with catatonia, will discuss transfer with psychiatry.

## 2023-04-10 NOTE — BH CONSULTATION LIAISON PROGRESS NOTE - NSBHFUPINTERVALHXFT_PSY_A_CORE
Patient was seen at bedside. Per the 1:1 patient fell asleep prior to this assessment. He remains withdrawn but more interactive with the staff and his family members (especially his brother).   Chart review shows that the pt hasn't had any episodes of agitation during the weekend. Pt's catheter was removed. His vitals have been stable for the last 24hrs. VEEG finished during the weekend: " no electrographic seizures or epileptiform abnormalities". The rest of the medical workup- unremarkable.      Patient was seen at bedside. Per the 1:1 patient fell asleep prior to this assessment. He remains withdrawn but more interactive with the staff and his family members (especially his brother).   Chart review shows that the pt hasn't had any episodes of agitation during the weekend. Pt's catheter was removed. His vitals have been stable for the last 24hrs. VEEG finished during the weekend: " no electrographic seizures or epileptiform abnormalities". The rest of the medical workup- unremarkable.   Addendum:  the writer d/w pt's brothers re re-admission to psychiatry. Per family members, patient's state has improve and he is more interactive however he continue to appear paranoid. They are in agreement with further psychiatric treatment but would like re-assurance from the inpatient team that they will be included in the discussions about treatment and will be able to visit their brother.

## 2023-04-10 NOTE — PROGRESS NOTE ADULT - SUBJECTIVE AND OBJECTIVE BOX
INTERVAL HPI/OVERNIGHT EVENTS:  Patient was seen and examined at bedside. Case discussed with attending physician during morning rounds.     As per nurse and patient, no o/n events. Patient noted with waxing/waning symptoms, no acute events but intermittent verbalizations.     VITAL SIGNS:  T(F): 98.3 (04-10-23 @ 09:48)  HR: 94 (04-10-23 @ 08:29)  BP: 142/95 (04-10-23 @ 08:29)  RR: 18 (04-10-23 @ 04:00)  SpO2: 96% (04-10-23 @ 08:29)  Wt(kg): --    PHYSICAL EXAM:    Constitutional: No acute distress, resting comfortably in bed.    HEENT: Sclera non-icteric, neck supple, MMM.  Respiratory: Clear to auscultation bilaterally, adequate air entry, no wheezing, no rhonchi, no rales, without accessory muscle use and no intercostal retractions.  Cardiovascular: Regular rate and rhythm, normal S1S2.  Gastrointestinal: soft, non-tender and non-distended, Normoactive bowel sounds.  Extremities: Warm, well perfused, pulses equal bilateral upper and lower extremities, no edema.  Neurological: Alert and awake, intermittently verbal.   Skin: Normal temperature, warm, dry.    MEDICATIONS  (STANDING):  enoxaparin Injectable 40 milliGRAM(s) SubCutaneous every 24 hours  folic acid Injectable 1 milliGRAM(s) IV Push every 24 hours  LORazepam     Tablet 2 milliGRAM(s) Oral every 8 hours    MEDICATIONS  (PRN):  acetaminophen     Tablet .. 650 milliGRAM(s) Oral every 6 hours PRN Temp greater or equal to 38C (100.4F), Mild Pain (1 - 3)      Allergies    No Known Allergies    Intolerances        LABS:                        15.4   9.12  )-----------( 272      ( 09 Apr 2023 07:03 )             44.1     04-09    137  |  102  |  10  ----------------------------<  122<H>  4.0   |  25  |  0.72    Ca    9.4      09 Apr 2023 07:03  Phos  4.0     04-09  Mg     1.9     04-09    TPro  7.1  /  Alb  3.9  /  TBili  1.2  /  DBili  x   /  AST  14  /  ALT  14  /  AlkPhos  52  04-09            RADIOLOGY & ADDITIONAL TESTS:  Reviewed

## 2023-04-10 NOTE — PROGRESS NOTE ADULT - PROBLEM SELECTOR PLAN 4
CTAP w/ markedly distended bladder w/ adjacent fat stranding & trace fluid, mild R hydroureteronephrosis. Suprapubic tenderness on exam, however UA +ketones otherwise negative. Friend catheter placed in ED. Patient passed TOV.

## 2023-04-10 NOTE — PROGRESS NOTE ADULT - PROBLEM SELECTOR PLAN 2
Pt met 2/4 SIRS criteria in ED (leukocytosis, tachycardia). BP 140s/70s-100s. Low suspicion for infection at this time. Leukocytosis improved with empiric antibiotics as above.

## 2023-04-10 NOTE — DISCHARGE NOTE PROVIDER - NSDCMRMEDTOKEN_GEN_ALL_CORE_FT
enoxaparin: 40 milligram(s) injectable once a day  LORazepam 2 mg oral tablet: 1 tab(s) orally every 8 hours

## 2023-04-10 NOTE — BH CONSULTATION LIAISON PROGRESS NOTE - NSBHASSESSMENTFT_PSY_ALL_CORE
Patient is a 27 year old Khmer speaking man, domiciled with family,  (wife in [facility]), employed with no formal past psychiatric or medical history, initially admitted to [facility] but collapsed with episode of nonresponsiveness, now transferred to medicine for further management. Psychiatry following with concern for catatonia, initial Hernandez Mohsen 24, Ativan challenge initiated on Friday and ongoing with some improvements especially in terms of responsiveness soon after receiving each dose per staff. Patient on exam with signs of catatonia but with some improvement in following commands. Medical and neurology workup until now unremarkable.  Differential: MDD with psychosis vs primary psychotic disorder now with catatonic features.     RECOMMENDATIONS:  --Continue 1:1 for unpredictability and elopement precautions  --Patient requires further inpatient involuntary psychiatric treatment; per the primary team the patient is medically cleared and ready for transfer   --Ativan 2mg tid PO;  hold for over-sedation  --Avoid antipsychotics for now

## 2023-04-10 NOTE — PROGRESS NOTE ADULT - PROBLEM SELECTOR PLAN 5
F: None   E: Replete as necessary K>4 Mg>2  N: Regular Diet.   DVT Prophylaxis: Lovenox 40 mg SQ qd  GI prophylaxis: None  Dispo: [facility]

## 2023-04-11 ENCOUNTER — INPATIENT (INPATIENT)
Facility: HOSPITAL | Age: 28
LOS: 14 days | Discharge: HOME | DRG: 884 | End: 2023-04-26
Attending: PSYCHIATRY & NEUROLOGY | Admitting: PSYCHIATRY & NEUROLOGY
Payer: COMMERCIAL

## 2023-04-11 ENCOUNTER — TRANSCRIPTION ENCOUNTER (OUTPATIENT)
Age: 28
End: 2023-04-11

## 2023-04-11 VITALS
SYSTOLIC BLOOD PRESSURE: 138 MMHG | HEART RATE: 109 BPM | TEMPERATURE: 97 F | RESPIRATION RATE: 18 BRPM | OXYGEN SATURATION: 97 % | DIASTOLIC BLOOD PRESSURE: 93 MMHG

## 2023-04-11 VITALS — TEMPERATURE: 99 F

## 2023-04-11 DIAGNOSIS — Z20.822 CONTACT WITH AND (SUSPECTED) EXPOSURE TO COVID-19: ICD-10-CM

## 2023-04-11 DIAGNOSIS — R32 UNSPECIFIED URINARY INCONTINENCE: ICD-10-CM

## 2023-04-11 DIAGNOSIS — F23 BRIEF PSYCHOTIC DISORDER: ICD-10-CM

## 2023-04-11 DIAGNOSIS — R40.4 TRANSIENT ALTERATION OF AWARENESS: ICD-10-CM

## 2023-04-11 LAB
ANION GAP SERPL CALC-SCNC: 9 MMOL/L — SIGNIFICANT CHANGE UP (ref 5–17)
BUN SERPL-MCNC: 16 MG/DL — SIGNIFICANT CHANGE UP (ref 7–23)
CALCIUM SERPL-MCNC: 9.8 MG/DL — SIGNIFICANT CHANGE UP (ref 8.4–10.5)
CHLORIDE SERPL-SCNC: 102 MMOL/L — SIGNIFICANT CHANGE UP (ref 96–108)
CO2 SERPL-SCNC: 26 MMOL/L — SIGNIFICANT CHANGE UP (ref 22–31)
CREAT SERPL-MCNC: 0.76 MG/DL — SIGNIFICANT CHANGE UP (ref 0.5–1.3)
EGFR: 128 ML/MIN/1.73M2 — SIGNIFICANT CHANGE UP
FOLATE SERPL-MCNC: 19.7 NG/ML — SIGNIFICANT CHANGE UP
GLUCOSE SERPL-MCNC: 107 MG/DL — HIGH (ref 70–99)
HCT VFR BLD CALC: 46.6 % — SIGNIFICANT CHANGE UP (ref 39–50)
HGB BLD-MCNC: 16.2 G/DL — SIGNIFICANT CHANGE UP (ref 13–17)
MAGNESIUM SERPL-MCNC: 2 MG/DL — SIGNIFICANT CHANGE UP (ref 1.6–2.6)
MCHC RBC-ENTMCNC: 31 PG — SIGNIFICANT CHANGE UP (ref 27–34)
MCHC RBC-ENTMCNC: 34.8 GM/DL — SIGNIFICANT CHANGE UP (ref 32–36)
MCV RBC AUTO: 89.1 FL — SIGNIFICANT CHANGE UP (ref 80–100)
NRBC # BLD: 0 /100 WBCS — SIGNIFICANT CHANGE UP (ref 0–0)
PHOSPHATE SERPL-MCNC: 3.7 MG/DL — SIGNIFICANT CHANGE UP (ref 2.5–4.5)
PLATELET # BLD AUTO: 339 K/UL — SIGNIFICANT CHANGE UP (ref 150–400)
POTASSIUM SERPL-MCNC: 4.4 MMOL/L — SIGNIFICANT CHANGE UP (ref 3.5–5.3)
POTASSIUM SERPL-SCNC: 4.4 MMOL/L — SIGNIFICANT CHANGE UP (ref 3.5–5.3)
RBC # BLD: 5.23 M/UL — SIGNIFICANT CHANGE UP (ref 4.2–5.8)
RBC # FLD: 11.9 % — SIGNIFICANT CHANGE UP (ref 10.3–14.5)
SODIUM SERPL-SCNC: 137 MMOL/L — SIGNIFICANT CHANGE UP (ref 135–145)
VIT B12 SERPL-MCNC: 511 PG/ML — SIGNIFICANT CHANGE UP (ref 232–1245)
WBC # BLD: 8.61 K/UL — SIGNIFICANT CHANGE UP (ref 3.8–10.5)
WBC # FLD AUTO: 8.61 K/UL — SIGNIFICANT CHANGE UP (ref 3.8–10.5)

## 2023-04-11 PROCEDURE — 85027 COMPLETE CBC AUTOMATED: CPT

## 2023-04-11 PROCEDURE — 74018 RADEX ABDOMEN 1 VIEW: CPT

## 2023-04-11 PROCEDURE — 82746 ASSAY OF FOLIC ACID SERUM: CPT

## 2023-04-11 PROCEDURE — 84133 ASSAY OF URINE POTASSIUM: CPT

## 2023-04-11 PROCEDURE — 70553 MRI BRAIN STEM W/O & W/DYE: CPT | Mod: ME

## 2023-04-11 PROCEDURE — 82962 GLUCOSE BLOOD TEST: CPT

## 2023-04-11 PROCEDURE — 80048 BASIC METABOLIC PNL TOTAL CA: CPT

## 2023-04-11 PROCEDURE — 85025 COMPLETE CBC W/AUTO DIFF WBC: CPT

## 2023-04-11 PROCEDURE — 83735 ASSAY OF MAGNESIUM: CPT

## 2023-04-11 PROCEDURE — 82042 OTHER SOURCE ALBUMIN QUAN EA: CPT

## 2023-04-11 PROCEDURE — 83935 ASSAY OF URINE OSMOLALITY: CPT

## 2023-04-11 PROCEDURE — 81001 URINALYSIS AUTO W/SCOPE: CPT

## 2023-04-11 PROCEDURE — 95700 EEG CONT REC W/VID EEG TECH: CPT

## 2023-04-11 PROCEDURE — 82040 ASSAY OF SERUM ALBUMIN: CPT

## 2023-04-11 PROCEDURE — 80307 DRUG TEST PRSMV CHEM ANLYZR: CPT

## 2023-04-11 PROCEDURE — 87483 CNS DNA AMP PROBE TYPE 12-25: CPT

## 2023-04-11 PROCEDURE — 82784 ASSAY IGA/IGD/IGG/IGM EACH: CPT

## 2023-04-11 PROCEDURE — 83916 OLIGOCLONAL BANDS: CPT

## 2023-04-11 PROCEDURE — 84100 ASSAY OF PHOSPHORUS: CPT

## 2023-04-11 PROCEDURE — 86901 BLOOD TYPING SEROLOGIC RH(D): CPT

## 2023-04-11 PROCEDURE — 82140 ASSAY OF AMMONIA: CPT

## 2023-04-11 PROCEDURE — 86618 LYME DISEASE ANTIBODY: CPT

## 2023-04-11 PROCEDURE — 86900 BLOOD TYPING SEROLOGIC ABO: CPT

## 2023-04-11 PROCEDURE — 36415 COLL VENOUS BLD VENIPUNCTURE: CPT

## 2023-04-11 PROCEDURE — 82945 GLUCOSE OTHER FLUID: CPT

## 2023-04-11 PROCEDURE — G1004: CPT

## 2023-04-11 PROCEDURE — 87385 HISTOPLASMA CAPSUL AG IA: CPT

## 2023-04-11 PROCEDURE — 87205 SMEAR GRAM STAIN: CPT

## 2023-04-11 PROCEDURE — 82803 BLOOD GASES ANY COMBINATION: CPT

## 2023-04-11 PROCEDURE — 82164 ANGIOTENSIN I ENZYME TEST: CPT

## 2023-04-11 PROCEDURE — 83873 ASSAY OF CSF PROTEIN: CPT

## 2023-04-11 PROCEDURE — 70450 CT HEAD/BRAIN W/O DYE: CPT | Mod: MA

## 2023-04-11 PROCEDURE — 74177 CT ABD & PELVIS W/CONTRAST: CPT | Mod: MA

## 2023-04-11 PROCEDURE — 87389 HIV-1 AG W/HIV-1&-2 AB AG IA: CPT

## 2023-04-11 PROCEDURE — 82550 ASSAY OF CK (CPK): CPT

## 2023-04-11 PROCEDURE — 80061 LIPID PANEL: CPT

## 2023-04-11 PROCEDURE — 89051 BODY FLUID CELL COUNT: CPT

## 2023-04-11 PROCEDURE — 86403 PARTICLE AGGLUT ANTBDY SCRN: CPT

## 2023-04-11 PROCEDURE — 85610 PROTHROMBIN TIME: CPT

## 2023-04-11 PROCEDURE — 82607 VITAMIN B-12: CPT

## 2023-04-11 PROCEDURE — 84156 ASSAY OF PROTEIN URINE: CPT

## 2023-04-11 PROCEDURE — 99285 EMERGENCY DEPT VISIT HI MDM: CPT

## 2023-04-11 PROCEDURE — 87529 HSV DNA AMP PROBE: CPT

## 2023-04-11 PROCEDURE — 83605 ASSAY OF LACTIC ACID: CPT

## 2023-04-11 PROCEDURE — 84300 ASSAY OF URINE SODIUM: CPT

## 2023-04-11 PROCEDURE — 87040 BLOOD CULTURE FOR BACTERIA: CPT

## 2023-04-11 PROCEDURE — 95716 VEEG EA 12-26HR CONT MNTR: CPT

## 2023-04-11 PROCEDURE — 86850 RBC ANTIBODY SCREEN: CPT

## 2023-04-11 PROCEDURE — 83930 ASSAY OF BLOOD OSMOLALITY: CPT

## 2023-04-11 PROCEDURE — 87798 DETECT AGENT NOS DNA AMP: CPT

## 2023-04-11 PROCEDURE — 86592 SYPHILIS TEST NON-TREP QUAL: CPT

## 2023-04-11 PROCEDURE — 80053 COMPREHEN METABOLIC PANEL: CPT

## 2023-04-11 PROCEDURE — 82570 ASSAY OF URINE CREATININE: CPT

## 2023-04-11 PROCEDURE — A9585: CPT

## 2023-04-11 PROCEDURE — 99233 SBSQ HOSP IP/OBS HIGH 50: CPT

## 2023-04-11 PROCEDURE — 86780 TREPONEMA PALLIDUM: CPT

## 2023-04-11 PROCEDURE — 71045 X-RAY EXAM CHEST 1 VIEW: CPT

## 2023-04-11 PROCEDURE — 86255 FLUORESCENT ANTIBODY SCREEN: CPT

## 2023-04-11 PROCEDURE — 87070 CULTURE OTHR SPECIMN AEROBIC: CPT

## 2023-04-11 PROCEDURE — 86140 C-REACTIVE PROTEIN: CPT

## 2023-04-11 PROCEDURE — 85652 RBC SED RATE AUTOMATED: CPT

## 2023-04-11 PROCEDURE — 80320 DRUG SCREEN QUANTALCOHOLS: CPT

## 2023-04-11 PROCEDURE — 86341 ISLET CELL ANTIBODY: CPT

## 2023-04-11 PROCEDURE — 99232 SBSQ HOSP IP/OBS MODERATE 35: CPT | Mod: GC

## 2023-04-11 PROCEDURE — 85730 THROMBOPLASTIN TIME PARTIAL: CPT

## 2023-04-11 PROCEDURE — 97161 PT EVAL LOW COMPLEX 20 MIN: CPT

## 2023-04-11 PROCEDURE — 84540 ASSAY OF URINE/UREA-N: CPT

## 2023-04-11 PROCEDURE — 84443 ASSAY THYROID STIM HORMONE: CPT

## 2023-04-11 PROCEDURE — 84157 ASSAY OF PROTEIN OTHER: CPT

## 2023-04-11 PROCEDURE — 86617 LYME DISEASE ANTIBODY: CPT

## 2023-04-11 RX ORDER — ACETAMINOPHEN 500 MG
650 TABLET ORAL EVERY 6 HOURS
Refills: 0 | Status: DISCONTINUED | OUTPATIENT
Start: 2023-04-11 | End: 2023-04-26

## 2023-04-11 RX ADMIN — Medication 2 MILLIGRAM(S): at 19:29

## 2023-04-11 RX ADMIN — Medication 1 MILLIGRAM(S): at 09:37

## 2023-04-11 RX ADMIN — Medication 2 MILLIGRAM(S): at 23:37

## 2023-04-11 RX ADMIN — Medication 2 MILLIGRAM(S): at 07:44

## 2023-04-11 RX ADMIN — Medication 2 MILLIGRAM(S): at 13:26

## 2023-04-11 NOTE — BH CONSULTATION LIAISON PROGRESS NOTE - NSBHMSESPEECH_PSY_A_CORE
Non-verbal: unable to assess speech further
Abnormal as indicated, otherwise normal...

## 2023-04-11 NOTE — DIETITIAN INITIAL EVALUATION ADULT - PERTINENT MEDS FT
MEDICATIONS  (STANDING):  enoxaparin Injectable 40 milliGRAM(s) SubCutaneous every 24 hours  folic acid Injectable 1 milliGRAM(s) IV Push every 24 hours  LORazepam     Tablet 2 milliGRAM(s) Oral every 8 hours    MEDICATIONS  (PRN):  acetaminophen     Tablet .. 650 milliGRAM(s) Oral every 6 hours PRN Temp greater or equal to 38C (100.4F), Mild Pain (1 - 3)

## 2023-04-11 NOTE — BH CONSULTATION LIAISON PROGRESS NOTE - LEVEL OF CONSCIOUSNESS
Alert
Lethargic, arousable to verbal stimulus

## 2023-04-11 NOTE — BH CONSULTATION LIAISON PROGRESS NOTE - NSBHASSESSMENTFT_PSY_ALL_CORE
Patient is a 27 year old Azeri speaking man, domiciled with family,  (wife in [facility]), employed with no formal past psychiatric or medical history, initially admitted to [facility] but collapsed with episode of nonresponsiveness, now transferred to medicine for further management. Psychiatry following with concern for catatonia, initial Hernandez Mohsen 24, Ativan challenge initiated on Friday and ongoing with some improvements especially in terms of responsiveness soon after receiving each dose per staff. Patient on exam with signs of catatonia but with some improvement in following commands. Medical and neurology workup until now unremarkable.  Differential: MDD with psychosis vs primary psychotic disorder now with catatonic features.

## 2023-04-11 NOTE — BH CONSULTATION LIAISON PROGRESS NOTE - NSBHATTESTBILLING_PSY_A_CORE
11640-Ayegyobbnf OBS or IP - moderate complexity OR 35-49 mins
04759-Zadlufdwjm OBS or IP - high complexity OR 50-79 mins
99714-Fsoyllqcal OBS or IP - high complexity OR 50-79 mins

## 2023-04-11 NOTE — BH CONSULTATION LIAISON PROGRESS NOTE - NSBHMSEJUDGE_PSY_A_CORE
Pt rhythm Sinus Tach 108 on the monitor. Notified Prema CHEN, will order repeat EKG  
Poor
Unable to assess

## 2023-04-11 NOTE — BH CONSULTATION LIAISON PROGRESS NOTE - NSBHCONSULTRECOMMENDOTHER_PSY_A_CORE FT
RECOMMENDATIONS:  --Continue 1:1 for unpredictability and elopement precautions  --Ativan 2mg tid PO;  hold for over-sedation  --Avoid antipsychotics for now  --Patient requires further inpatient involuntary psychiatric treatment; per the primary team the patient is medically cleared and ready for transfer

## 2023-04-11 NOTE — DISCHARGE NOTE NURSING/CASE MANAGEMENT/SOCIAL WORK - NSTOBACCONEVERSMOKERY/N_GEN_A
seen and examined; agree with above h&p.  symptoms essentially resolved and abd exam normal.  f/u in 1 yr and continue ppi.  repeat MRI next year.
No

## 2023-04-11 NOTE — BH CONSULTATION LIAISON PROGRESS NOTE - CURRENT MEDICATION
MEDICATIONS  (STANDING):  enoxaparin Injectable 40 milliGRAM(s) SubCutaneous every 24 hours  folic acid Injectable 1 milliGRAM(s) IV Push every 24 hours  LORazepam     Tablet 2 milliGRAM(s) Oral every 8 hours    MEDICATIONS  (PRN):  acetaminophen     Tablet .. 650 milliGRAM(s) Oral every 6 hours PRN Temp greater or equal to 38C (100.4F), Mild Pain (1 - 3)  
MEDICATIONS  (STANDING):  enoxaparin Injectable 40 milliGRAM(s) SubCutaneous every 24 hours  folic acid Injectable 1 milliGRAM(s) IV Push every 24 hours  LORazepam   Injectable 2 milliGRAM(s) IV Push every 8 hours    MEDICATIONS  (PRN):  acetaminophen     Tablet .. 650 milliGRAM(s) Oral every 6 hours PRN Temp greater or equal to 38C (100.4F), Mild Pain (1 - 3)  
MEDICATIONS  (STANDING):  enoxaparin Injectable 40 milliGRAM(s) SubCutaneous every 24 hours  folic acid Injectable 1 milliGRAM(s) IV Push every 24 hours  LORazepam     Tablet 2 milliGRAM(s) Oral every 8 hours    MEDICATIONS  (PRN):  acetaminophen     Tablet .. 650 milliGRAM(s) Oral every 6 hours PRN Temp greater or equal to 38C (100.4F), Mild Pain (1 - 3)  
MEDICATIONS  (STANDING):  enoxaparin Injectable 40 milliGRAM(s) SubCutaneous every 24 hours  folic acid Injectable 1 milliGRAM(s) IV Push every 24 hours  LORazepam   Injectable 2 milliGRAM(s) IV Push every 8 hours  thiamine IVPB 500 milliGRAM(s) IV Intermittent every 8 hours    MEDICATIONS  (PRN):

## 2023-04-11 NOTE — BH CONSULTATION LIAISON PROGRESS NOTE - NSICDXBHSECONDARYDX_PSY_ALL_CORE
Encephalopathy   G93.40  Prophylactic measure   Z29.9  SIRS (systemic inflammatory response syndrome)   R65.10  Urinary retention   R33.9  Acute UTI   N39.0  

## 2023-04-11 NOTE — PROGRESS NOTE ADULT - PROBLEM SELECTOR PROBLEM 4
Urinary retention
Prophylactic measure

## 2023-04-11 NOTE — PROGRESS NOTE ADULT - SUBJECTIVE AND OBJECTIVE BOX
INTERVAL HPI/OVERNIGHT EVENTS:  Patient was seen and examined at bedside. Case discussed with attending physician during morning rounds.     VITAL SIGNS:  T(F): 98.7 (04-11-23 @ 13:38)  HR: 90 (04-11-23 @ 11:16)  BP: 135/88 (04-11-23 @ 11:16)  RR: 20 (04-11-23 @ 11:16)  SpO2: 96% (04-11-23 @ 11:16)  Wt(kg): --    PHYSICAL EXAM:  Constitutional: No acute distress, resting comfortably in bed.    HEENT: Sclera non-icteric, neck supple, MMM.  Respiratory: Clear to auscultation bilaterally, adequate air entry, no wheezing, no rhonchi, no rales, without accessory muscle use and no intercostal retractions.  Cardiovascular: Regular rate and rhythm, normal S1S2.  Gastrointestinal: soft, non-tender and non-distended, Normoactive bowel sounds.  Extremities: Warm, well perfused, pulses equal bilateral upper and lower extremities, no edema.  Neurological: Alert and awake, intermittently verbal.   Skin: Normal temperature, warm, dry.    MEDICATIONS  (STANDING):  enoxaparin Injectable 40 milliGRAM(s) SubCutaneous every 24 hours  folic acid Injectable 1 milliGRAM(s) IV Push every 24 hours  LORazepam     Tablet 2 milliGRAM(s) Oral every 8 hours    MEDICATIONS  (PRN):  acetaminophen     Tablet .. 650 milliGRAM(s) Oral every 6 hours PRN Temp greater or equal to 38C (100.4F), Mild Pain (1 - 3)      Allergies    No Known Allergies    Intolerances        LABS:                        16.2   8.61  )-----------( 339      ( 11 Apr 2023 10:45 )             46.6     04-11    137  |  102  |  16  ----------------------------<  107<H>  4.4   |  26  |  0.76    Ca    9.8      11 Apr 2023 10:45  Phos  3.7     04-11  Mg     2.0     04-11              RADIOLOGY & ADDITIONAL TESTS:  Reviewed

## 2023-04-11 NOTE — PROGRESS NOTE ADULT - ATTENDING COMMENTS
Improvement in catatonia after starting ativan   yesterday was able to converse with family for a few hours after the dose. this morning his eyes are open, he responds to some questions from his brother, and is eating and drinking with assistance, although still has waxy flexibility   defer further treatment of catatonia to psychiatry  neurology will sign off, call back with further questions
Per family at bedside he was speaking to them earlier, seemed almost normal. However when he was getting an x-ray they left for a while and when they came back he was not speaking. On our exam he is lying in bed, no spontaneous movements, there is kirk's phenomenon when eyelids are opened, waxy flexibility present, protects face when arm dropped, grimaces when arms are moved and resists passive movement - signs concerning for catatonia  MRI, EEG and LP normal thus far   Agree with benzodiazepine trial per psychiatry  Will follow
RRT in psych unit to which he was admitted for a psychotic break. Now acutely encephalopathic and unclear why. LP done by neuro not c/w bacterial meningitis. No clear evidence of overdose. Possible catatonia versus atypical NMDA encephalitis? VEEG. Will monitor for now and consider benzos in the next 24 hours. Rest of plan as per above.
Pt moving to 8 uris. Family aware.
RRT in psych unit to which he was admitted for a psychotic break. LP done by neuro not c/w bacterial meningitis; abx stopped. VEEG negative for seizures. No clear evidence of overdose but brother endorses injection of a whole bottle of hydrogen peroxide; patient has zero evidence of any sequelae of caustic injury. Mental status now acutely improved; supportive of catatonia; c/w benzos. Should be moved back to psych; medically cleared to do so; has no mental capacity to refuse psych unit transfer. Rest of plan as per above.
Catatonia, urinary retention  physical as above  improved on ativan  contiue ny  psych f/u
Catatonia  physical as above  improved with ativan  continue ny  continue tele monitoring
RRT in psych unit to which he was admitted for a psychotic break. LP done by neuro not c/w bacterial meningitis; abx stopped. VEEG negative for seizures; can remove. No clear evidence of overdose but brother endorses injection of a whole bottle of hydrogen peroxide; patient has zero evidence of any sequelae of caustic injury. Mental status now acutely improved; supportive of catatonia. Trial of benzos. Rest of plan as per above.

## 2023-04-11 NOTE — DISCHARGE NOTE NURSING/CASE MANAGEMENT/SOCIAL WORK - PATIENT PORTAL LINK FT
You can access the FollowMyHealth Patient Portal offered by [facility] by registering at the following website: [email or web address]. By joining [facility]’s FollowMyHealth portal, you will also be able to view your health information using other applications (apps) compatible with our system.

## 2023-04-11 NOTE — PROGRESS NOTE ADULT - ASSESSMENT
This 27 year-old M w/ no significant PMH, presented to ED 4/4 w/ delusions/paranoia x 3 days, involuntarily admitted to inpatient psychiatry for psychosis. 4/6 AM pt had an unwitnessed fall, urinary incontinence, and AMS, rapid response called and pt was brought to ED. Admitted to [facility] for encephalopathy workup, stepped up to telemetry after rapid response and acute change in mentation in ED. Patient with waxing/waning mentation over the course of telemetry stay, neuro reports EEG consistent with normal awake mental activity. LP with negative infectious workup, autoimmune workup pending. Psychiatry reports physical exam and Hernandez Mohsen consistent with catatonia, will discuss transfer with psychiatry. Planned for discharge today with

## 2023-04-11 NOTE — DIETITIAN INITIAL EVALUATION ADULT - OTHER INFO
26 y/o M w/ no significant PMH, presented to ED 4/4 w/ delusions/paranoia x 3 days, involuntarily admitted to inpatient psychiatry for psychosis. 4/6 AM pt had an unwitnessed fall, urinary incontinence, and AMS, rapid response called and pt was brought to ED. Admitted to [facility] for encephalopathy workup.    Pt in care x2 attempts. Rx and labs reviewed. Pt presents with AMS and initially admitted to 8 now s/p fall and transfer to [facility] for further medical attention. Pt with limited PO intake as pt has been largely catatonic; medically management mentation and mood disorder and encourage nutrition as able. Plan to transfer back to [facility] today for further psychiatric management. No reports NVCD or difficult chew/swallow. NKA to food. RDN will continue to reassess, intervene, and monitor as appropriate.     Pain: 0 per chart review   GI: Abdomen ND/NT, +BS x4, LBM 4/9  Skin: WDI, no edema noted  26 y/o M w/ no significant PMH, presented to ED 4/4 w/ delusions/paranoia x 3 days, involuntarily admitted to inpatient psychiatry for psychosis. 4/6 AM pt had an unwitnessed fall, urinary incontinence, and AMS, rapid response called and pt was brought to ED. Admitted to [facility] for encephalopathy workup.    Pt assessed at bedside. Rx and labs reviewed. Pt presents with AMS and initially admitted to [facility] now s/p fall and transfer to [facility] for further medical attention. Pt with limited PO intake as pt has been largely catatonic; medically management mentation and mood disorder and encourage nutrition as able. Spoke with pt at bedside who was largely non-participatory in nutrition interview. Meal tray at bedside ~50% consumed. Plan to transfer back to [facility] today for further psychiatric management. No reports NVCD or difficult chew/swallow. NKA to food. RDN will continue to reassess, intervene, and monitor as appropriate.     Pain: 0 per chart review   GI: Abdomen ND/NT, +BS x4, LBM 4/9  Skin: WDI, no edema noted

## 2023-04-11 NOTE — DIETITIAN INITIAL EVALUATION ADULT - PERTINENT LABORATORY DATA
04-11    137  |  102  |  x   ----------------------------<  107<H>  4.4   |  26  |  0.76    Ca    9.8      11 Apr 2023 10:45  Phos  3.7     04-11  Mg     2.0     04-11

## 2023-04-11 NOTE — BH CONSULTATION LIAISON PROGRESS NOTE - NSBHFUPINTERVALHXFT_PSY_A_CORE
Chart reviewed and patient evaluated. As per nursing report, no significant events over the night and no PRNs given. Patient interviewed with [employer] translation services (Ezio - ID: [id number]). Upon evaluation, patient is AAOx3 (states that the month is may, but the year is 2023) and is a poor historian. As per PCA 1:1, patient appears to have reduced symptoms and is more interactive. Patient presents confusing timeline prior to hospitalization, stating that he "felt someone was monitoring his phone" but unable to provide context. States that he was not sleeping for days which led to him having a headache. Patient reports taking "30 advils" for headache which was corroborated by his brother at the bedside. Patient unable to state rationale for taking large amount of advils and denies consuming excessive advil in the context of SI/SA, paranoia, or AVH. Throughout the encounter,  has to repeat questions, restate the question, and clarify answers with the patient multiple times due to a suspected disorganized thought process. States that his current sleep and appetite are "okay." Denies any current delusions, paranoia, or AVH. Endorses having VH last night where he saw "dead family member." Denies any current SI/HI, intent, or plan. Family is agreeable to transferring patient to inpatient psychiatric unit for continuation of care.

## 2023-04-11 NOTE — PHYSICAL THERAPY INITIAL EVALUATION ADULT - PERTINENT HX OF CURRENT PROBLEM, REHAB EVAL
28 y/o M w/ no significant PMH, initially brought in to the ED by family on 4/4/23 for several days of bizarre behavior and was evaluated by behavioral health. Involuntarily admitted to inpatient psych for workup and treatment of acute psychosis (ddx includes brief psychotic disorder, schizophrenia, substance intoxication). 4/6/23 early AM pt had an unwitnessed fall in his room, team found him unresponsive w/ urine incontinence. Rapid response was called and he was brought to the ED. Upon evaluation in ED, pt is awake and alert but not answering questions. With the help of an , pt is able to state his name and "I am shaking." ROS limited by patient's mental status.    Per collateral from pt's brother: he is normally A&Ox3 and talkative, works 3 jobs as a superintendent, does not smoke/drink alcohol/use recreational drugs. Pt lives w/ his parents and his wife lives in [facility] and pt sends money to her. Pt's brother believes pt's wife broke up w/ him recently, however details are not clear as the pt and wife are private about this. Pt has been stressed, sleep deprived, no SI/HI. On 4/2/23 evening pt began complaining of "people coming after me, wanting to hurt me, hate me" and sounded very scared. Of note, he was complaining of "problems with his head" such as headaches and fatigue at least 1 month ago.

## 2023-04-11 NOTE — BH CONSULTATION LIAISON PROGRESS NOTE - NSBHATTESTCOMMENTATTENDFT_PSY_A_CORE
Patient is a 27 year old Kazakh speaking man, domiciled with family,  (wife in [facility]), employed with no formal past psychiatric or medical history, initially admitted to [facility] but collapsed with episode of nonresponsiveness, now transferred to medicine for further management. Psychiatry following with concern for catatonia, initial Hernandez Mohsen 24, Ativan challenge initiated on Friday and ongoing significant improvement. Patient now with mood and psychotic sx requiring further psychiatric treatment. The medical workup- unremarkable.  Differential: MDD with psychosis vs primary psychotic disorder      RECOMMENDATIONS:  --Continue 1:1 for unpredictability and elopement precautions  --Patient requires further inpatient psychiatric treatment (2PC); per the primary team the patient is medically cleared and ready for transfer   --Ativan 2mg tid PO;  hold for over-sedation  --Avoid antipsychotics for now

## 2023-04-11 NOTE — PROGRESS NOTE ADULT - PROVIDER SPECIALTY LIST ADULT
Internal Medicine
Neurology
Internal Medicine
Neurology
Epilepsy
Internal Medicine

## 2023-04-11 NOTE — PROGRESS NOTE ADULT - PROBLEM SELECTOR PLAN 1
No personal or family hx of psych/neuro illnesses. Pt presented to ED 4/4 w/ delusions/paranoia x 3 days, involuntarily admitted to inpatient psychiatry for psychosis. 4/6 AM pt had an unwitnessed fall, urinary incontinence, and AMS, rapid response called and pt was brought to ED. Labs s/f new leukocytosis (9.05 --> 14.48), pt has tachycardia however remains afebrile. Lactate wnl, CK wnl. CTH negative. CTAP w/ markedly distended bladder w/ adjacent fat stranding & trace fluid, mild R hydroureteronephrosis. Suprapubic tenderness on exam, however UA +ketones otherwise negative. Friend catheter placed in ED. Patient underwent MRI w/wo contrast without acute findings. LP performed, no significant results noted. ESR, CRP negative. vEEG monitoring - Negative for acute findings. Diagnosis of catatonia, patient with positive ativan trial and negative workup otherwise.     Plan:   - Regular diet started due to improvement post ativan.   - neuro consulted, f/u recs  - epilepsy consulted, f/u recs  - psych consulted, f/u recs

## 2023-04-11 NOTE — DISCHARGE NOTE NURSING/CASE MANAGEMENT/SOCIAL WORK - NSDCPEFALRISK_GEN_ALL_CORE
For information on Fall & Injury Prevention, visit: [email or web address] OR  [email or web address] OR  https://www.cdc.gov/steadi/patient.html

## 2023-04-11 NOTE — DIETITIAN INITIAL EVALUATION ADULT - ADD RECOMMEND
-Continue current diet   -Encourage good PO intake   -Monitor need/agreeability for ONS regimen   -Honor food preferences as able  -Monitor mentation and ability to tolerate meals  -Monitor chemistry, GI fxn, and skin integrity

## 2023-04-11 NOTE — BH CONSULTATION LIAISON PROGRESS NOTE - GENERAL APPEARANCE
Mildly to Moderately Impaired: difficulty hearing in some environments or speaker may need to increase volume or speak distinctly
No deformities present

## 2023-04-12 DIAGNOSIS — F32.3 MAJOR DEPRESSIVE DISORDER, SINGLE EPISODE, SEVERE WITH PSYCHOTIC FEATURES: ICD-10-CM

## 2023-04-12 DIAGNOSIS — F20.9 SCHIZOPHRENIA, UNSPECIFIED: ICD-10-CM

## 2023-04-12 LAB
CHOLEST SERPL-MCNC: 108 MG/DL — SIGNIFICANT CHANGE UP
HDLC SERPL-MCNC: 25 MG/DL — LOW
INNER EAR 68KD AB FLD QL: <1.5 U/L — SIGNIFICANT CHANGE UP (ref 0–2.5)
LIPID PNL WITH DIRECT LDL SERPL: 59 MG/DL — SIGNIFICANT CHANGE UP
NON HDL CHOLESTEROL: 83 MG/DL — SIGNIFICANT CHANGE UP
TRIGL SERPL-MCNC: 118 MG/DL — SIGNIFICANT CHANGE UP

## 2023-04-12 PROCEDURE — 99223 1ST HOSP IP/OBS HIGH 75: CPT

## 2023-04-12 RX ORDER — TRAZODONE HCL 50 MG
50 TABLET ORAL AT BEDTIME
Refills: 0 | Status: DISCONTINUED | OUTPATIENT
Start: 2023-04-12 | End: 2023-04-26

## 2023-04-12 RX ORDER — OLANZAPINE 15 MG/1
5 TABLET, FILM COATED ORAL
Refills: 0 | Status: DISCONTINUED | OUTPATIENT
Start: 2023-04-12 | End: 2023-04-14

## 2023-04-12 RX ORDER — OLANZAPINE 15 MG/1
5 TABLET, FILM COATED ORAL
Refills: 0 | Status: DISCONTINUED | OUTPATIENT
Start: 2023-04-12 | End: 2023-04-12

## 2023-04-12 RX ORDER — OLANZAPINE 15 MG/1
5 TABLET, FILM COATED ORAL EVERY 8 HOURS
Refills: 0 | Status: DISCONTINUED | OUTPATIENT
Start: 2023-04-12 | End: 2023-04-26

## 2023-04-12 RX ADMIN — Medication 2 MILLIGRAM(S): at 22:42

## 2023-04-12 RX ADMIN — Medication 2 MILLIGRAM(S): at 12:01

## 2023-04-12 NOTE — BH INPATIENT PSYCHIATRY ASSESSMENT NOTE - NSBHCHARTREVIEWVS_PSY_A_CORE FT
Vital Signs Last 24 Hrs  T(C): 36.4 (04-11-23 @ 23:39), Max: 36.4 (04-11-23 @ 23:39)  T(F): 97.5 (04-11-23 @ 23:39), Max: 97.5 (04-11-23 @ 23:39)  HR: 89 (04-12-23 @ 06:00) (83 - 109)  BP: 102/69 (04-12-23 @ 06:00) (102/69 - 138/93)  BP(mean): --  RR: 18 (04-12-23 @ 06:00) (18 - 18)  SpO2: 99% (04-12-23 @ 06:00) (97% - 99%)     Vital Signs Last 24 Hrs  T(C): 36.9 (04-12-23 @ 17:20), Max: 36.9 (04-12-23 @ 17:20)  T(F): 98.4 (04-12-23 @ 17:20), Max: 98.4 (04-12-23 @ 17:20)  HR: 89 (04-12-23 @ 17:20) (83 - 89)  BP: 133/89 (04-12-23 @ 17:20) (102/69 - 133/89)  BP(mean): --  RR: 18 (04-12-23 @ 17:20) (18 - 18)  SpO2: 99% (04-12-23 @ 17:20) (99% - 99%)

## 2023-04-12 NOTE — BH INPATIENT PSYCHIATRY ASSESSMENT NOTE - NSBHATTESTCOMMENTATTENDFT_PSY_A_CORE
;;04/12:  patient mute and almost motionless in am but did follow writer about the room with his eyes; in the afternoon visited by parents and appeared to speak to them and said hello in Maori to the writer;  in view of little information would use combination of Ativan IM for catatonia and Zydis as suspect psychotic condition is underlying cataonia as patient is selectively mute and selectively catatonic.

## 2023-04-12 NOTE — BH INPATIENT PSYCHIATRY ASSESSMENT NOTE - NSTXSLFCREGOAL_PSY_ALL_CORE
Be able to describe elements of self-care and a plan to attend to these elements
Be able to describe elements of self-care and a plan to attend to these elements

## 2023-04-12 NOTE — BH INPATIENT PSYCHIATRY ASSESSMENT NOTE - RISK ASSESSMENT
Patient is classified as LOW risk for suicide at this time due to lack of know past attempts as per my previous conversation with brother on 04.07.23 and has not endorsed wanting to die or kill himself either in the past or recently prior to hospital admission. Patient has good familial support, is  and has shown rapid response to treatment for catatonia.

## 2023-04-12 NOTE — BH CHART NOTE - NSEVENTNOTEFT_PSY_ALL_CORE
Family Meeting 04.12.23:  Writer met with patient and his parents who were visiting with help from  [place] [id number]. Patient was more talkative but appeared irritable. He stated, "I am a bit tired" and "I didn't hurt anyone, I didn't do anything to anyone'. When reassured that writer was aware patient had not hurt anyone, he stated, "then why am I am here, I don't need to be here". It was reiterated to the patient by his mother that he had been not feeling well so the family had brought him to the hospital, with patient replying, 'That was downstairs, the hospital, why am I here". Writer explained to the patient that he was still in the same hospital but a different floor and unit which he seemed to accept by simply nodding.   I spoke to the mother who stated, "he is much much better but he isn't eating, is it because he doesn't like the food". Parents had brought the patient some fruit which he had barely picked on and when asked why, he stated, "I am not used to eating that in the hospital, I ll take an apple". He then abruptly got up and said goodbye to his parents (he did hug and kiss each of them). It was noticeable that during the visit, he was constantly scanning the room, looking behind him and then appeared to be lost in thought during which he did not respond to verbal stimuli. Per his father, 'he is talking more and looks tired but much better. I wouldn't say he is back to 100%". I spoke about the parents about starting patient on standing Ativan with the risks and benefits discussed along with requiring more time in the hospital to be fully treated. Parents did not demand discharge and also asked I speak to one of their other sons (Emmett [phone]) which I did over the phone.     I checked in on the patient later, he was sitting with his arms and legs crossed on the chair and as soon as I entered he stated, "What was the one question you asked my parents?" (in english). When I asked him to clarify what he meant he stated, " Did my parents leave, where did they go". When I replied, "Home", he stated, "Perfect" (loudly) and "thank you".

## 2023-04-12 NOTE — BH INPATIENT PSYCHIATRY ASSESSMENT NOTE - NSBHMETABOLIC_PSY_ALL_CORE_FT
BMI: BMI (kg/m2): 26.4 (04-06-23 @ 14:02)  HbA1c:   Glucose: POCT Blood Glucose.: 120 mg/dL (04-08-23 @ 22:15)    BP: 102/69 (04-12-23 @ 06:00) (102/69 - 138/93)  Lipid Panel: Date/Time: 04-12-23 @ 05:30  Cholesterol, Serum: 108  Direct LDL: --  HDL Cholesterol, Serum: 25  Total Cholesterol/HDL Ration Measurement: --  Triglycerides, Serum: 118   BMI: BMI (kg/m2): 26.4 (04-06-23 @ 14:02)  HbA1c:   Glucose: POCT Blood Glucose.: 120 mg/dL (04-08-23 @ 22:15)    BP: 133/89 (04-12-23 @ 17:20) (102/69 - 138/93)  Lipid Panel: Date/Time: 04-12-23 @ 05:30  Cholesterol, Serum: 108  Direct LDL: --  HDL Cholesterol, Serum: 25  Total Cholesterol/HDL Ration Measurement: --  Triglycerides, Serum: 118

## 2023-04-12 NOTE — BH PATIENT PROFILE - FALL HARM RISK - HARM RISK INTERVENTIONS
Communicate Risk of Fall with Harm to all staff/Discuss with provider need for PT consult/Monitor for mental status changes/Monitor gait and stability/Move patient closer to nurses' station/Reinforce activity limits and safety measures with patient and family/Reorient to person, place and time as needed/Tailored Fall Risk Interventions/Toileting schedule using arm’s reach rule for commode and bathroom/Use of alarms - bed, chair and/or voice tab/Visual Cue: Yellow wristband and red socks/Non-slip footwear when patient is out of bed/Physically safe environment - no spills, clutter or unnecessary equipment/Purposeful Proactive Rounding/Room/bathroom lighting operational, light cord in reach

## 2023-04-12 NOTE — BH PATIENT PROFILE - DYSPHAGIA SCREENING CONCLUSION
Addended by: KALIA ZAVALA on: 3/8/2023 01:36 PM     Modules accepted: Orders     Contraindicated:  Notify Provider

## 2023-04-12 NOTE — BH PATIENT PROFILE - NSPROIMPLANTSMEDDEV_GEN_A_NUR
Large Joint Aspiration/Injection: R knee  Date/Time: 8/16/2018 3:01 PM  Performed by: Feliberto Cardenas MD  Authorized by: Feliberto Cardenas MD     Consent Done?:  Yes (Verbal)  Indications:  Pain  Procedure site marked: Yes    Timeout: Prior to procedure the correct patient, procedure, and site was verified      Location:  Knee  Site:  R knee  Prep: Patient was prepped and draped in usual sterile fashion    Needle size:  20 G  Approach:  Anterolateral  Medications:  16 mg hyaluronate 16 mg/2 mL  Patient tolerance:  Patient tolerated the procedure well with no immediate complications       None

## 2023-04-12 NOTE — BH INPATIENT PSYCHIATRY ASSESSMENT NOTE - NSBHASSESSSUMMFT_PSY_ALL_CORE
27 year old Urdu speaking man, working part time in construction and as a super attendant,  (wife in [facility]) with no children, domiciled with family, with no known past psychiatric history or medical history, brought to the hospital by family due to bizarre behavior for 1 day. Patient was endorsing paranoid ideation and ideas of reference to family which precipitated his hospital visit.  Initially he was admitted to inpatient psychiatry on 04.05.23 but early in admission was found unresponsive on the floor, rapid response was called and patient was transferred to medicine on 04.06.23. He was admitted for medical work-up and suspected infection or seizure disorder from 04.06.23 to 04.11.23. His work up including blood work, urine toxicology, neuroimaging (MRI and CT) plus 24 hr vEEG was not indicative of an organic cause and patient was transferred back to psychiatry on 04.12.23. During his time on medication, he was followed by psychiatry consult from 04.07.23 to 04.11.23 with recommended treatment for Catatonia started which patient responded too.  On assessment today, he initially presented with Hernandez Mohsen Score of 17 with mutism, fixed gaze, negativism, rigidity, meitghen, withdrawal, ambitendency and perseveration. Despite use of , history taking was limited as patient would respond with nonsense words to certain questions as per  but other times would respond linearly. He was examined briefly post Ativan dose with improvement noted in rigidity, mutism and negativism but other symptoms remain unchanged. He was also noticed to be easily distractible and possibly responding to internal stimuli.  Based on assessment and history, it is unclear what the precipitating factors are at this time but perpetuating factors include conflict with family, being away from wife and long prodrome period. Predisposing factors include substance use in father and his protective factors are support from his brothers, being able to maintain employment, and quick response to psychopharmacological intervention. At this time, his primary diagnosis is Catatonia and with high suspicion for primary psychotic disorder but also ruling out MDD with psychotic features.    Plan:  1. Patient is not an imminent danger to self or other but unable to effectively manage ADL's so constant observation ongoing.  2. Psychopharmacological:  Standing Lorazepam 2 mg intramuscular at 9 am, 3 pm and 7 pm daily for Catatonia; to be titrated up or down based on response and possibly converted to oral once patient is more engaged  Start Olanzapine disintegrating tablet 5 mg nightly for psychotic features  3. Medical: HBA1c to be ordered as patient on standing psychotropic medications and PT consult ordered for ambulation concerns  4. Collateral: Attempted to call Brother [facility] [phone] but only able to leave voicemail today and will follow up.

## 2023-04-12 NOTE — BH INPATIENT PSYCHIATRY ASSESSMENT NOTE - PATIENT'S CHIEF COMPLAINT
Patient is mute, unable to given chief complaint. Patient is classified as LOW risk for suicide at this time due to lack of know past attempts as per my previous conversation with brother on 04.07.23 and has not endorsed wanting to die or kill himself either in the past or recently prior to hospital admission. Patient has good familial support, is  and has shown rapid response to treatment for catatonia. 
Patient mostly mute, unable to give chief complaint

## 2023-04-12 NOTE — BH INPATIENT PSYCHIATRY ASSESSMENT NOTE - NSBHMETABOLIC_PSY_ALL_CORE_FT
BMI: BMI (kg/m2): 26.4 (04-06-23 @ 14:02)  HbA1c:   Glucose: POCT Blood Glucose.: 120 mg/dL (04-08-23 @ 22:15)    BP: 102/69 (04-12-23 @ 06:00) (102/69 - 138/93)  Lipid Panel: Date/Time: 04-12-23 @ 05:30  Cholesterol, Serum: 108  Direct LDL: --  HDL Cholesterol, Serum: 25  Total Cholesterol/HDL Ration Measurement: --  Triglycerides, Serum: 118

## 2023-04-12 NOTE — BH INPATIENT PSYCHIATRY ASSESSMENT NOTE - CURRENT MEDICATION
MEDICATIONS  (STANDING):  LORazepam   Injectable 2 milliGRAM(s) IntraMuscular <User Schedule>  OLANZapine Disintegrating Tablet 5 milliGRAM(s) Oral <User Schedule>    MEDICATIONS  (PRN):  acetaminophen     Tablet .. 650 milliGRAM(s) Oral every 6 hours PRN Moderate Pain (4 - 6)  aluminum hydroxide/magnesium hydroxide/simethicone Suspension 30 milliLiter(s) Oral every 4 hours PRN Dyspepsia  OLANZapine 5 milliGRAM(s) Oral every 8 hours PRN Agitation  traZODone 50 milliGRAM(s) Oral at bedtime PRN Sleep

## 2023-04-12 NOTE — BH PATIENT PROFILE - HOME MEDICATIONS
enoxaparin , 40 milligram(s) injectable once a day  LORazepam 2 mg oral tablet , 1 tab(s) orally every 8 hours

## 2023-04-12 NOTE — BH INPATIENT PSYCHIATRY ASSESSMENT NOTE - NSTXSLFCREINTERMD_PSY_ALL_CORE
Patient to be started on standing management for Catatonia which will address is mobility and disorganization so he is able to perform his ADL's

## 2023-04-12 NOTE — BH INPATIENT PSYCHIATRY ASSESSMENT NOTE - NSBHCHARTREVIEWINVESTIGATE_PSY_A_CORE FT
MRI head: shows no acute or chronic abnormalities  CT Head: No acute or chronic changes  EE hrs vEEG shows not epileptifiorm activity

## 2023-04-12 NOTE — BH INPATIENT PSYCHIATRY ASSESSMENT NOTE - NSBHCHARTREVIEWVS_PSY_A_CORE FT
Vital Signs Last 24 Hrs  T(C): 36.4 (04-11-23 @ 23:39), Max: 37.1 (04-11-23 @ 13:38)  T(F): 97.5 (04-11-23 @ 23:39), Max: 98.7 (04-11-23 @ 13:38)  HR: 89 (04-12-23 @ 06:00) (83 - 109)  BP: 102/69 (04-12-23 @ 06:00) (102/69 - 138/93)  BP(mean): --  RR: 18 (04-12-23 @ 06:00) (18 - 18)  SpO2: 99% (04-12-23 @ 06:00) (97% - 99%)

## 2023-04-12 NOTE — BH INPATIENT PSYCHIATRY ASSESSMENT NOTE - NSBHPHYSICALEXAM_PSY_ALL_CORE
Patient was lying in bed with poor grooming and minimally able to participate in physical exam.  HEENT: No abnormalities appreciated  Respiration: Clear bilaterally on auscultation  Cardia: Sinus rhythm,S1 &S2 appreciated, no added sounds  Abdomen: Soft, no organomegaly on palpation, bowel sounds in all 4 quadrants

## 2023-04-12 NOTE — BH INPATIENT PSYCHIATRY ASSESSMENT NOTE - NSTXPSYCHOGOAL_PSY_ALL_CORE
Will report command hallucinations to staff
Will be able to report experiencing hallucinations to staff

## 2023-04-12 NOTE — BH INPATIENT PSYCHIATRY ASSESSMENT NOTE - NSBHCHARTREVIEWLAB_PSY_A_CORE FT
CBC: within normal limits  CRP: raised  CMP: Liver and renal function tests within normal limites  Urine toxicology negative

## 2023-04-12 NOTE — BH INPATIENT PSYCHIATRY ASSESSMENT NOTE - NSBHATTESTTYPEVISIT_PSY_A_CORE
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 Weaning sedation overnight, remained on vent. Flap checks wnl.   POD1: LEXIE overnight. AFVSS. Flap checks wnl. Weaned to TC.   POD2: LEXIE overnight. AFVSS. Flap checks wnl. NPO, tolerating TF.   POD3: No acute events, CAM boot delivered, working with PT, remains NPO, TF at goal  POD4: No acute events, remains NPO TF at goal, JPs removed by plastics  POD5: [employer] doppler signal intermittently functioning after removal of LAWRENCE, pin prick performed with healthy bleeding tissue, now performing q2h handheld doppler flap checks, otherwise doing well, remains NPO, tolerating tube feeds, pain controlled, edema improving but remains unable to close mouth with restriction of tongue movement and persistent oropharyngeal edema, unable to tolerate finger occlusion, unable to swallow or clear secretions, requires frequent oral cavity suctioning and hygiene.      Upon discharge patient will require NGT feeds upon discharge.  Given inability to swallow secretions and persistent edema, she is not a candidate for SLP evaluation at this time.  Patient must remain NPO until she is able to fully close mouth, swallow secretions, and mobility and function of tongue has returned.    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  It is unclear at this time how long she will require NGT foods, likely 1-2 months but duration will be based on progressive healing and frequent outpatient evaluation.  Mrs. Freeman will require osmolite 1.2 bolus feeds of 260 ml q4h with free water flushes of 215 q4h. 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 Weaning sedation overnight, remained on vent. Flap checks wnl.   POD1: LEXIE overnight. AFVSS. Flap checks wnl. Weaned to TC.   POD2: LEXIE overnight. AFVSS. Flap checks wnl. NPO, tolerating TF.   POD3: No acute events, CAM boot delivered, working with PT, remains NPO, TF at goal  POD4: No acute events, remains NPO TF at goal, JPs removed by plastics  POD5: [employer] doppler signal intermittently functioning after removal of LAWRENCE, pin prick performed with healthy bleeding tissue, now performing q2h handheld doppler flap checks, otherwise doing well, remains NPO, tolerating tube feeds, pain controlled, edema improving but remains unable to close mouth with restriction of tongue movement and persistent oropharyngeal edema, unable to tolerate finger occlusion, unable to swallow or clear secretions, requires frequent oral cavity suctioning and hygiene.      Upon discharge patient will require NGT feeds upon discharge.  Given inability to swallow secretions and persistent edema, she is not a candidate for SLP evaluation at this time.  Patient must remain NPO until she is able to fully close mouth, swallow secretions, and mobility and function of tongue has returned.    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  It is unclear at this time how long she will require NGT foods, likely 2 weeks but duration will be based on progressive healing and frequent outpatient evaluation.  Mrs. Freeman will require osmolite 1.2 bolus feeds of 260 ml q4h with free water flushes of 215 q4h. 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 Weaning sedation overnight, remained on vent. Flap checks wnl.   POD1: LEXIE overnight. AFVSS. Flap checks wnl. Weaned to TC.   POD2: LEXIE overnight. AFVSS. Flap checks wnl. NPO, tolerating TF.   POD3: No acute events, CAM boot delivered, working with PT, remains NPO, TF at goal  POD4: No acute events, remains NPO TF at goal, JPs removed by plastics  POD5: [employer] doppler signal intermittently functioning after removal of LAWRENCE, pin prick performed with healthy bleeding tissue, now performing q2h handheld doppler flap checks, otherwise doing well, remains NPO, tolerating tube feeds, pain controlled, edema improving but remains unable to close mouth with restriction of tongue movement and persistent oropharyngeal edema, unable to tolerate finger occlusion, unable to swallow or clear secretions, requires frequent oral cavity suctioning and hygiene.   POD6: tracheostomy tube changed to 6-0 cuffless and patient evaluated for PMV, wound vac removed, feeds converted to bolus feeds    Pt will need enteral feeds via nasogastric tube for total of 14 days as she is s/p mandibulectomy with fibula free flap reconstruction and needs adequate amount of time for intraoral incisions to heal post operatively prior to taking in any PO intake.     Pt cannot eat during this time and will be NPO.     Will need Osmolite 1.2 7 cans/day with Prostat supplement once a day. Jevity should be given via gravity feed or bolus feed via NGT.     Will need feeds for 14 days.     Page ENT with any questions at [phone]    Nasogastric Tube Instructions for Home:     1. Feeding Schedule/Administration-  - Pt should receive Osmolite 1.2 7 cans per day via gravity feeds or bolus feeds  - Pt will have gravity bags and pole delivered via [facility]   - Pt will need one week total of NGT feeds  - Gravity feed should be done 4 times a day. 2 cans in the morning, 2 cans in the early afternoon, 2 cans in the evening, and 1 can at least 2 hrs prior to bed.  - Tube should be flushed well with 20-30 cc of water before and after each feed to prevent clogging.   - Pt/family will be taught feeding administration prior to discharge     2. Free Water Schedule/Administration-  - Pt should receive 215 cc of free water boluses q 4 hrs (6 x a day)  - Can be given with meals or with medications or on its own depending on patient preference.   - Free water can be given via bolus syringe or gravity bag.     3. Medication Administration-   - Pt will be sent home with pain medications and home medications to be given via the NGT  - All medications should be finely crushed using pill  or other method to make fine powder and mixed with 30-60 cc of water so that pills are mixed into a thin liquid.  - DO NOT mix medications such as tums/lactobacillus/protonix with other medications as this will create a thick paste and clog the tube.   -Thick or sticky medications should be given individually and the tube should be flushed well between each medication.    - Prior to medication administration NGT should be flushed well with 30 cc of water  - After each medication NGT should again be flushed with 30 cc of water before giving additional medications or feeds.     4. Daily Assessments/Tasks-  - Evaluate naris to look for any pressure ulcers  - Check the NGT markings to look at the length at which the NGT is out of the nose. Each NGT has markings and the NGT should stay the same distance out of the naris the entire time it is in place.   - Help patient/family with medication administration.     5. Troubleshooting-  NGT dislodged or out of place:   - Pt is being sent home with NGT that is sutured in place. The suture wraps around the NGT and goes through the nasal septum to secure the tube in place and prevent the tube from being accidently pulled out of place.   - In addition NGT is taped to nose to further secure the tube and prevent pulling on the tube when manipulated for feeds/medications  - IF NGT comes out of the nose: During the week during business hours patient should call Dr. Block's office and report to the office to have the tube re-inserted.   - IF NGT moves or becomes dislodged: NGT have markings along the tube, NGT should remain at the same jeyson for example say at 60 throughout its use. Should NGT move further out of the nose and be in only to 50 jeyson Dr. Block should be called, his office with either instruct home RN to remove NGT or have patient come to the office for re-adjustment.   - IF NGT is dislodged or accidently pulled out at night or during the weekends: If pt has had adequate nutrition/hydration throughout the day wait until next morning and call office to have NGT replaced OR report to [facility] emergency room to have NGT replaced. At no time should patient take in PO intake so if thirsty/hungry/needing medications and it is non-office our then report to emergency room.     NGT clogged:   - Flushing the tube well before and after medication administration and before and after feeds should prevent tube from clogging.   - IF the NGT is clogged medications/feeds will no longer pass easily into the tube.   - IF tube gets clogged home RN can attempt to unclog tube using soda such as ginger ale/coke to dissolve the substance clogging the tube. If this is unsuccessful pt should call Dr. Block's office to make appointment to have NGT re-adjusted.       Page ENT with any questions about above instructions at [phone].     Dr. Gilberto Block can be reached for any outpatient questions at [phone].   His office is located at [address] [place] [address] [address], [place] [address]. 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 Weaning sedation overnight, remained on vent. Flap checks wnl.   POD1: LEIXE overnight. AFVSS. Flap checks wnl. Weaned to TC.   POD2: LEXIE overnight. AFVSS. Flap checks wnl. NPO, tolerating TF.   POD3: No acute events, CAM boot delivered, working with PT, remains NPO, TF at goal  POD4: No acute events, remains NPO TF at goal, JPs removed by plastics  POD5: [employer] doppler signal intermittently functioning after removal of LAWRENCE, pin prick performed with healthy bleeding tissue, now performing q2h handheld doppler flap checks, otherwise doing well, remains NPO, tolerating tube feeds, pain controlled, edema improving but remains unable to close mouth with restriction of tongue movement and persistent oropharyngeal edema, unable to tolerate finger occlusion, unable to swallow or clear secretions, requires frequent oral cavity suctioning and hygiene.   POD6: tracheostomy tube changed to 6-0 cuffless and patient evaluated for PMV, wound vac removed, feeds converted to bolus feeds    Pt will need enteral feeds via nasogastric tube for total of 14 days as she is s/p mandibulectomy with fibula free flap reconstruction and needs adequate amount of time for intraoral incisions to heal post operatively prior to taking in any PO intake.     Pt cannot eat during this time and will be NPO.     Will need Osmolite 1.2 7 cans/day with Prostat supplement once a day. Osmolite should be given via gravity feed or bolus feed via NGT.     Will need feeds for 14 days.     Page ENT with any questions at [phone]    NGT placement was confirmed prior to intiation of feeds via CXR:    EXAM:  XR CHEST 1 VIEW PORT URGENT                          PROCEDURE DATE:  07/27/2017                     INTERPRETATION:  Chest x-ray    Indication: Gastric tube placement    A portable frontal view of the chest is compared to the prior study of   the same day. Gastric tube is in satisfactory position. Tracheostomy is   unchanged. Stable heart size. No new infiltrates. No large effusion or   pneumothorax.      IMPRESSION: Gastric tube in satisfactory position.      Actual images may be visualized on Synapse.  NGT is secured to nasal septum and positioning marked at location where dophoff exits naris.      Nasogastric Tube Instructions for Home:     1. Feeding Schedule/Administration-  - Pt should receive Osmolite 1.2 7 cans per day via gravity feeds or bolus feeds  - Pt will have gravity bags and pole delivered via [facility]   - Pt will need one week total of NGT feeds  - Gravity feed should be done 4 times a day. 2 cans in the morning, 2 cans in the early afternoon, 2 cans in the evening, and 1 can at least 2 hrs prior to bed.  - Tube should be flushed well with 20-30 cc of water before and after each feed to prevent clogging.   - Pt/family will be taught feeding administration prior to discharge     2. Free Water Schedule/Administration-  - Pt should receive 215 cc of free water boluses q 4 hrs (6 x a day)  - Can be given with meals or with medications or on its own depending on patient preference.   - Free water can be given via bolus syringe or gravity bag.     3. Medication Administration-   - Pt will be sent home with pain medications and home medications to be given via the NGT  - All medications should be finely crushed using pill  or other method to make fine powder and mixed with 30-60 cc of water so that pills are mixed into a thin liquid.  - DO NOT mix medications such as tums/lactobacillus/protonix with other medications as this will create a thick paste and clog the tube.   -Thick or sticky medications should be given individually and the tube should be flushed well between each medication.    - Prior to medication administration NGT should be flushed well with 30 cc of water  - After each medication NGT should again be flushed with 30 cc of water before giving additional medications or feeds.     4. Daily Assessments/Tasks-  - Evaluate naris to look for any pressure ulcers  - Check the NGT markings to look at the length at which the NGT is out of the nose. Each NGT has markings and the NGT should stay the same distance out of the naris the entire time it is in place.   Tubing marked where NGT should lay flush with nasal naris to ensure proper positioning.  - Help patient/family with medication administration.     5. Troubleshooting-  NGT dislodged or out of place:   - Pt is being sent home with NGT that is sutured in place. The suture wraps around the NGT and goes through the nasal septum to secure the tube in place and prevent the tube from being accidently pulled out of place.   - In addition NGT is taped to nose to further secure the tube and prevent pulling on the tube when manipulated for feeds/medications  - IF NGT comes out of the nose: During the week during business hours patient should call Dr. Block's office and report to the office to have the tube re-inserted.   - IF NGT moves or becomes dislodged: NGT have markings along the tube, NGT should remain at the same jeyson for example say at 60 throughout its use. Should NGT move further out of the nose and be in only to 50 jeyson Dr. Block should be called, his office with either instruct home RN to remove NGT or have patient come to the office for re-adjustment.   - IF NGT is dislodged or accidently pulled out at night or during the weekends: If pt has had adequate nutrition/hydration throughout the day wait until next morning and call office to have NGT replaced OR report to [facility] emergency room to have NGT replaced. At no time should patient take in PO intake so if thirsty/hungry/needing medications and it is non-office our then report to emergency room.     NGT clogged:   - Flushing the tube well before and after medication administration and before and after feeds should prevent tube from clogging.   - IF the NGT is clogged medications/feeds will no longer pass easily into the tube.   - IF tube gets clogged home RN can attempt to unclog tube using soda such as ginger ale/coke to dissolve the substance clogging the tube. If this is unsuccessful pt should call Dr. Block's office to make appointment to have NGT re-adjusted.       Page E[facility] with any questions about above instructions at [phone].     Dr. Gilberto Block can be reached for any outpatient questions at [phone].   His office is located at [facility] [address] [facility] [address], [place] [address]. 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 Weaning sedation overnight, remained on vent. Flap checks wnl.   POD1: LEXIE overnight. AFVSS. Flap checks wnl. Weaned to TC.   POD2: LEXIE overnight. AFVSS. Flap checks wnl. NPO, tolerating TF.   POD3: No acute events, CAM boot delivered, working with PT, remains NPO, TF at goal  POD4: No acute events, remains NPO TF at goal, JPs removed by plastics  POD5: [employer] doppler signal intermittently functioning after removal of LAWRENCE, pin prick performed with healthy bleeding tissue, now performing q2h handheld doppler flap checks, otherwise doing well, remains NPO, tolerating tube feeds, pain controlled, edema improving but remains unable to close mouth with restriction of tongue movement and persistent oropharyngeal edema, unable to tolerate finger occlusion, unable to swallow or clear secretions, requires frequent oral cavity suctioning and hygiene.   POD6: tracheostomy tube changed to 6-0 cuffless and patient evaluated for PMV, wound vac removed, feeds converted to bolus feeds  POD7: evaluated for PMV, tolerates for short periods of time, wound vac removed, ambulating daily with CAM boot  POD8: patient capped in pm yesterday and overnight without issues, decannulated this am, deemed stable for DC home with NGT feeds    Pt will need enteral feeds via nasogastric tube for total of 14 days as she is s/p mandibulectomy with fibula free flap reconstruction and needs adequate amount of time for intraoral incisions to heal post operatively prior to taking in any PO intake.     Pt cannot eat during this time and will be NPO.     Will need Osmolite 1.2 7 cans/day with Prostat supplement once a day. Osmolite should be given via gravity feed or bolus feed via NGT.     Will need feeds for 14 days.     Page ENT with any questions at [phone]    NGT placement confirmed prior to discharge:    INTERPRETATION: CLINICAL INFORMATION: NGT Placement. TECHNIQUE: A frontal view of the chest is dated 8/2/2017 3:09 PM. COMPARISON: 7/27/2017 FINDINGS: The lungs are clear. There is no focal consolidation, pneumothorax, or pleural effusion. Although evaluation is limited on AP view, the cardiac silhouette appears normal in size. Tracheostomy tube is unchanged as is an enteric tube coursing below the diaphragm with tip excluded from the study. IMPRESSION: No focal infiltrates. Lines and tubes as stated. "Thank you for the opportunity to participate in the care of this patient." ROSA ONTIVEROS MBA-CIARA EVERETT, ATTENDING RADIOLOGIST This document has been electronically signed. Aug 2 2017 3:27PM     Actual images may be visualized on Synapse.  NGT is secured to nasal septum and positioning marked at location where dophoff exits naris.      Nasogastric Tube Instructions for Home:     1. Feeding Schedule/Administration-  - Pt should receive Osmolite 1.2 7 cans per day via gravity feeds or bolus feeds  - Pt will have gravity bags and pole delivered via [facility]   - Pt will need one week total of NGT feeds  - Gravity feed should be done 4 times a day. 2 cans in the morning, 2 cans in the early afternoon, 2 cans in the evening, and 1 can at least 2 hrs prior to bed.  - Tube should be flushed well with 20-30 cc of water before and after each feed to prevent clogging.   - Pt/family will be taught feeding administration prior to discharge     2. Free Water Schedule/Administration-  - Pt should receive 215 cc of free water boluses q 4 hrs (6 x a day)  - Can be given with meals or with medications or on its own depending on patient preference.   - Free water can be given via bolus syringe or gravity bag.     3. Medication Administration-   - Pt will be sent home with pain medications and home medications to be given via the NGT  - All medications should be finely crushed using pill  or other method to make fine powder and mixed with 30-60 cc of water so that pills are mixed into a thin liquid.  - DO NOT mix medications such as tums/lactobacillus/protonix with other medications as this will create a thick paste and clog the tube.   -Thick or sticky medications should be given individually and the tube should be flushed well between each medication.    - Prior to medication administration NGT should be flushed well with 30 cc of water  - After each medication NGT should again be flushed with 30 cc of water before giving additional medications or feeds.     4. Daily Assessments/Tasks-  - Evaluate naris to look for any pressure ulcers  - Check the NGT markings to look at the length at which the NGT is out of the nose. Each NGT has markings and the NGT should stay the same distance out of the naris the entire time it is in place.   Tubing marked where NGT should lay flush with nasal naris to ensure proper positioning.  - Help patient/family with medication administration.     5. Troubleshooting-  NGT dislodged or out of place:   - Pt is being sent home with NGT that is sutured in place. The suture wraps around the NGT and goes through the nasal septum to secure the tube in place and prevent the tube from being accidently pulled out of place.   - In addition NGT is taped to nose to further secure the tube and prevent pulling on the tube when manipulated for feeds/medications  - IF NGT comes out of the nose: During the week during business hours patient should call Dr. Block's office and report to the office to have the tube re-inserted.   - IF NGT moves or becomes dislodged: NGT have markings along the tube, NGT should remain at the same jeyson for example say at 60 throughout its use. Should NGT move further out of the nose and be in only to 50 jeyson Dr. Block should be called, his office with either instruct home RN to remove NGT or have patient come to the office for re-adjustment.   - IF NGT is dislodged or accidently pulled out at night or during the weekends: If pt has had adequate nutrition/hydration throughout the day wait until next morning and call office to have NGT replaced OR report to [facility] emergency room to have NGT replaced. At no time should patient take in PO intake so if thirsty/hungry/needing medications and it is non-office our then report to emergency room.     NGT clogged:   - Flushing the tube well before and after medication administration and before and after feeds should prevent tube from clogging.   - IF the NGT is clogged medications/feeds will no longer pass easily into the tube.   - IF tube gets clogged home RN can attempt to unclog tube using soda such as ginger ale/coke to dissolve the substance clogging the tube. If this is unsuccessful pt should call Dr. Block's office to make appointment to have NGT re-adjusted.       Page E[facility] with any questions about above instructions at [phone].     Dr. Gilberto Block can be reached for any outpatient questions at [phone].   His office is located at [address] [place] [address] [address], [place] [id number]. 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 Weaning sedation overnight, remained on vent. Flap checks wnl.   POD1: LEXIE overnight. AFVSS. Flap checks wnl. Weaned to TC.   POD2: LEXIE overnight. AFVSS. Flap checks wnl. NPO, tolerating TF.   POD3: No acute events, CAM boot delivered, working with PT, remains NPO, TF at goal  POD4: No acute events, remains NPO TF at goal, JPs removed by plastics  POD5: [employer] doppler signal intermittently functioning after removal of LAWRENCE, pin prick performed with healthy bleeding tissue, now performing q2h handheld doppler flap checks, otherwise doing well, remains NPO, tolerating tube feeds, pain controlled, edema improving but remains unable to close mouth with restriction of tongue movement and persistent oropharyngeal edema, unable to tolerate finger occlusion, unable to swallow or clear secretions, requires frequent oral cavity suctioning and hygiene.   POD6: tracheostomy tube changed to 6-0 cuffless and patient evaluated for PMV, wound vac removed, feeds converted to bolus feeds  POD7: evaluated for PMV, tolerates for short periods of time, wound vac removed, ambulating daily with CAM boot  POD8: patient capped in pm yesterday and overnight without issues, decannulated this am, deemed stable for DC home with NGT feeds  8/3: decannulated with no issues. Pt is to go home with NGT and tube feeds. Litmus paper made available to pt as requested.     Pt will need enteral feeds via nasogastric tube for total of 14 days as she is s/p mandibulectomy with fibula free flap reconstruction and needs adequate amount of time for intraoral incisions to heal post operatively prior to taking in any PO intake.     Pt cannot eat during this time and will be NPO.     Will need Osmolite 1.2 7 cans/day with Prostat supplement once a day. Osmolite should be given via gravity feed or bolus feed via NGT.     Will need feeds for 14 days.     [facility] with any questions at [phone]    NGT placement confirmed prior to discharge:    INTERPRETATION: CLINICAL INFORMATION: NGT Placement. TECHNIQUE: A frontal view of the chest is dated 8/2/2017 3:09 PM. COMPARISON: 7/27/2017 FINDINGS: The lungs are clear. There is no focal consolidation, pneumothorax, or pleural effusion. Although evaluation is limited on AP view, the cardiac silhouette appears normal in size. Tracheostomy tube is unchanged as is an enteric tube coursing below the diaphragm with tip excluded from the study. IMPRESSION: No focal infiltrates. Lines and tubes as stated. "Thank you for the opportunity to participate in the care of this patient." ROSA ONTIVEROS MBA-CIARA EVERETT, ATTENDING RADIOLOGIST This document has been electronically signed. Aug 2 2017 3:27PM     Actual images may be visualized on Synapse.  NGT is secured to nasal septum and positioning marked at location where dophoff exits naris.      Nasogastric Tube Instructions for Home:     1. Feeding Schedule/Administration-  - Pt should receive Osmolite 1.2 7 cans per day via gravity feeds or bolus feeds  - Pt will have gravity bags and pole delivered via [facility]   - Pt will need one week total of NGT feeds  - Gravity feed should be done 4 times a day. 2 cans in the morning, 2 cans in the early afternoon, 2 cans in the evening, and 1 can at least 2 hrs prior to bed.  - Tube should be flushed well with 20-30 cc of water before and after each feed to prevent clogging.   - Pt/family will be taught feeding administration prior to discharge     2. Free Water Schedule/Administration-  - Pt should receive 215 cc of free water boluses q 4 hrs (6 x a day)  - Can be given with meals or with medications or on its own depending on patient preference.   - Free water can be given via bolus syringe or gravity bag.     3. Medication Administration-   - Pt will be sent home with pain medications and home medications to be given via the NGT  - All medications should be finely crushed using pill  or other method to make fine powder and mixed with 30-60 cc of water so that pills are mixed into a thin liquid.  - DO NOT mix medications such as tums/lactobacillus/protonix with other medications as this will create a thick paste and clog the tube.   -Thick or sticky medications should be given individually and the tube should be flushed well between each medication.    - Prior to medication administration NGT should be flushed well with 30 cc of water  - After each medication NGT should again be flushed with 30 cc of water before giving additional medications or feeds.     4. Daily Assessments/Tasks-  - Evaluate naris to look for any pressure ulcers  - Check the NGT markings to look at the length at which the NGT is out of the nose. Each NGT has markings and the NGT should stay the same distance out of the naris the entire time it is in place.   Tubing marked where NGT should lay flush with nasal naris to ensure proper positioning.  - Help patient/family with medication administration.     5. Troubleshooting-  NGT dislodged or out of place:   - Pt is being sent home with NGT that is sutured in place. The suture wraps around the NGT and goes through the nasal septum to secure the tube in place and prevent the tube from being accidently pulled out of place.   - In addition NGT is taped to nose to further secure the tube and prevent pulling on the tube when manipulated for feeds/medications  - IF NGT comes out of the nose: During the week during business hours patient should call Dr. Block's office and report to the office to have the tube re-inserted.   - IF NGT moves or becomes dislodged: NGT have markings along the tube, NGT should remain at the same jeyson for example say at 60 throughout its use. Should NGT move further out of the nose and be in only to 50 jeyson Dr. Block should be called, his office with either instruct home RN to remove NGT or have patient come to the office for re-adjustment.   - IF NGT is dislodged or accidently pulled out at night or during the weekends: If pt has had adequate nutrition/hydration throughout the day wait until next morning and call office to have NGT replaced OR report to [facility] emergency room to have NGT replaced. At no time should patient take in PO intake so if thirsty/hungry/needing medications and it is non-office our then report to emergency room.     NGT clogged:   - Flushing the tube well before and after medication administration and before and after feeds should prevent tube from clogging.   - IF the NGT is clogged medications/feeds will no longer pass easily into the tube.   - IF tube gets clogged home RN can attempt to unclog tube using soda such as ginger ale/coke to dissolve the substance clogging the tube. If this is unsuccessful pt should call Dr. Block's office to make appointment to have NGT re-adjusted.       [facility] with any questions about above instructions at [phone].     Dr. Gilberto Block can be reached for any outpatient questions at [phone].   His office is located at [address] [place] [address] [address], [place] [id number]. 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 Weaning sedation overnight, remained on vent. Flap checks wnl.   POD1: LEXIE overnight. AFVSS. Flap checks wnl. Weaned to TC.   POD2: LEXIE overnight. AFVSS. Flap checks wnl. NPO, tolerating TF.   POD3: No acute events, CAM boot delivered, working with PT, remains NPO, TF at goal  POD4: No acute events, remains NPO TF at goal, JPs removed by plastics  POD5: [email or web address] doppler signal intermittently functioning after removal of LAWRENCE, pin prick performed with healthy bleeding tissue, now performing q2h handheld doppler flap checks, otherwise doing well, remains NPO, tolerating tube feeds, pain controlled, edema improving but remains unable to close mouth with restriction of tongue movement and persistent oropharyngeal edema, unable to tolerate finger occlusion, unable to swallow or clear secretions, requires frequent oral cavity suctioning and hygiene.   POD6: tracheostomy tube changed to 6-0 cuffless and patient evaluated for PMV, wound vac removed, feeds converted to bolus feeds  POD7: evaluated for PMV, tolerates for short periods of time, wound vac removed, ambulating daily with CAM boot  POD8: patient capped in pm yesterday and overnight without issues, decannulated this am, deemed stable for DC home with NGT feeds  8/3: decannulated with no issues. Pt is to go home with NGT and tube feeds. Litmus paper made available to pt as requested.     Pt will need enteral feeds via nasogastric tube for total of 14 days as she is s/p mandibulectomy with fibula free flap reconstruction and needs adequate amount of time for intraoral incisions to heal post operatively prior to taking in any PO intake.     Pt cannot eat during this time and will be NPO.     Will need Osmolite 1.2 7 cans/day with Prostat supplement once a day. Osmolite should be given via gravity feed or bolus feed via NGT.     Will need feeds for 14 days.     [facility] with any questions at [phone]    NGT placement confirmed prior to discharge:    INTERPRETATION: CLINICAL INFORMATION: NGT Placement. TECHNIQUE: A frontal view of the chest is dated 8/2/2017 3:09 PM. COMPARISON: 7/27/2017 FINDINGS: The lungs are clear. There is no focal consolidation, pneumothorax, or pleural effusion. Although evaluation is limited on AP view, the cardiac silhouette appears normal in size. Tracheostomy tube is unchanged as is an enteric tube coursing below the diaphragm with tip excluded from the study. IMPRESSION: No focal infiltrates. Lines and tubes as stated. "Thank you for the opportunity to participate in the care of this patient." ROSA ONTIVEROS MBA-CIARA EVERETT, ATTENDING RADIOLOGIST This document has been electronically signed. Aug 2 2017 3:27PM     Actual images may be visualized on Synapse.  NGT is secured to nasal septum and positioning marked at location where dophoff exits naris.      Nasogastric Tube Instructions for Home:     1. Feeding Schedule/Administration-  - Pt should receive Osmolite 1.2 6 cans per day via gravity feeds or bolus feeds  - Pt will have gravity bags and pole delivered via [facility]   - Pt will need one week total of NGT feeds  - Gravity feed should be done 4 times a day. 1.5 cans in the morning, 1.5 cans in the early afternoon, 2 cans in the evening, and 1 can at least 2 hrs prior to bed.  - Tube should be flushed well with 20-30 cc of water before and after each feed to prevent clogging.   - Pt/family will be taught feeding administration prior to discharge     2. Free Water Schedule/Administration-  - Pt should receive 215 cc of free water boluses q 4 hrs (6 x a day)  - Can be given with meals or with medications or on its own depending on patient preference.   - Free water can be given via bolus syringe or gravity bag.     3. Medication Administration-   - Pt will be sent home with pain medications and home medications to be given via the NGT  - All medications should be finely crushed using pill  or other method to make fine powder and mixed with 30-60 cc of water so that pills are mixed into a thin liquid.  - DO NOT mix medications such as tums/lactobacillus/protonix with other medications as this will create a thick paste and clog the tube.   -Thick or sticky medications should be given individually and the tube should be flushed well between each medication.    - Prior to medication administration NGT should be flushed well with 30 cc of water  - After each medication NGT should again be flushed with 30 cc of water before giving additional medications or feeds.     4. Daily Assessments/Tasks-  - Evaluate naris to look for any pressure ulcers  - Check the NGT markings to look at the length at which the NGT is out of the nose. Each NGT has markings and the NGT should stay the same distance out of the naris the entire time it is in place.   Tubing marked where NGT should lay flush with nasal naris to ensure proper positioning.  - Help patient/family with medication administration.     5. Troubleshooting-  NGT dislodged or out of place:   - Pt is being sent home with NGT that is sutured in place. The suture wraps around the NGT and goes through the nasal septum to secure the tube in place and prevent the tube from being accidently pulled out of place.   - In addition NGT is taped to nose to further secure the tube and prevent pulling on the tube when manipulated for feeds/medications  - IF NGT comes out of the nose: During the week during business hours patient should call Dr. Block's office and report to the office to have the tube re-inserted.   - IF NGT moves or becomes dislodged: NGT have markings along the tube, NGT should remain at the same jeyson for example say at 60 throughout its use. Should NGT move further out of the nose and be in only to 50 jeyson Dr. Block should be called, his office with either instruct home RN to remove NGT or have patient come to the office for re-adjustment.   - IF NGT is dislodged or accidently pulled out at night or during the weekends: If pt has had adequate nutrition/hydration throughout the day wait until next morning and call office to have NGT replaced OR report to [facility] emergency room to have NGT replaced. At no time should patient take in PO intake so if thirsty/hungry/needing medications and it is non-office our then report to emergency room.     NGT clogged:   - Flushing the tube well before and after medication administration and before and after feeds should prevent tube from clogging.   - IF the NGT is clogged medications/feeds will no longer pass easily into the tube.   - IF tube gets clogged home RN can attempt to unclog tube using soda such as ginger ale/coke to dissolve the substance clogging the tube. If this is unsuccessful pt should call Dr. Block's office to make appointment to have NGT re-adjusted.       [facility] with any questions about above instructions at [phone].     Dr. Gilberto Block can be reached for any outpatient questions at [phone].   His office is located at [address] [place] [address] [address], [place] [address]. Attending with Resident/Fellow/Student

## 2023-04-12 NOTE — BH INPATIENT PSYCHIATRY ASSESSMENT NOTE - CURRENT MEDICATION
MEDICATIONS  (STANDING):    MEDICATIONS  (PRN):  acetaminophen     Tablet .. 650 milliGRAM(s) Oral every 6 hours PRN Moderate Pain (4 - 6)  aluminum hydroxide/magnesium hydroxide/simethicone Suspension 30 milliLiter(s) Oral every 4 hours PRN Dyspepsia   MEDICATIONS  (STANDING):  LORazepam   Injectable 2 milliGRAM(s) IntraMuscular <User Schedule>  OLANZapine Disintegrating Tablet 5 milliGRAM(s) Oral <User Schedule>    MEDICATIONS  (PRN):  acetaminophen     Tablet .. 650 milliGRAM(s) Oral every 6 hours PRN Moderate Pain (4 - 6)  aluminum hydroxide/magnesium hydroxide/simethicone Suspension 30 milliLiter(s) Oral every 4 hours PRN Dyspepsia  OLANZapine 5 milliGRAM(s) Oral every 8 hours PRN Agitation  traZODone 50 milliGRAM(s) Oral at bedtime PRN Sleep

## 2023-04-12 NOTE — BH PATIENT PROFILE - HOSPITALS/PSYCHIATRIC FACILITIES
Partially impaired: cannot see medication labels or newsprint, but can see obstacles in path, and the surrounding layout; can count fingers at arm's length [facility]

## 2023-04-12 NOTE — BH INPATIENT PSYCHIATRY ASSESSMENT NOTE - HPI (INCLUDE ILLNESS QUALITY, SEVERITY, DURATION, TIMING, CONTEXT, MODIFYING FACTORS, ASSOCIATED SIGNS AND SYMPTOMS)
27 year old male from [facility] presents with low motor activity, rigidity, hypertonia, and delayed responses to questions about why he is in the hospital - i.e., responded with a deep sigh, and searching eyes when questioned about this by us (in his native language, using  services). At times, he responded to questions involubly, and his speech was hard to make out at the bedside - the  said some responses were gibberish. Lack of response to questions about paranoia or auditory hallucinations. Responses were limited to one or a few words, and appropriate to the topic. While on the phone with the , he asked about his family in [facility] - there was perseveration on the topic of his family.  He shared the name of his wife, and details about his cross tattoo, which he said he got with his wife. He tracked individuals in the room with his eyes and cooperated with physical exam maneuvers. Patient did not complain of pain. Eye contact was poor at times, decreased blinking rate, and speech was delayed. His facial expression reflected grimacing and occasional face or leg rubbing or holding was observed. Postures were held for extended periods of time (minute or more) and it was difficult for him to relax from a sitting position to lying back in the bed. Waxy flexibility, excitement, combativeness, impulsivity were absent. There were no autonomic abnormalities. Patient interviewed with assistance of  line as patient is predominately Bhutanese speaking. Writer has previously assessed patient on consult services and part of the information in this note is taken from my consult note.  He is a 27 year old man from [facility] presents with low motor activity, rigidity, hypertonia, and delayed responses to questions about why he is in the hospital - i.e., responded with a deep sigh, and searching eyes when questioned about this by us (in his native language, using  services). At times, he responded to questions involubly, and his speech was hard to make out at the bedside - the  said some responses were gibberish. Lack of response to questions about paranoia or auditory hallucinations. Responses were limited to one or a few words, and appropriate to the topic. While on the phone with the , he asked about his family in [facility] - there was perseveration on the topic of his family.  He shared the name of his wife, and details about his cross tattoo, which he said he got with his wife. He tracked individuals in the room with his eyes and cooperated with physical exam maneuvers. Patient did not complain of pain. Eye contact was poor at times, decreased blinking rate, and speech was delayed. His facial expression reflected grimacing and occasional face or leg rubbing or holding was observed. Postures were held for extended periods of time (minute or more) and it was difficult for him to relax from a sitting position to lying back in the bed. Waxy flexibility, excitement, combativeness, impulsivity were absent. There were no autonomic abnormalities.  Per my conversation with Brother Garcia Mukherjeeindia ([phone]) on 04.07.23: Patient had reported to brother that he had been feeling "down or low" for the last 1 year with increase in isolative and guarded behavior. The brother blames much of the behavior changes on the father's drinking and states

## 2023-04-12 NOTE — BH INPATIENT PSYCHIATRY ASSESSMENT NOTE - DESCRIPTION
He was born and raised in [facility]. He is  and his wife is back in [facility], while he lives [place] the [place] with his parents and brothers. He works part time in construction and as a super attendant.

## 2023-04-12 NOTE — BH INPATIENT PSYCHIATRY ASSESSMENT NOTE - OTHER
Stable Unable to cooperate due to symptoms Increased tone Denies but easily distractable Poverty of content

## 2023-04-12 NOTE — BH INPATIENT PSYCHIATRY ASSESSMENT NOTE - HPI (INCLUDE ILLNESS QUALITY, SEVERITY, DURATION, TIMING, CONTEXT, MODIFYING FACTORS, ASSOCIATED SIGNS AND SYMPTOMS)
Patient interviewed with assistance of  line as patient is predominately Israeli speaking. Writer has previously assessed patient on consult services and part of the information in this note is taken from my consult note.  He is a 27 year old man from [facility] presents with low motor activity, rigidity, hypertonia, and delayed responses to questions about why he is in the hospital - i.e., responded with a deep sigh, and searching eyes when questioned about this by us (in his native language, using  services). At times, he responded to questions involubly, and his speech was hard to make out at the bedside - the  said some responses were gibberish. Lack of response to questions about paranoia or auditory hallucinations. Responses were limited to one or a few words, and appropriate to the topic. While on the phone with the , he asked about his family in [facility] - there was perseveration on the topic of his family.  He shared the name of his wife, and details about his cross tattoo, which he said he got with his wife. He tracked individuals in the room with his eyes and cooperated with physical exam maneuvers. Patient did not complain of pain. Eye contact was poor at times, decreased blinking rate, and speech was delayed. His facial expression reflected grimacing and occasional face or leg rubbing or holding was observed. Postures were held for extended periods of time (minute or more) and it was difficult for him to relax from a sitting position to lying back in the bed. Waxy flexibility, excitement, combativeness, impulsivity were absent. There were no autonomic abnormalities.  Per my conversation with Brother Garcia Mukherjeeindia ([phone]) on 04.07.23: Patient had reported to brother that he had been feeling "down or low" for the last 1 year with increase in isolative and guarded behavior. The brother blames much of the behavior changes on the father's drinking and states that, 'He (the father) causes him a lot of stress". Per brother, patient also "runs away from home" and "will spend the night at our other brother's home". He reports that the patient has been "secretive, not like big things but will keep things from us in the last year. he didn't use to do that before". In regards to safety, brother states, "He has never tried to hurt himself but he said to me, I don't know if I can live like this a few times" with the context being the fights (verbal) patient has at home with the father. Brother denies any past psychiatric history in patient or in family and reports that the patient kept up with the ADL's during the last year. The day before patient was brought to the hospital, he started to say, "People are following me or listening to me through the phone".    Patient seen 20-30 minutes after Ativan administration: He was sitting in the chair and looked up as entered the room. He was perseverative of getting his Brother Emmett's number and when I offered him Alesander's number he seemed to no recognize it. Of Note, he had managed to remember 5 digits of his brother Emmett's number but was missing the last few digits. He was also trying to write out the number and was moving more spontaneously. While his motor symptoms were noticeably improved, he appeared distractible and unable to maintain a linear conversation.

## 2023-04-13 LAB
ACETONE, GCMS SCREEN URN: SIGNIFICANT CHANGE UP
AMPA-R AB CBA, CSF: NEGATIVE — SIGNIFICANT CHANGE UP
AMPHIPHYSIN AB TITR CSF: NEGATIVE — SIGNIFICANT CHANGE UP
CASPR2-IGG CBA, CSF: NEGATIVE — SIGNIFICANT CHANGE UP
CULTURE RESULTS: SIGNIFICANT CHANGE UP
CV2 IGG TITR CSF: NEGATIVE — SIGNIFICANT CHANGE UP
DPPX ANTIBODY IFA, CSF: NEGATIVE — SIGNIFICANT CHANGE UP
GABA-B-R AB CBA, CSF: NEGATIVE — SIGNIFICANT CHANGE UP
GAD65 AB CSF-SCNC: 0 NMOL/L — SIGNIFICANT CHANGE UP
GFAP IFA, CSF: NEGATIVE — SIGNIFICANT CHANGE UP
GLIAL NUC TYPE 1 AB TITR CSF: NEGATIVE — SIGNIFICANT CHANGE UP
H CAPSUL AG CSF IA-MCNC: SIGNIFICANT CHANGE UP
HU1 AB TITR CSF IF: NEGATIVE — SIGNIFICANT CHANGE UP
HU2 AB TITR CSF IF: NEGATIVE — SIGNIFICANT CHANGE UP
HU3 AB TITR CSF: NEGATIVE — SIGNIFICANT CHANGE UP
IGLON5 IFA, CSF: NEGATIVE — SIGNIFICANT CHANGE UP
IMMUNOLOGIST REVIEW: SIGNIFICANT CHANGE UP
ISOPROPANOL GCMS: SIGNIFICANT CHANGE UP
LGI1-IGG CBA, CSF: NEGATIVE — SIGNIFICANT CHANGE UP
MGLUR1 AB IFA, CSF: NEGATIVE — SIGNIFICANT CHANGE UP
PCA-TR AB TITR CSF: NEGATIVE — SIGNIFICANT CHANGE UP
SPECIMEN SOURCE: SIGNIFICANT CHANGE UP

## 2023-04-13 PROCEDURE — 99232 SBSQ HOSP IP/OBS MODERATE 35: CPT

## 2023-04-13 RX ADMIN — Medication 2 MILLIGRAM(S): at 22:02

## 2023-04-13 RX ADMIN — Medication 2 MILLIGRAM(S): at 10:07

## 2023-04-13 RX ADMIN — OLANZAPINE 5 MILLIGRAM(S): 15 TABLET, FILM COATED ORAL at 21:29

## 2023-04-13 RX ADMIN — OLANZAPINE 5 MILLIGRAM(S): 15 TABLET, FILM COATED ORAL at 22:02

## 2023-04-13 NOTE — BH INPATIENT PSYCHIATRY PROGRESS NOTE - NSBHASSESSSUMMFT_PSY_ALL_CORE
27 year old Welsh speaking man, working part time in construction and as a super attendant,  (wife in [facility]) with no children, domiciled with family, with no known past psychiatric history or medical history, brought to the hospital by family due to bizarre behavior for 1 day. Patient was endorsing paranoid ideation and ideas of reference to family which precipitated his hospital visit.  Initially he was admitted to inpatient psychiatry on 04.05.23 but early in admission was found unresponsive on the floor, rapid response was called and patient was transferred to medicine on 04.06.23. He was admitted for medical work-up and suspected infection or seizure disorder from 04.06.23 to 04.11.23. His work up including blood work, urine toxicology, neuroimaging (MRI and CT) plus 24 hr vEEG was not indicative of an organic cause and patient was transferred back to psychiatry on 04.12.23. During his time on medication, he was followed by psychiatry consult from 04.07.23 to 04.11.23 with recommended treatment for Catatonia started which patient responded too.  On assessment today, he initially presented with Hernandez Mohsen Score of 17 with mutism, fixed gaze, negativism, rigidity, meitghen, withdrawal, ambitendency and perseveration. Despite use of , history taking was limited as patient would respond with nonsense words to certain questions as per  but other times would respond linearly. He was examined briefly post Ativan dose with improvement noted in rigidity, mutism and negativism but other symptoms remain unchanged. He was also noticed to be easily distractible and possibly responding to internal stimuli.  Based on assessment and history, it is unclear what the precipitating factors are at this time but perpetuating factors include conflict with family, being away from wife and long prodrome period. Predisposing factors include substance use in father and his protective factors are support from his brothers, being able to maintain employment, and quick response to psychopharmacological intervention. At this time, his primary diagnosis is Catatonia and with high suspicion for primary psychotic disorder but also ruling out MDD with psychotic features.    Plan:  1. Patient is not an imminent danger to self or other but unable to effectively manage ADL's so constant observation ongoing.  2. Psychopharmacological:  Standing Lorazepam 2 mg intramuscular at 9 am, 3 pm and 7 pm daily for Catatonia; to be titrated up or down based on response and possibly converted to oral once patient is more engaged  Start Olanzapine disintegrating tablet 5 mg nightly for psychotic features  3. Medical: HBA1c to be ordered as patient on standing psychotropic medications and PT consult ordered for ambulation concerns  4. Collateral: Attempted to call Brother Garcia [facility] [phone] but only able to leave voicemail today and will follow up.    ;;04/13: Mostly mute but makes unclear statements when using S # [id number] Welsh translation;  however after the interivew says "What's your name?" in clear English.  seen visiting with parents and appears more verbal and possibly eating food.  ON 1:1 because of disorganization and catatonia. As patient is consistently not compliant with medication needed to treat the patient's condition and the patient's symptoms remain severe and interfere with functioning, application is being made for treatment over objection  ; discussed with patient using  phone.  No comment by patient ; says "I take medication." (?)

## 2023-04-13 NOTE — DIETITIAN INITIAL EVALUATION ADULT - OTHER CALCULATIONS
Based on Standards of Care pt within % IBW (112%) thus actual body weight used for all calculations (178.6#).

## 2023-04-13 NOTE — BH INPATIENT PSYCHIATRY PROGRESS NOTE - OTHER
Unable to cooperate due to symptoms Increased tone Stable Denies but easily distractable Poverty of content

## 2023-04-13 NOTE — BH INPATIENT PSYCHIATRY PROGRESS NOTE - NSBHFUPINTERVALHXFT_PSY_A_CORE
131 ;;04/13: Mostly mute but makes unclear statements when using S # [id number] Uruguayan translation;  however after the interivew says "What's your name?" in clear English.  seen visiting with parents and appears more verbal and possibly eating food.  ON 1:1 because of disorganization and catatonia.

## 2023-04-13 NOTE — BH INPATIENT PSYCHIATRY PROGRESS NOTE - CURRENT MEDICATION
MEDICATIONS  (STANDING):  LORazepam   Injectable 2 milliGRAM(s) IntraMuscular <User Schedule>  OLANZapine Disintegrating Tablet 5 milliGRAM(s) Oral <User Schedule>    MEDICATIONS  (PRN):  acetaminophen     Tablet .. 650 milliGRAM(s) Oral every 6 hours PRN Moderate Pain (4 - 6)  aluminum hydroxide/magnesium hydroxide/simethicone Suspension 30 milliLiter(s) Oral every 4 hours PRN Dyspepsia  OLANZapine 5 milliGRAM(s) Oral every 8 hours PRN Agitation  traZODone 50 milliGRAM(s) Oral at bedtime PRN Sleep   MEDICATIONS  (STANDING):  LORazepam     Tablet 2 milliGRAM(s) Oral <User Schedule>  OLANZapine Disintegrating Tablet 5 milliGRAM(s) Oral <User Schedule>    MEDICATIONS  (PRN):  acetaminophen     Tablet .. 650 milliGRAM(s) Oral every 6 hours PRN Moderate Pain (4 - 6)  aluminum hydroxide/magnesium hydroxide/simethicone Suspension 30 milliLiter(s) Oral every 4 hours PRN Dyspepsia  OLANZapine 5 milliGRAM(s) Oral every 8 hours PRN Agitation  traZODone 50 milliGRAM(s) Oral at bedtime PRN Sleep

## 2023-04-13 NOTE — BH INPATIENT PSYCHIATRY PROGRESS NOTE - NSBHCHARTREVIEWVS_PSY_A_CORE FT
Vital Signs Last 24 Hrs  T(C): 36.9 (04-12-23 @ 17:20), Max: 36.9 (04-12-23 @ 17:20)  T(F): 98.4 (04-12-23 @ 17:20), Max: 98.4 (04-12-23 @ 17:20)  HR: 89 (04-12-23 @ 17:20) (89 - 89)  BP: 133/89 (04-12-23 @ 17:20) (133/89 - 133/89)  BP(mean): --  RR: 18 (04-12-23 @ 17:20) (18 - 18)  SpO2: 99% (04-12-23 @ 17:20) (99% - 99%)     Vital Signs Last 24 Hrs  T(C): 36.8 (04-13-23 @ 16:38), Max: 36.8 (04-13-23 @ 16:38)  T(F): 98.3 (04-13-23 @ 16:38), Max: 98.3 (04-13-23 @ 16:38)  HR: 89 (04-13-23 @ 16:38) (89 - 101)  BP: 142/99 (04-13-23 @ 16:38) (128/90 - 142/99)  BP(mean): --  RR: 17 (04-13-23 @ 16:38) (17 - 18)  SpO2: 99% (04-13-23 @ 16:38) (97% - 99%)

## 2023-04-13 NOTE — BH INPATIENT PSYCHIATRY PROGRESS NOTE - NSBHFUPINTERVALCCFT_PSY_A_CORE
Treated for catatonia and extreme withdrawal; unable to assess suicide potential as patient is mostly mute and cataonic at times.  Not believed to have made prior attempts .

## 2023-04-13 NOTE — BH INPATIENT PSYCHIATRY PROGRESS NOTE - NSBHMETABOLIC_PSY_ALL_CORE_FT
BMI: BMI (kg/m2): 26.4 (04-06-23 @ 14:02)  HbA1c:   Glucose: POCT Blood Glucose.: 120 mg/dL (04-08-23 @ 22:15)    BP: 133/89 (04-12-23 @ 17:20) (102/69 - 138/93)  Lipid Panel: Date/Time: 04-12-23 @ 05:30  Cholesterol, Serum: 108  Direct LDL: --  HDL Cholesterol, Serum: 25  Total Cholesterol/HDL Ration Measurement: --  Triglycerides, Serum: 118   BMI: BMI (kg/m2): 26.4 (04-06-23 @ 14:02)  HbA1c:   Glucose: POCT Blood Glucose.: 120 mg/dL (04-08-23 @ 22:15)    BP: 142/99 (04-13-23 @ 16:38) (102/69 - 142/99)  Lipid Panel: Date/Time: 04-12-23 @ 05:30  Cholesterol, Serum: 108  Direct LDL: --  HDL Cholesterol, Serum: 25  Total Cholesterol/HDL Ration Measurement: --  Triglycerides, Serum: 118

## 2023-04-13 NOTE — DIETITIAN INITIAL EVALUATION ADULT - OTHER INFO
Patient interviewed with assistance of  line as patient is predominately Telugu speaking. Writer has previously assessed patient on consult services and part of the information in this note is taken from my consult note.  He is a 27 year old man from [facility] presents with low motor activity, rigidity, hypertonia, and delayed responses to questions about why he is in the hospital - i.e., responded with a deep sigh, and searching eyes when questioned about this by us (in his native language, using  services). At times, he responded to questions involubly, and his speech was hard to make out at the bedside - the  said some responses were gibberish. Lack of response to questions about paranoia or auditory hallucinations. Responses were limited to one or a few words, and appropriate to the topic.     Patient seen at bedside for nutrition consult. Current diet order: regular. Unable to conduct RD interview d/t pt's mental status. NKFA. Per RN, pt only consumed juice at breakfast. No dosing height or weight entered in EMR, however dosing height upon previous admission was 5'9" and dosing weight 178.6#. Patricio score 20. No pressure injuries documented. Observed with no overt signs and symptoms of muscle or fat wasting. Based on ASPEN guidelines, pt does not meet criteria for malnutrition. No cultural ethnic, Buddhism food preferences noted. See nutrition recommendations below.

## 2023-04-14 DIAGNOSIS — N13.30 UNSPECIFIED HYDRONEPHROSIS: ICD-10-CM

## 2023-04-14 DIAGNOSIS — N39.0 URINARY TRACT INFECTION, SITE NOT SPECIFIED: ICD-10-CM

## 2023-04-14 DIAGNOSIS — R41.82 ALTERED MENTAL STATUS, UNSPECIFIED: ICD-10-CM
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
## 2023-04-14 DIAGNOSIS — G93.40 ENCEPHALOPATHY, UNSPECIFIED: ICD-10-CM

## 2023-04-14 DIAGNOSIS — F06.1 CATATONIC DISORDER DUE TO KNOWN PHYSIOLOGICAL CONDITION: ICD-10-CM

## 2023-04-14 DIAGNOSIS — R65.10 SYSTEMIC INFLAMMATORY RESPONSE SYNDROME (SIRS) OF NON-INFECTIOUS ORIGIN WITHOUT ACUTE ORGAN DYSFUNCTION: ICD-10-CM
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
## 2023-04-14 DIAGNOSIS — F22 DELUSIONAL DISORDERS: ICD-10-CM

## 2023-04-14 DIAGNOSIS — R33.9 RETENTION OF URINE, UNSPECIFIED: ICD-10-CM

## 2023-04-14 LAB
A1C WITH ESTIMATED AVERAGE GLUCOSE RESULT: 5.1 % — SIGNIFICANT CHANGE UP (ref 4–5.6)
B BURGDOR AB CSF-ACNC: SIGNIFICANT CHANGE UP
CHOLEST SERPL-MCNC: 118 MG/DL — SIGNIFICANT CHANGE UP
CULTURE RESULTS: SIGNIFICANT CHANGE UP
ESTIMATED AVERAGE GLUCOSE: 100 MG/DL — SIGNIFICANT CHANGE UP (ref 68–114)
HDLC SERPL-MCNC: 24 MG/DL — LOW
LIPID PNL WITH DIRECT LDL SERPL: 77 MG/DL — SIGNIFICANT CHANGE UP
MBP CSF-MCNC: 1.6 NG/ML — SIGNIFICANT CHANGE UP (ref 0–3.8)
NON HDL CHOLESTEROL: 94 MG/DL — SIGNIFICANT CHANGE UP
OLIGOCLONAL BANDS CSF ELPH-IMP: SIGNIFICANT CHANGE UP
SPECIMEN SOURCE: SIGNIFICANT CHANGE UP
TRIGL SERPL-MCNC: 87 MG/DL — SIGNIFICANT CHANGE UP

## 2023-04-14 PROCEDURE — 99232 SBSQ HOSP IP/OBS MODERATE 35: CPT

## 2023-04-14 RX ORDER — OLANZAPINE 15 MG/1
10 TABLET, FILM COATED ORAL AT BEDTIME
Refills: 0 | Status: DISCONTINUED | OUTPATIENT
Start: 2023-04-14 | End: 2023-04-26

## 2023-04-14 RX ADMIN — Medication 2 MILLIGRAM(S): at 15:19

## 2023-04-14 RX ADMIN — OLANZAPINE 10 MILLIGRAM(S): 15 TABLET, FILM COATED ORAL at 21:50

## 2023-04-14 RX ADMIN — Medication 2 MILLIGRAM(S): at 21:50

## 2023-04-14 RX ADMIN — Medication 2 MILLIGRAM(S): at 11:15

## 2023-04-14 NOTE — BH INPATIENT PSYCHIATRY PROGRESS NOTE - NSBHATTESTCOMMENTATTENDFT_PSY_A_CORE
;;04/14: sitting at a table at his desk in his room eating;  on 1:1 CO because of disorganization and catatonia;  as per staff compliant with medication.  Interviewed  with LLS # [id number];  "When am I leaving?"  "Imposteur" (which in Yi as per  means "clean"?) but unclear how the patient meant this as he does speak English when he chooses to.   However patient did become more agitated during the interview "My parents will come and visit me.";  appears to have been improving.  Will increase Zyprexa/Zydis to 10mg at night for psychosis.  Monitor another 24 hours for catatonia and review need for CO.

## 2023-04-14 NOTE — BH INPATIENT PSYCHIATRY PROGRESS NOTE - NSBHCHARTREVIEWVS_PSY_A_CORE FT
Vital Signs Last 24 Hrs  T(C): 36.8 (04-13-23 @ 16:38), Max: 36.8 (04-13-23 @ 16:38)  T(F): 98.3 (04-13-23 @ 16:38), Max: 98.3 (04-13-23 @ 16:38)  HR: 89 (04-13-23 @ 16:38) (89 - 101)  BP: 142/99 (04-13-23 @ 16:38) (128/90 - 142/99)  BP(mean): --  RR: 17 (04-13-23 @ 16:38) (17 - 18)  SpO2: 99% (04-13-23 @ 16:38) (97% - 99%)     Vital Signs Last 24 Hrs  T(C): 36.4 (04-14-23 @ 08:21), Max: 36.8 (04-13-23 @ 16:38)  T(F): 97.6 (04-14-23 @ 08:21), Max: 98.3 (04-13-23 @ 16:38)  HR: 85 (04-14-23 @ 08:21) (85 - 89)  BP: 118/89 (04-14-23 @ 08:21) (118/89 - 142/99)  BP(mean): --  RR: 18 (04-14-23 @ 08:21) (17 - 18)  SpO2: 98% (04-14-23 @ 08:21) (98% - 99%)     Vital Signs Last 24 Hrs  T(C): 36.8 (04-18-23 @ 17:53), Max: 36.8 (04-18-23 @ 17:53)  T(F): 98.3 (04-18-23 @ 17:53), Max: 98.3 (04-18-23 @ 17:53)  HR: 87 (04-18-23 @ 17:53) (80 - 87)  BP: 146/93 (04-18-23 @ 17:53) (126/83 - 146/93)  BP(mean): --  RR: 18 (04-18-23 @ 17:53) (18 - 18)  SpO2: 98% (04-18-23 @ 17:53) (98% - 99%)

## 2023-04-14 NOTE — BH INPATIENT PSYCHIATRY PROGRESS NOTE - NSBHCHARTREVIEWLAB_PSY_A_CORE FT
CBC: within normal limits  CRP: raised  CMP: Liver and renal function tests within normal limites  Urine toxicology negative
CBC: within normal limits  CRP: raised  CMP: Liver and renal function tests within normal limites  Urine toxicology negative

## 2023-04-14 NOTE — BH INPATIENT PSYCHIATRY PROGRESS NOTE - NSICDXBHTERTIARYDX_PSY_ALL_CORE
R/O MDD (major depressive disorder), single episode, severe with psychosis   F32.3  
R/O MDD (major depressive disorder), single episode, severe with psychosis   F32.3

## 2023-04-14 NOTE — BH INPATIENT PSYCHIATRY PROGRESS NOTE - NSBHFUPINTERVALHXFT_PSY_A_CORE
Patient is medically stable. No acute events over the night. Patient is accepting his medications.  services was used  ID: [id number]. Patient stated that he hears voices of people he knows in his life when no one is around. Patient stated that these voices are mainly of men who say means things to him. He stated that he some times see people in his room but he is not sure if these voices are real or not.   Patient stated that his brother visited him yesterday but " I am not sure if he is my real brother". Patient stated that he slept well yesterday and that he has a fair appetite. patient exhibited very long pauses at some times while responding to the questions and some time he responded with unrelated answer. Patient was encouraged ot take a shower and to move around with the nursing staff help. He asked if he can leave to home but writer explained that he still need to be in the hospital. Hernandez jenny scale was conducted and patient scored:  9 at 11 am on 4/14. Patient was observed to take his  morning ativan and he drank some of the water with the medications when the writer asked him too. He denies any SI or HI.                      ON 1:1 because of disorganization and catatonia.

## 2023-04-14 NOTE — BH INPATIENT PSYCHIATRY PROGRESS NOTE - NSBHASSESSSUMMFT_PSY_ALL_CORE
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 Patient was endorsing paranoid ideation and ideas of reference to family which precipitated his hospital visit.  Initially he was admitted to inpatient psychiatry on 04.05.23 but early in admission was found unresponsive on the floor, rapid response was called and patient was transferred to medicine on 04.06.23. He was admitted for medical work-up and suspected infection or seizure disorder from 04.06.23 to 04.11.23. His work up including blood work, urine toxicology, neuroimaging (MRI and CT) plus 24 hr vEEG was not indicative of an organic cause and patient was transferred back to psychiatry on 04.12.23. During his time on medication, he was followed by psychiatry consult from 04.07.23 to 04.11.23 with recommended treatment for Catatonia started which patient responded too.  On assessment today, he initially presented with Hernandez Mohsen Score of 17 with mutism, fixed gaze, negativism, rigidity, meitghen, withdrawal, ambitendency and perseveration. Despite use of , history taking was limited as patient would respond with nonsense words to certain questions as per  but other times would respond linearly. He was examined briefly post Ativan dose with improvement noted in rigidity, mutism and negativism but other symptoms remain unchanged. He was also noticed to be easily distractible and possibly responding to internal stimuli.  Based on assessment and history, it is unclear what the precipitating factors are at this time but perpetuating factors include conflict with family, being away from wife and long prodrome period. Predisposing factors include substance use in father and his protective factors are support from his brothers, being able to maintain employment, and quick response to psychopharmacological intervention. At this time, his primary diagnosis is Catatonia and with high suspicion for primary psychotic disorder but also ruling out MDD with psychotic features.    Plan:  1. Patient is not an imminent danger to self or other but unable to effectively manage ADL's so constant observation ongoing.  2. Psychopharmacological:  Standing Lorazepam 2 mg intramuscular at 9 am, 3 pm and 7 pm daily for Catatonia; to be titrated up or down based on response and possibly converted to oral once patient is more engaged  Start Olanzapine disintegrating tablet 5 mg nightly for psychotic features  3. Medical: HBA1c to be ordered as patient on standing psychotropic medications and PT consult ordered for ambulation concerns  4. Collateral: Attempted to call Brother Garcia [facility] [phone] but only able to leave voicemail today and will follow up.    ;;04/13: Mostly mute but makes unclear statements when using S # [id number] Occitan translation;  however after the interivew says "What's your name?" in clear English.  seen visiting with parents and appears more verbal and possibly eating food.  ON 1:1 because of disorganization and catatonia. As patient is consistently not compliant with medication needed to treat the patient's condition and the patient's symptoms remain severe and interfere with functioning, application is being made for treatment over objection  ; discussed with patient using  phone.  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 Patient was endorsing paranoid ideation and ideas of reference to family which precipitated his hospital visit.  Initially he was admitted to inpatient psychiatry on 04.05.23 but early in admission was found unresponsive on the floor, rapid response was called and patient was transferred to medicine on 04.06.23. He was admitted for medical work-up and suspected infection or seizure disorder from 04.06.23 to 04.11.23. His work up including blood work, urine toxicology, neuroimaging (MRI and CT) plus 24 hr vEEG was not indicative of an organic cause and patient was transferred back to psychiatry on 04.12.23. During his time on medication, he was followed by psychiatry consult from 04.07.23 to 04.11.23 with recommended treatment for Catatonia started which patient responded too.  On assessment today, he initially presented with Hernandez Mohsen Score of 17 with mutism, fixed gaze, negativism, rigidity, meitghen, withdrawal, ambitendency and perseveration. Despite use of , history taking was limited as patient would respond with nonsense words to certain questions as per  but other times would respond linearly. He was examined briefly post Ativan dose with improvement noted in rigidity, mutism and negativism but other symptoms remain unchanged. He was also noticed to be easily distractible and possibly responding to internal stimuli.  Based on assessment and history, it is unclear what the precipitating factors are at this time but perpetuating factors include conflict with family, being away from wife and long prodrome period. Predisposing factors include substance use in father and his protective factors are support from his brothers, being able to maintain employment, and quick response to psychopharmacological intervention. At this time, his primary diagnosis is Catatonia and with high suspicion for primary psychotic disorder but also ruling out MDD with psychotic features.    4/14: Patient is medically stable. No acute events over the night. Patient is accepting his oral  medications. patient scored:  9 on the Bush mohsen scale at 11 am. Patient was observed to take his  morning ativan and he drank some of the water with the medications when the writer asked him too. He denies any SI or HI.    Plan:  1. Patient is not an imminent danger to self or other but unable to effectively manage ADL's so constant observation ongoing.  2. Psychopharmacological:  Continue with standing Lorazepam 2 mg oral at 9 am, 1 pm and 5 pm daily for Catatonia; to be titrated up or down based on response and possibly converted to oral based on the Hernandez mohsen scale score.   Hernandez Mohsen scale to be conducted daily.   Increase the Olanzapine disintegrating tablet 5 mg to 10 mg nightly for psychotic features.   3. Medical: HBA1c: 5.1. PT consult ordered for ambulation concerns.   4. Collateral: will try to reach out again to the Brother Garcia [facility] [phone].     ;;04/13: Mostly mute but makes unclear statements when using S # [id number] Croatian translation;  however after the interview says "What's your name?" in clear English.  seen visiting with parents and appears more verbal and possibly eating food.  ON 1:1 because of disorganization and catatonia. As patient is consistently not compliant with medication needed to treat the patient's condition and the patient's symptoms remain severe and interfere with functioning, application is being made for treatment over objection  ; discussed with patient using  phone.  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 Patient was endorsing paranoid ideation and ideas of reference to family which precipitated his hospital visit.  Initially he was admitted to inpatient psychiatry on 04.05.23 but early in admission was found unresponsive on the floor, rapid response was called and patient was transferred to medicine on 04.06.23. He was admitted for medical work-up and suspected infection or seizure disorder from 04.06.23 to 04.11.23. His work up including blood work, urine toxicology, neuroimaging (MRI and CT) plus 24 hr vEEG was not indicative of an organic cause and patient was transferred back to psychiatry on 04.12.23. During his time on medication, he was followed by psychiatry consult from 04.07.23 to 04.11.23 with recommended treatment for Catatonia started which patient responded too.  On assessment today, he initially presented with Hernandez Mohsen Score of 17 with mutism, fixed gaze, negativism, rigidity, meitghen, withdrawal, ambitendency and perseveration. Despite use of , history taking was limited as patient would respond with nonsense words to certain questions as per  but other times would respond linearly. He was examined briefly post Ativan dose with improvement noted in rigidity, mutism and negativism but other symptoms remain unchanged. He was also noticed to be easily distractible and possibly responding to internal stimuli.  Based on assessment and history, it is unclear what the precipitating factors are at this time but perpetuating factors include conflict with family, being away from wife and long prodrome period. Predisposing factors include substance use in father and his protective factors are support from his brothers, being able to maintain employment, and quick response to psychopharmacological intervention. At this time, his primary diagnosis is Catatonia and with high suspicion for primary psychotic disorder but also ruling out MDD with psychotic features.    4/14: Patient is medically stable. No acute events over the night. Patient is accepting his oral  medications. patient scored:  9 on the Bush mohsen scale at 11 am. Patient was observed to take his  morning ativan and he drank some of the water with the medications when the writer asked him too. He denies any SI or HI.    Plan:  1. Patient is not an imminent danger to self or other but unable to effectively manage ADL's so constant observation ongoing.  2. Psychopharmacological:  Continue with standing Lorazepam 2 mg oral at 9 am, 1 pm and 5 pm daily for Catatonia; to be titrated up or down based on response and possibly converted to oral based on the Hernandez mohsen scale score.   Hernandez Mohsen scale to be conducted daily.   Increase the Olanzapine disintegrating tablet 5 mg to 10 mg nightly for psychotic features.   3. Medical: HBA1c: 5.1. PT consult ordered for ambulation concerns.   4. Collateral: will try to reach out again to the Brother Garcia [facility] [phone].     ;;04/13: Mostly mute but makes unclear statements when using S # [id number] Haitian translation;  however after the interview says "What's your name?" in clear English.  seen visiting with parents and appears more verbal and possibly eating food.  ON 1:1 because of disorganization and catatonia. As patient is consistently not compliant with medication needed to treat the patient's condition and the patient's symptoms remain severe and interfere with functioning, application is being made for treatment over objection  ; discussed with patient using  phone.  No comment by patient ; says "I take medication." (?)    ;;04/18: off of CO for two days; no evidence of daytime sedation; social with family; smiling ; Focused on discharge; wants to leave soon.

## 2023-04-14 NOTE — BH INPATIENT PSYCHIATRY PROGRESS NOTE - CURRENT MEDICATION
MEDICATIONS  (STANDING):  LORazepam     Tablet 2 milliGRAM(s) Oral <User Schedule>  OLANZapine Disintegrating Tablet 5 milliGRAM(s) Oral <User Schedule>    MEDICATIONS  (PRN):  acetaminophen     Tablet .. 650 milliGRAM(s) Oral every 6 hours PRN Moderate Pain (4 - 6)  aluminum hydroxide/magnesium hydroxide/simethicone Suspension 30 milliLiter(s) Oral every 4 hours PRN Dyspepsia  OLANZapine 5 milliGRAM(s) Oral every 8 hours PRN Agitation  traZODone 50 milliGRAM(s) Oral at bedtime PRN Sleep   MEDICATIONS  (STANDING):  LORazepam     Tablet 2 milliGRAM(s) Oral <User Schedule>  LORazepam   Injectable 2 milliGRAM(s) IntraMuscular once  OLANZapine Disintegrating Tablet 10 milliGRAM(s) Oral at bedtime    MEDICATIONS  (PRN):  acetaminophen     Tablet .. 650 milliGRAM(s) Oral every 6 hours PRN Moderate Pain (4 - 6)  aluminum hydroxide/magnesium hydroxide/simethicone Suspension 30 milliLiter(s) Oral every 4 hours PRN Dyspepsia  OLANZapine 5 milliGRAM(s) Oral every 8 hours PRN Agitation  traZODone 50 milliGRAM(s) Oral at bedtime PRN Sleep

## 2023-04-14 NOTE — PHYSICAL THERAPY INITIAL EVALUATION ADULT - PERTINENT HX OF CURRENT PROBLEM, REHAB EVAL
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 Patient was endorsing paranoid ideation and ideas of reference to family which precipitated his hospital visit.  Initially he was admitted to inpatient psychiatry on 04.05.23 but early in admission was found unresponsive on the floor, rapid response was called and patient was transferred to medicine on 04.06.23. He was admitted for medical work-up and suspected infection or seizure disorder from 04.06.23 to 04.11.23. His work up including blood work, urine toxicology, neuroimaging (MRI and CT) plus 24 hr vEEG was not indicative of an organic cause and patient was transferred back to psychiatry on 04.12.23. During his time on medication, he was followed by psychiatry consult from 04.07.23 to 04.11.23 with recommended treatment for Catatonia started which patient responded too.

## 2023-04-14 NOTE — BH INPATIENT PSYCHIATRY PROGRESS NOTE - NSBHMETABOLIC_PSY_ALL_CORE_FT
BMI: BMI (kg/m2): 26.4 (04-06-23 @ 14:02)  HbA1c: A1C with Estimated Average Glucose Result: 5.1 % (04-14-23 @ 07:32)    Glucose: POCT Blood Glucose.: 120 mg/dL (04-08-23 @ 22:15)    BP: 142/99 (04-13-23 @ 16:38) (102/69 - 142/99)  Lipid Panel: Date/Time: 04-14-23 @ 07:32  Cholesterol, Serum: 118  Direct LDL: --  HDL Cholesterol, Serum: 24  Total Cholesterol/HDL Ration Measurement: --  Triglycerides, Serum: 87   BMI: BMI (kg/m2): 26.4 (04-06-23 @ 14:02)  HbA1c: A1C with Estimated Average Glucose Result: 5.1 % (04-14-23 @ 07:32)    Glucose: POCT Blood Glucose.: 120 mg/dL (04-08-23 @ 22:15)    BP: 118/89 (04-14-23 @ 08:21) (102/69 - 142/99)  Lipid Panel: Date/Time: 04-14-23 @ 07:32  Cholesterol, Serum: 118  Direct LDL: --  HDL Cholesterol, Serum: 24  Total Cholesterol/HDL Ration Measurement: --  Triglycerides, Serum: 87   BMI: BMI (kg/m2): 26.9 (04-18-23 @ 16:50)  HbA1c: A1C with Estimated Average Glucose Result: 5.1 % (04-14-23 @ 07:32)    Glucose: POCT Blood Glucose.: 120 mg/dL (04-08-23 @ 22:15)    BP: 146/93 (04-18-23 @ 17:53) (126/83 - 152/88)  Lipid Panel: Date/Time: 04-14-23 @ 07:32  Cholesterol, Serum: 118  Direct LDL: --  HDL Cholesterol, Serum: 24  Total Cholesterol/HDL Ration Measurement: --  Triglycerides, Serum: 87

## 2023-04-14 NOTE — PHYSICAL THERAPY INITIAL EVALUATION ADULT - LEVEL OF CONSCIOUSNESS, REHAB EVAL
Celso Bahena discharged to home accompanied by family member.   Patient provided with the following educational materials upon discharge:  Hypoglycemia, hyperglycemia, managing diabetes at home, action of lantus and lipro, DKA.   Valuables and belongings sent with patient.   discharge summary, discharge instructions, medications and follow up appointments reviewed with patient and family member.  Patient and family member verbalized understanding   lethargic/somnolent/confused

## 2023-04-14 NOTE — BH INPATIENT PSYCHIATRY PROGRESS NOTE - NSBHCHARTREVIEWINVESTIGATE_PSY_A_CORE FT
MRI head: shows no acute or chronic abnormalities  CT Head: No acute or chronic changes  EE hrs vEEG shows not epileptifiorm activity
MRI head: shows no acute or chronic abnormalities  CT Head: No acute or chronic changes  EE hrs vEEG shows not epileptifiorm activity

## 2023-04-14 NOTE — BH INPATIENT PSYCHIATRY PROGRESS NOTE - OTHER
Increased tone Denies but easily distractable Stable Poverty of content Unable to cooperate due to symptoms suspect that his brother is not his real brother

## 2023-04-14 NOTE — PHYSICAL THERAPY INITIAL EVALUATION ADULT - ADDITIONAL COMMENTS
Pt lives with parents in an elevator access apartment with no IVY. Pt was indpt with ADLs and IADLs prior to admission. Pt denies use of AD for ambulation.

## 2023-04-15 PROCEDURE — 99232 SBSQ HOSP IP/OBS MODERATE 35: CPT

## 2023-04-15 RX ADMIN — Medication 2 MILLIGRAM(S): at 09:39

## 2023-04-15 RX ADMIN — Medication 2 MILLIGRAM(S): at 15:12

## 2023-04-15 RX ADMIN — Medication 2 MILLIGRAM(S): at 22:05

## 2023-04-15 RX ADMIN — OLANZAPINE 10 MILLIGRAM(S): 15 TABLET, FILM COATED ORAL at 22:06

## 2023-04-15 NOTE — BH INPATIENT PSYCHIATRY PROGRESS NOTE - NSBHFUPINTERVALHXFT_PSY_A_CORE
Chart reviewed; patient seen. Patient sleeping and when woken by voice, did not participate in interview. Unclear if uncooperative at this time due to sleepiness or mutism. Did not appear oversedated. Will continue to monitor. Remains on 1:1.

## 2023-04-15 NOTE — BH INPATIENT PSYCHIATRY PROGRESS NOTE - NSICDXBHSECONDARYDX_PSY_ALL_CORE
Schizophrenia   F20.9  MDD (major depressive disorder), single episode, severe with psychosis   F32.3

## 2023-04-15 NOTE — BH INPATIENT PSYCHIATRY PROGRESS NOTE - CURRENT MEDICATION
MEDICATIONS  (STANDING):  LORazepam     Tablet 2 milliGRAM(s) Oral <User Schedule>  LORazepam   Injectable 2 milliGRAM(s) IntraMuscular once  OLANZapine Disintegrating Tablet 10 milliGRAM(s) Oral at bedtime    MEDICATIONS  (PRN):  acetaminophen     Tablet .. 650 milliGRAM(s) Oral every 6 hours PRN Moderate Pain (4 - 6)  aluminum hydroxide/magnesium hydroxide/simethicone Suspension 30 milliLiter(s) Oral every 4 hours PRN Dyspepsia  OLANZapine 5 milliGRAM(s) Oral every 8 hours PRN Agitation  traZODone 50 milliGRAM(s) Oral at bedtime PRN Sleep

## 2023-04-15 NOTE — BH INPATIENT PSYCHIATRY PROGRESS NOTE - NSBHCHARTREVIEWVS_PSY_A_CORE FT
Vital Signs Last 24 Hrs  T(C): 36.4 (04-14-23 @ 16:43), Max: 36.4 (04-14-23 @ 16:43)  T(F): 97.6 (04-14-23 @ 16:43), Max: 97.6 (04-14-23 @ 16:43)  HR: 86 (04-15-23 @ 09:00) (86 - 92)  BP: 152/93 (04-15-23 @ 09:00) (133/82 - 152/93)  BP(mean): --  RR: 18 (04-15-23 @ 09:00) (18 - 18)  SpO2: 100% (04-15-23 @ 09:00) (99% - 100%)

## 2023-04-15 NOTE — BH INPATIENT PSYCHIATRY PROGRESS NOTE - NSBHMETABOLIC_PSY_ALL_CORE_FT
BMI: BMI (kg/m2): 26.4 (04-06-23 @ 14:02)  HbA1c: A1C with Estimated Average Glucose Result: 5.1 % (04-14-23 @ 07:32)    Glucose: POCT Blood Glucose.: 120 mg/dL (04-08-23 @ 22:15)    BP: 152/93 (04-15-23 @ 09:00) (118/89 - 152/93)  Lipid Panel: Date/Time: 04-14-23 @ 07:32  Cholesterol, Serum: 118  Direct LDL: --  HDL Cholesterol, Serum: 24  Total Cholesterol/HDL Ration Measurement: --  Triglycerides, Serum: 87

## 2023-04-15 NOTE — BH INPATIENT PSYCHIATRY PROGRESS NOTE - NSBHASSESSSUMMFT_PSY_ALL_CORE
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 Patient was endorsing paranoid ideation and ideas of reference to family which precipitated his hospital visit. Initially he was admitted to inpatient psychiatry on 04.05.23 but early in admission was found unresponsive on the floor, rapid response was called and patient was transferred to medicine on 04.06.23. He was admitted for medical work-up and suspected infection or seizure disorder from 04.06.23 to 04.11.23. His work up including blood work, urine toxicology, neuroimaging (MRI and CT) plus 24 hr vEEG was not indicative of an organic cause and patient was transferred back to psychiatry on 04.12.23."    Plan:  --Requires 1:1 for management of ADLs on unit at this time given extent of ongoing catatonia  --Continue with standing Lorazepam 2 mg oral at 9 am, 1 pm and 5 pm daily for Catatonia; to be titrated up or down based on response and possibly converted to oral based on the Hernandez jenny scale score  --Increase the Olanzapine disintegrating tablet 5 mg to 10 mg nightly for psychotic features.   --Rest of plan per primary team

## 2023-04-16 RX ADMIN — Medication 2 MILLIGRAM(S): at 21:49

## 2023-04-16 RX ADMIN — Medication 2 MILLIGRAM(S): at 15:47

## 2023-04-16 RX ADMIN — OLANZAPINE 10 MILLIGRAM(S): 15 TABLET, FILM COATED ORAL at 21:49

## 2023-04-16 RX ADMIN — Medication 2 MILLIGRAM(S): at 09:38

## 2023-04-16 NOTE — BH INPATIENT PSYCHIATRY PROGRESS NOTE - NSBHMETABOLIC_PSY_ALL_CORE_FT
BMI: BMI (kg/m2): 26.4 (04-06-23 @ 14:02)  HbA1c: A1C with Estimated Average Glucose Result: 5.1 % (04-14-23 @ 07:32)    Glucose: POCT Blood Glucose.: 120 mg/dL (04-08-23 @ 22:15)    BP: 148/92 (04-16-23 @ 08:59) (118/89 - 152/93)  Lipid Panel: Date/Time: 04-14-23 @ 07:32  Cholesterol, Serum: 118  Direct LDL: --  HDL Cholesterol, Serum: 24  Total Cholesterol/HDL Ration Measurement: --  Triglycerides, Serum: 87

## 2023-04-16 NOTE — BH INPATIENT PSYCHIATRY PROGRESS NOTE - NSBHFUPINTERVALHXFT_PSY_A_CORE
Chart and nursing notes reviewed.  No acute events overnight, VSS.  The patient has been interacting appropriately with staff and peers and has been compliant with medications.  The patient continues to tolerate medications and denies any medication side effects.    On evaluation, the patient is calm, cooperative, demonstrating good behavioral control and linear thought processes.      Chart reviewed; patient seen. Patient sleeping and when woken by voice, did not participate in interview. Unclear if uncooperative at this time due to sleepiness or mutism. Did not appear oversedated. Will continue to monitor. Remains on 1:1. Chart and nursing notes reviewed.  No acute events overnight, VSS.  The patient has been compliant with medications.  The patient continues to tolerate medications and denies any medication side effects.  CO 1:1 at bedside.      On evaluation, the patient is calm, cooperative, fairly related with good eye contact, constricted affect and demonstrating good behavioral control and linear thought processes.  The patient reports that his mood is "fine" and states that his family will be coming to visit him later today.  The patient denies lethargy and sedation and reports that he is not experiencing any side effects from the medication.  The patient denies SI/HI, intent and plan.  The patient otherwise does not engage in the interview.  All questions and concerns addressed.

## 2023-04-16 NOTE — BH INPATIENT PSYCHIATRY PROGRESS NOTE - NSBHCHARTREVIEWVS_PSY_A_CORE FT
Vital Signs Last 24 Hrs  T(C): 36.8 (04-16-23 @ 08:59), Max: 36.8 (04-15-23 @ 18:04)  T(F): 98.3 (04-16-23 @ 08:59), Max: 98.3 (04-15-23 @ 18:04)  HR: 76 (04-16-23 @ 08:59) (76 - 87)  BP: 148/92 (04-16-23 @ 08:59) (128/95 - 148/92)  BP(mean): --  RR: 16 (04-16-23 @ 08:59) (16 - 18)  SpO2: 100% (04-16-23 @ 08:59) (98% - 100%)

## 2023-04-16 NOTE — BH INPATIENT PSYCHIATRY PROGRESS NOTE - NSBHASSESSSUMMFT_PSY_ALL_CORE
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 Patient was endorsing paranoid ideation and ideas of reference to family which precipitated his hospital visit. Initially he was admitted to inpatient psychiatry on 04.05.23 but early in admission was found unresponsive on the floor, rapid response was called and patient was transferred to medicine on 04.06.23. He was admitted for medical work-up and suspected infection or seizure disorder from 04.06.23 to 04.11.23. His work up including blood work, urine toxicology, neuroimaging (MRI and CT) plus 24 hr vEEG was not indicative of an organic cause and patient was transferred back to psychiatry on 04.12.23."    Plan:  --Requires 1:1 for management of ADLs on unit at this time given extent of ongoing catatonia  --Continue with standing Lorazepam 2 mg oral at 9 am, 1 pm and 5 pm daily for Catatonia; to be titrated up or down based on response and possibly converted to oral based on the Hernandez jenny scale score  --Increase the Olanzapine disintegrating tablet 5 mg to 10 mg nightly for psychotic features.   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 Patient was endorsing paranoid ideation and ideas of reference to family which precipitated his hospital visit. Initially he was admitted to inpatient psychiatry on 04.05.23 but early in admission was found unresponsive on the floor, rapid response was called and patient was transferred to medicine on 04.06.23. He was admitted for medical work-up and suspected infection or seizure disorder from 04.06.23 to 04.11.23. His work up including blood work, urine toxicology, neuroimaging (MRI and CT) plus 24 hr vEEG was not indicative of an organic cause and patient was transferred back to psychiatry on 04.12.23."    Plan:  --Requires 1:1 for management of ADLs on unit at this time given extent of ongoing catatonia  --Continue with standing Lorazepam 2 mg oral at 9 am, 1 pm and 5 pm daily for Catatonia; to be titrated up or down based on response and possibly converted to oral based on the Hernandez jenny scale score  --Continue Olanzapine disintegrating tablet 10 mg nightly for psychotic features.   --Rest of plan per primary team

## 2023-04-17 PROCEDURE — 99232 SBSQ HOSP IP/OBS MODERATE 35: CPT

## 2023-04-17 RX ADMIN — Medication 650 MILLIGRAM(S): at 21:42

## 2023-04-17 RX ADMIN — Medication 2 MILLIGRAM(S): at 13:18

## 2023-04-17 RX ADMIN — OLANZAPINE 10 MILLIGRAM(S): 15 TABLET, FILM COATED ORAL at 21:43

## 2023-04-17 RX ADMIN — Medication 2 MILLIGRAM(S): at 17:24

## 2023-04-17 RX ADMIN — Medication 650 MILLIGRAM(S): at 22:39

## 2023-04-17 NOTE — BH INPATIENT PSYCHIATRY PROGRESS NOTE - NSBHASSESSSUMMFT_PSY_ALL_CORE
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 Patient was endorsing paranoid ideation and ideas of reference to family which precipitated his hospital visit. Initially he was admitted to inpatient psychiatry on 04.05.23 but early in admission was found unresponsive on the floor, rapid response was called and patient was transferred to medicine on 04.06.23. He was admitted for medical work-up and suspected infection or seizure disorder from 04.06.23 to 04.11.23. His work up including blood work, urine toxicology, neuroimaging (MRI and CT) plus 24 hr vEEG was not indicative of an organic cause and patient was transferred back to psychiatry on 04.12.23."    Plan:  --Requires 1:1 for management of ADLs on unit at this time given extent of ongoing catatonia  --Continue with standing Lorazepam 2 mg oral at 9 am, 1 pm and 5 pm daily for Catatonia; to be titrated up or down based on response and possibly converted to oral based on the Hernandez jenny scale score  --Continue Olanzapine disintegrating tablet 10 mg nightly for psychotic features.   --Rest of plan per primary team

## 2023-04-17 NOTE — BH INPATIENT PSYCHIATRY PROGRESS NOTE - NSBHCHARTREVIEWVS_PSY_A_CORE FT
Vital Signs Last 24 Hrs  T(C): 36.5 (04-17-23 @ 08:50), Max: 36.8 (04-16-23 @ 16:22)  T(F): 97.7 (04-17-23 @ 08:50), Max: 98.2 (04-16-23 @ 16:22)  HR: 90 (04-17-23 @ 08:50) (82 - 90)  BP: 152/88 (04-17-23 @ 08:50) (150/92 - 152/88)  BP(mean): --  RR: 18 (04-17-23 @ 08:50) (16 - 18)  SpO2: 99% (04-17-23 @ 08:50) (99% - 99%)     Vital Signs Last 24 Hrs  T(C): 36.6 (04-17-23 @ 16:58), Max: 36.6 (04-17-23 @ 16:58)  T(F): 97.9 (04-17-23 @ 16:58), Max: 97.9 (04-17-23 @ 16:58)  HR: 92 (04-17-23 @ 16:58) (90 - 92)  BP: 141/90 (04-17-23 @ 16:58) (141/90 - 152/88)  BP(mean): --  RR: 19 (04-17-23 @ 16:58) (18 - 19)  SpO2: 98% (04-17-23 @ 16:58) (98% - 99%)

## 2023-04-17 NOTE — BH INPATIENT PSYCHIATRY PROGRESS NOTE - NSBHFUPINTERVALHXFT_PSY_A_CORE
Performed repeat Hernandez-Mohsen catatonia battery (score = 0) with aid of  #[id number]. No immobility/stupor, mutism absent, staring absent, posturing/catalepsy absent, grimacing absent, echopraxia/echolalia absent, stereotypy absent, mannerisms absent, stereotyped and meaningless repetition of words and phrases absent, rigidity absent, negativism absent, waxy flexibility absent, withdrawal absent, excitement absent, impulsivity absent, automatic obedience absent, mitgehen absent, muscle resistance absent, not motorically stuck, grasp reflex absent, perseveration absent, comabtiveness absent, autonomic abnormality absent.  Patient was AOx4 and reports feeling better. Sleep, appetite were good. He says that he had some R shoulder pain that the medicines have been helping with. He remembers falling down at home in [place], but is not sure whether that was when he injured his shoulder. On the mini-mental state exam (14/19) he had an easy time with orientation, registration and recall. Language and education (highest level attained: 8th grade in [facility]) proved to be a barrier to assessing other skills.   While on the phone with  #[id number], the patient asked that we call his brother to check in about history and discharge planning. The patient wrote his brother Augie Reeder's phone number down and we followed up. Mr. Augie Reeder said that his brother had been crying before he passed out and came to the ED. He had been taking Advil for headaches. He had also been spending a lot of time (8 hours) on the phone with his wife in [facility]. Brother is concerned that patient was stressed, depressed, or having problems (i.e. headache) that led him to passing out. But says patient does not smoke, drink, or do drugs - and was previously healthy - playing soccer on weekends, etc. Family is cooperative and highly supportive.

## 2023-04-17 NOTE — BH INPATIENT PSYCHIATRY PROGRESS NOTE - NSBHATTESTCOMMENTATTENDFT_PSY_A_CORE
;;04/17:   Speaks in English to the writer;  mostly discharged focused; eating ; not catatonic; CO stopped; continue curent regime and observe for socialization.

## 2023-04-17 NOTE — BH INPATIENT PSYCHIATRY PROGRESS NOTE - NSBHFUPINTERVALCCFT_PSY_A_CORE
catatonic and mute initially;  unable to determine suicide level as no reponses; currently risk is MODERATE given catatonic history and some response in the miliue to Ativan and Zyprexa

## 2023-04-17 NOTE — BH INPATIENT PSYCHIATRY PROGRESS NOTE - NSBHMETABOLIC_PSY_ALL_CORE_FT
BMI: BMI (kg/m2): 26.4 (04-06-23 @ 14:02)  HbA1c: A1C with Estimated Average Glucose Result: 5.1 % (04-14-23 @ 07:32)    Glucose: POCT Blood Glucose.: 120 mg/dL (04-08-23 @ 22:15)    BP: 152/88 (04-17-23 @ 08:50) (128/95 - 152/93)  Lipid Panel: Date/Time: 04-14-23 @ 07:32  Cholesterol, Serum: 118  Direct LDL: --  HDL Cholesterol, Serum: 24  Total Cholesterol/HDL Ration Measurement: --  Triglycerides, Serum: 87   BMI: BMI (kg/m2): 26.4 (04-06-23 @ 14:02)  HbA1c: A1C with Estimated Average Glucose Result: 5.1 % (04-14-23 @ 07:32)    Glucose: POCT Blood Glucose.: 120 mg/dL (04-08-23 @ 22:15)    BP: 141/90 (04-17-23 @ 16:58) (128/95 - 152/93)  Lipid Panel: Date/Time: 04-14-23 @ 07:32  Cholesterol, Serum: 118  Direct LDL: --  HDL Cholesterol, Serum: 24  Total Cholesterol/HDL Ration Measurement: --  Triglycerides, Serum: 87

## 2023-04-18 PROBLEM — Z00.00 ENCOUNTER FOR PREVENTIVE HEALTH EXAMINATION: Status: ACTIVE | Noted: 2023-04-18

## 2023-04-18 PROCEDURE — 99232 SBSQ HOSP IP/OBS MODERATE 35: CPT

## 2023-04-18 RX ADMIN — Medication 2 MILLIGRAM(S): at 22:10

## 2023-04-18 RX ADMIN — Medication 650 MILLIGRAM(S): at 13:10

## 2023-04-18 RX ADMIN — Medication 650 MILLIGRAM(S): at 13:30

## 2023-04-18 RX ADMIN — Medication 2 MILLIGRAM(S): at 11:28

## 2023-04-18 RX ADMIN — OLANZAPINE 10 MILLIGRAM(S): 15 TABLET, FILM COATED ORAL at 22:10

## 2023-04-18 NOTE — BH INPATIENT PSYCHIATRY PROGRESS NOTE - NSBHMSEREMMEM_PSY_A_CORE
Bedside shift change report given to [address] [place] [address] Neil (oncoming nurse) by Pilar Marmolejo (offgoing nurse). Report included the following information SBAR, MAR, Recent Results and Med Rec Status. Unable to assess

## 2023-04-18 NOTE — BH TREATMENT PLAN - NSTXDISORGINTERMD_PSY_ALL_CORE
Start standing treatment for Catatonia Started standing treatment for Catatonia, antipsychotic also started. psychopharm management x 15 min daily. will see if patient has been using substances that don't come up on utox (such as K2) as siblings feel he has been using something.

## 2023-04-18 NOTE — BH SOCIAL WORK INITIAL PSYCHOSOCIAL EVALUATION - NSBHABUSECOMMENTFT_PSY_ALL_CORE
no
Pt appears very lethargic on approach. Minimally cooperative with interview, seems confused with questions and unable to provide much information.  reported to have difficulty hearing patient at times due to his soft speech.

## 2023-04-18 NOTE — BH INPATIENT PSYCHIATRY PROGRESS NOTE - NSBHFUPINTERVALHXFT_PSY_A_CORE
Not endorsing  suicidal or homicidal ideation intent or plans; no mention of auditory or visual hallucinations ; sitting with family smiing; Sleep  appetite ok; no pain issues.   "I want you talk with my brother"  (who is coming in in the evening);

## 2023-04-18 NOTE — BH SOCIAL WORK INITIAL PSYCHOSOCIAL EVALUATION - NSCMSPTSTRENGTHS_PSY_ALL_CORE
Julia/spirituality/Financial stability/Intact employment/Intact family/Strong support system/Supportive family

## 2023-04-18 NOTE — BH INPATIENT PSYCHIATRY PROGRESS NOTE - NSBHFUPINTERVALCCFT_PSY_A_CORE
catatonic and mute initially;  unable to determine suicide level as no responses; currently risk is MODERATE given catatonic history and some response in the miliue to Ativan and Zyprexa

## 2023-04-18 NOTE — BH INPATIENT PSYCHIATRY PROGRESS NOTE - NSBHCHARTREVIEWVS_PSY_A_CORE FT
Vital Signs Last 24 Hrs  T(C): 36.8 (04-18-23 @ 17:53), Max: 36.8 (04-18-23 @ 17:53)  T(F): 98.3 (04-18-23 @ 17:53), Max: 98.3 (04-18-23 @ 17:53)  HR: 87 (04-18-23 @ 17:53) (80 - 87)  BP: 146/93 (04-18-23 @ 17:53) (126/83 - 146/93)  BP(mean): --  RR: 18 (04-18-23 @ 17:53) (18 - 18)  SpO2: 98% (04-18-23 @ 17:53) (98% - 99%)

## 2023-04-18 NOTE — BH SOCIAL WORK INITIAL PSYCHOSOCIAL EVALUATION - OTHER PAST PSYCHIATRIC HISTORY (INCLUDE DETAILS REGARDING ONSET, COURSE OF ILLNESS, INPATIENT/OUTPATIENT TREATMENT)
The patient is a 28 yo male living with his parents. No formal psychiatric hx, no known previous hospitalizations, no suicide attempts, not currently in treatment or taking medications. No drug/etoh abuse hx. No known legal hx. Patient brought in my family-brother to [facility] ED for bizarre behavior for the past several days. Noted to be paranoid in ED, utox negative for all substances. Head CT unremarkable. Pt admitted to [facility] for psychosis.

## 2023-04-18 NOTE — BH INPATIENT PSYCHIATRY PROGRESS NOTE - NSBHASSESSSUMMFT_PSY_ALL_CORE
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 Patient was endorsing paranoid ideation and ideas of reference to family which precipitated his hospital visit. Initially he was admitted to inpatient psychiatry on 04.05.23 but early in admission was found unresponsive on the floor, rapid response was called and patient was transferred to medicine on 04.06.23. He was admitted for medical work-up and suspected infection or seizure disorder from 04.06.23 to 04.11.23. His work up including blood work, urine toxicology, neuroimaging (MRI and CT) plus 24 hr vEEG was not indicative of an organic cause and patient was transferred back to psychiatry on 04.12.23."    Plan:  --Requires 1:1 for management of ADLs on unit at this time given extent of ongoing catatonia  --Continue with standing Lorazepam 2 mg oral at 9 am, 1 pm and 5 pm daily for Catatonia; to be titrated up or down based on response and possibly converted to oral based on the Hernandez jenny scale score  --Continue Olanzapine disintegrating tablet 10 mg nightly for psychotic features.   --Rest of plan per primary team

## 2023-04-18 NOTE — BH INPATIENT PSYCHIATRY PROGRESS NOTE - NSBHMETABOLIC_PSY_ALL_CORE_FT
BMI: BMI (kg/m2): 26.9 (04-18-23 @ 16:50)  HbA1c: A1C with Estimated Average Glucose Result: 5.1 % (04-14-23 @ 07:32)    Glucose: POCT Blood Glucose.: 120 mg/dL (04-08-23 @ 22:15)    BP: 146/93 (04-18-23 @ 17:53) (126/83 - 152/88)  Lipid Panel: Date/Time: 04-14-23 @ 07:32  Cholesterol, Serum: 118  Direct LDL: --  HDL Cholesterol, Serum: 24  Total Cholesterol/HDL Ration Measurement: --  Triglycerides, Serum: 87

## 2023-04-19 PROCEDURE — 99232 SBSQ HOSP IP/OBS MODERATE 35: CPT

## 2023-04-19 RX ADMIN — Medication 2 MILLIGRAM(S): at 14:30

## 2023-04-19 RX ADMIN — OLANZAPINE 10 MILLIGRAM(S): 15 TABLET, FILM COATED ORAL at 21:48

## 2023-04-19 RX ADMIN — Medication 650 MILLIGRAM(S): at 21:30

## 2023-04-19 RX ADMIN — Medication 2 MILLIGRAM(S): at 10:54

## 2023-04-19 RX ADMIN — Medication 2 MILLIGRAM(S): at 21:49

## 2023-04-19 RX ADMIN — Medication 650 MILLIGRAM(S): at 20:30

## 2023-04-19 NOTE — BH INPATIENT PSYCHIATRY PROGRESS NOTE - NSBHASSESSSUMMFT_PSY_ALL_CORE
Per prior assessment/plan:  "27 year old [place] speaking man, working part time in construction and as a super attendant,  (wife in [facility]) with no children, domiciled with family, with no known past psychiatric history or medical history, brought to the hospital by family due to bizarre behavior for 1 day. Patient was endorsing paranoid ideation and ideas of reference to family which precipitated his hospital visit. Initially he was admitted to inpatient psychiatry on 04.05.23 but early in admission was found unresponsive on the floor, rapid response was called and patient was transferred to medicine on 04.06.23. He was admitted for medical work-up and suspected infection or seizure disorder from 04.06.23 to 04.11.23. His work up including blood work, urine toxicology, neuroimaging (MRI and CT) plus 24 hr vEEG was not indicative of an organic cause and patient was transferred back to psychiatry on 04.12.23."    Plan:  --Requires 1:1 for management of ADLs on unit at this time given extent of ongoing catatonia  --Continue with standing Lorazepam 2 mg oral at 9 am, 1 pm and 5 pm daily for Catatonia; to be titrated up or down based on response and possibly converted to oral based on the Hernandez jenny scale score  --Continue Olanzapine disintegrating tablet 10 mg nightly for psychotic features.   --Rest of plan per primary team

## 2023-04-19 NOTE — BH INPATIENT PSYCHIATRY PROGRESS NOTE - NSBHCHARTREVIEWVS_PSY_A_CORE FT
Vital Signs Last 24 Hrs  T(C): 36.9 (04-19-23 @ 16:38), Max: 36.9 (04-19-23 @ 16:38)  T(F): 98.5 (04-19-23 @ 16:38), Max: 98.5 (04-19-23 @ 16:38)  HR: 84 (04-19-23 @ 16:38) (84 - 87)  BP: 148/95 (04-19-23 @ 16:38) (124/89 - 148/95)  BP(mean): --  RR: 18 (04-19-23 @ 16:38) (18 - 18)  SpO2: 99% (04-19-23 @ 16:38) (98% - 99%)     Vital Signs Last 24 Hrs  T(C): 36.9 (04-19-23 @ 16:38), Max: 36.9 (04-19-23 @ 16:38)  T(F): 98.5 (04-19-23 @ 16:38), Max: 98.5 (04-19-23 @ 16:38)  HR: 84 (04-19-23 @ 16:38) (84 - 86)  BP: 148/95 (04-19-23 @ 16:38) (124/89 - 148/95)  BP(mean): --  RR: 18 (04-19-23 @ 16:38) (18 - 18)  SpO2: 99% (04-19-23 @ 16:38) (98% - 99%)

## 2023-04-19 NOTE — BH INPATIENT PSYCHIATRY PROGRESS NOTE - NSBHMETABOLIC_PSY_ALL_CORE_FT
BMI: BMI (kg/m2): 26.9 (04-18-23 @ 16:50)  HbA1c: A1C with Estimated Average Glucose Result: 5.1 % (04-14-23 @ 07:32)    Glucose: POCT Blood Glucose.: 120 mg/dL (04-08-23 @ 22:15)    BP: 148/95 (04-19-23 @ 16:38) (124/89 - 152/88)  Lipid Panel: Date/Time: 04-14-23 @ 07:32  Cholesterol, Serum: 118  Direct LDL: --  HDL Cholesterol, Serum: 24  Total Cholesterol/HDL Ration Measurement: --  Triglycerides, Serum: 87

## 2023-04-19 NOTE — BH INPATIENT PSYCHIATRY PROGRESS NOTE - NSBHMSESPABN_PSY_A_CORE
Soft volume/Slowed rate/Decreased productivity/Increased latency
Decreased productivity/Increased latency
Decreased productivity/Increased latency
Soft volume/Slowed rate/Decreased productivity/Increased latency
Decreased productivity/Increased latency
Decreased productivity/Increased latency

## 2023-04-19 NOTE — BH INPATIENT PSYCHIATRY PROGRESS NOTE - NSBHATTESTCOMMENTATTENDFT_PSY_A_CORE
;;04/19: seen with Guinean speaking staff ; staff states that patient does not use  phone effectively and has spoken to staff in English.  Not endorsing  suicidal or homicidal ideation intent or plans; no mention of auditory or visual hallucinations but at time of admission had visions of his grandparents and uncle beckoning him to suicide.  i&J: poor: limited if any awareness of medications; guarded or minimally acknowledging sxs; not reflective on issues that impact on symptoms but compliant; seen smiling with family in the afternoon; Current medications acetaminophen     Tablet .. 650 milliGRAM(s) Oral every 6 hours PRN  aluminum hydroxide/magnesium hydroxide/simethicone Suspension 30 milliLiter(s) Oral every 4 hours PRN  LORazepam     Tablet 2 milliGRAM(s) Oral <User Schedule> for catatonia  LORazepam   Injectable 2 milliGRAM(s) IntraMuscular once  OLANZapine 5 milliGRAM(s) Oral every 8 hours PRN f  OLANZapine Disintegrating Tablet 10 milliGRAM(s) Oral at bedtime for psychosis and mood  traZODone 50 milliGRAM(s) Oral at bedtime PRN

## 2023-04-19 NOTE — BH INPATIENT PSYCHIATRY PROGRESS NOTE - NSBHFUPINTERVALCCFT_PSY_A_CORE
catatonic and mute initially; currently risk is MODERATE given catatonic history and some response in the milieu to Ativan and Zyprexa  Patient is complaining of R sided and upper lumbar pain for the last three days.

## 2023-04-20 PROCEDURE — 99232 SBSQ HOSP IP/OBS MODERATE 35: CPT

## 2023-04-20 RX ADMIN — Medication 650 MILLIGRAM(S): at 13:45

## 2023-04-20 RX ADMIN — Medication 650 MILLIGRAM(S): at 13:16

## 2023-04-20 RX ADMIN — Medication 2 MILLIGRAM(S): at 10:06

## 2023-04-20 RX ADMIN — Medication 2 MILLIGRAM(S): at 16:29

## 2023-04-20 RX ADMIN — OLANZAPINE 10 MILLIGRAM(S): 15 TABLET, FILM COATED ORAL at 21:39

## 2023-04-20 RX ADMIN — Medication 2 MILLIGRAM(S): at 13:15

## 2023-04-20 NOTE — BH INPATIENT PSYCHIATRY PROGRESS NOTE - NSBHATTESTCOMMENTATTENDFT_PSY_A_CORE
;;04/19: pleasant in morning; seen with family focused on his leaving; spoke with Dr. Garcia of medicine ; concern about possible Mp's disease ; possible Living[place] Joanie ring and R flank pain; will obtain ceruloplasm; US abdomen;  if needed opthalmology consult to verify KF ring.

## 2023-04-20 NOTE — BH INPATIENT PSYCHIATRY PROGRESS NOTE - NSBHFUPINTERVALHXFT_PSY_A_CORE
Resident interviewed the patient using  phone.  Not endorsing  suicidal or homicidal ideation intent or plans; no mention of auditory or visual hallucinations

## 2023-04-20 NOTE — BH INPATIENT PSYCHIATRY PROGRESS NOTE - NSBHCHARTREVIEWVS_PSY_A_CORE FT
Vital Signs Last 24 Hrs  T(C): 36.4 (04-20-23 @ 09:05), Max: 36.9 (04-19-23 @ 16:38)  T(F): 97.5 (04-20-23 @ 09:05), Max: 98.5 (04-19-23 @ 16:38)  HR: 80 (04-20-23 @ 09:05) (80 - 84)  BP: 128/87 (04-20-23 @ 09:05) (128/87 - 148/95)  BP(mean): --  RR: 18 (04-20-23 @ 09:05) (18 - 18)  SpO2: 98% (04-20-23 @ 09:05) (98% - 99%)     Vital Signs Last 24 Hrs  T(C): 36.8 (04-20-23 @ 16:54), Max: 36.8 (04-20-23 @ 16:54)  T(F): 98.2 (04-20-23 @ 16:54), Max: 98.2 (04-20-23 @ 16:54)  HR: 82 (04-20-23 @ 16:54) (80 - 82)  BP: 118/79 (04-20-23 @ 16:54) (118/79 - 128/87)  BP(mean): --  RR: 18 (04-20-23 @ 16:54) (18 - 18)  SpO2: 99% (04-20-23 @ 16:54) (98% - 99%)

## 2023-04-20 NOTE — BH INPATIENT PSYCHIATRY PROGRESS NOTE - NSBHMSERELATED_PSY_A_CORE
Good Hydroxyzine Pregnancy And Lactation Text: This medication is not safe during pregnancy and should not be taken. It is also excreted in breast milk and breast feeding isn't recommended.

## 2023-04-20 NOTE — BH INPATIENT PSYCHIATRY PROGRESS NOTE - NSBHMETABOLIC_PSY_ALL_CORE_FT
BMI: BMI (kg/m2): 26.9 (04-18-23 @ 16:50)  HbA1c: A1C with Estimated Average Glucose Result: 5.1 % (04-14-23 @ 07:32)    Glucose: POCT Blood Glucose.: 120 mg/dL (04-08-23 @ 22:15)    BP: 128/87 (04-20-23 @ 09:05) (124/89 - 148/95)  Lipid Panel: Date/Time: 04-14-23 @ 07:32  Cholesterol, Serum: 118  Direct LDL: --  HDL Cholesterol, Serum: 24  Total Cholesterol/HDL Ration Measurement: --  Triglycerides, Serum: 87   BMI: BMI (kg/m2): 26.9 (04-18-23 @ 16:50)  HbA1c: A1C with Estimated Average Glucose Result: 5.1 % (04-14-23 @ 07:32)    Glucose: POCT Blood Glucose.: 120 mg/dL (04-08-23 @ 22:15)    BP: 118/79 (04-20-23 @ 16:54) (118/79 - 148/95)  Lipid Panel: Date/Time: 04-14-23 @ 07:32  Cholesterol, Serum: 118  Direct LDL: --  HDL Cholesterol, Serum: 24  Total Cholesterol/HDL Ration Measurement: --  Triglycerides, Serum: 87

## 2023-04-20 NOTE — BH INPATIENT PSYCHIATRY PROGRESS NOTE - NSBHASSESSSUMMFT_PSY_ALL_CORE
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 Patient was endorsing paranoid ideation and ideas of reference to family which precipitated his hospital visit. Initially he was admitted to inpatient psychiatry on 04.05.23 but early in admission was found unresponsive on the floor, rapid response was called and patient was transferred to medicine on 04.06.23. He was admitted for medical work-up and suspected infection or seizure disorder from 04.06.23 to 04.11.23. His work up including blood work, urine toxicology, neuroimaging (MRI and CT) plus 24 hr vEEG was not indicative of an organic cause and patient was transferred back to psychiatry on 04.12.23."    Plan:  --Requires 1:1 for management of ADLs on unit at this time given extent of ongoing catatonia  --Continue with standing Lorazepam 2 mg oral at 9 am, 1 pm and 5 pm daily for Catatonia; to be titrated up or down based on response and possibly converted to oral based on the Hernandez jenny scale score  --Continue Olanzapine disintegrating tablet 10 mg nightly for psychotic features.   --Rest of plan per primary team

## 2023-04-20 NOTE — BH INPATIENT PSYCHIATRY PROGRESS NOTE - NSBHFUPINTERVALCCFT_PSY_A_CORE
Pt. complains of ongoing back pain and stress associated with missing his work and family commitments

## 2023-04-21 LAB
CERULOPLASMIN SERPL-MCNC: 15 MG/DL — SIGNIFICANT CHANGE UP (ref 15–30)
CULTURE RESULTS: NO GROWTH — SIGNIFICANT CHANGE UP
SPECIMEN SOURCE: SIGNIFICANT CHANGE UP

## 2023-04-21 PROCEDURE — 99221 1ST HOSP IP/OBS SF/LOW 40: CPT

## 2023-04-21 PROCEDURE — 99232 SBSQ HOSP IP/OBS MODERATE 35: CPT

## 2023-04-21 RX ADMIN — Medication 2 MILLIGRAM(S): at 17:25

## 2023-04-21 RX ADMIN — Medication 650 MILLIGRAM(S): at 13:34

## 2023-04-21 RX ADMIN — Medication 650 MILLIGRAM(S): at 13:41

## 2023-04-21 RX ADMIN — Medication 2 MILLIGRAM(S): at 10:36

## 2023-04-21 RX ADMIN — Medication 2 MILLIGRAM(S): at 13:34

## 2023-04-21 RX ADMIN — OLANZAPINE 10 MILLIGRAM(S): 15 TABLET, FILM COATED ORAL at 21:50

## 2023-04-21 NOTE — BH INPATIENT PSYCHIATRY PROGRESS NOTE - NSICDXBHSECONDARYDX_PSY_ALL_CORE
Schizophrenia   F20.9  MDD (major depressive disorder), single episode, severe with psychosis   F32.3   Schizophrenia   F20.9

## 2023-04-21 NOTE — BH INPATIENT PSYCHIATRY PROGRESS NOTE - NSBHCHARTREVIEWVS_PSY_A_CORE FT
Vital Signs Last 24 Hrs  T(C): 36.4 (04-21-23 @ 09:07), Max: 36.8 (04-20-23 @ 16:54)  T(F): 97.5 (04-21-23 @ 09:07), Max: 98.2 (04-20-23 @ 16:54)  HR: 84 (04-21-23 @ 09:07) (82 - 84)  BP: 142/88 (04-21-23 @ 09:07) (118/79 - 142/88)  BP(mean): --  RR: 18 (04-21-23 @ 09:07) (18 - 18)  SpO2: 99% (04-21-23 @ 09:07) (99% - 99%)     Vital Signs Last 24 Hrs  T(C): 36.7 (04-21-23 @ 15:24), Max: 36.8 (04-20-23 @ 16:54)  T(F): 98 (04-21-23 @ 15:24), Max: 98.2 (04-20-23 @ 16:54)  HR: 83 (04-21-23 @ 15:24) (82 - 84)  BP: 129/85 (04-21-23 @ 15:24) (118/79 - 142/88)  BP(mean): --  RR: 18 (04-21-23 @ 15:24) (18 - 18)  SpO2: 98% (04-21-23 @ 15:24) (98% - 99%)     Vital Signs Last 24 Hrs  T(C): 36.7 (04-21-23 @ 15:24), Max: 36.7 (04-21-23 @ 15:24)  T(F): 98 (04-21-23 @ 15:24), Max: 98 (04-21-23 @ 15:24)  HR: 83 (04-21-23 @ 15:24) (83 - 84)  BP: 129/85 (04-21-23 @ 15:24) (129/85 - 142/88)  BP(mean): --  RR: 18 (04-21-23 @ 15:24) (18 - 18)  SpO2: 98% (04-21-23 @ 15:24) (98% - 99%)

## 2023-04-21 NOTE — CONSULT NOTE ADULT - ATTENDING COMMENTS
27 M with no clear PMHx admitted to inpatient psychiatry for bizarre behavior complicated by unresponsiveness ultimately diagnosed with catatonia. Medicine consulted for evaluation of potential Mp's Disease.  Pt seen w. Dr. Carrillo. Labanese  ID # [id number]. no FHx of neurologic disease or Mp's disease.   Focal PE: Kayser-Cyn ring not appreciated on eye exam.  MRI Brain normal  CTAP: liver normal    - agrees w. Dr. Carrillo A&P  - f/u Ceruloplasmin level   - very low clinical suspicion for Mp's disease     Thank you for the consult. Medicine sign off at this time. Please call us back for any further questions.

## 2023-04-21 NOTE — CONSULT NOTE ADULT - ASSESSMENT
27 M with no clear PMHx admitted to inpatient psychiatry for bizarre behavior complicated by unresponsiveness ultimately diagnosed with catatonia. Medicine consulted for evaluation of potential Mp's Disease.    #Rule out Mp's Disease  Kayser-Cyn rings seen by psych team on 4/20, but is not appreciated on today's eye exam. Patient notably has dramatically improved with benzodiazepines and although catatonia has been reported as a presenting sign of Mp's Disease, this is still an unlikely diagnosis for him. Notably, his imaging of his brain and liver were normal which supports primary psychiatric etiology vs toxin/drug related encephalopathy.  < from: MR Head w/wo IV Cont (04.06.23 @ 13:24) >  IMPRESSION: Normal MRI of the brain with and without gadolinium.    < from: CT Abdomen and Pelvis w/ Oral Cont and w/ IV Cont (04.06.23 @ 05:44) >  LIVER: Within normal limits.    Would recommend:  - follow up on serum ceruloplasmin. If less than reference range would warrant gastroenterology consultation for potential liver biopsy.  - preliminarily recommended liver ultrasound, but this test is not needed at this time in setting of recent CT A/P and no other serological or physical exam markers of cirrhosis.  - Reasonable to send images of his cornea to opthalmology to evaluate and confirm our findings  - provided patient has good follow-up in case of abnormal ceruloplasmin results, there is no internal medicine related barrier to discharge once he has been optimized psychiatrically       27 M with no clear PMHx admitted to inpatient psychiatry for bizarre behavior complicated by unresponsiveness ultimately diagnosed with catatonia. Medicine consulted for evaluation of potential Mp's Disease.    #Rule out Mp's Disease  Kayser-Cyn rings seen by psych team on 4/20, but is not appreciated on today's eye exam. Patient notably has dramatically improved with benzodiazepines and although catatonia has been reported as a presenting sign of Mp's Disease, this is still an unlikely diagnosis for him. Notably, his imaging of his brain and liver were normal which supports primary psychiatric etiology vs toxin/drug related encephalopathy.  Based on cohort data, as many as 88% of patients with neurologically active Mp's Disease have MRI findings, % have Kayser-Fleischer rings, any advanced neurologically active disease would also have liver findings. Can reference the following cohort study for further details: https://[email or web address].com/science/article/abs/pii/[email or web address]  < from: MR Head w/wo IV Cont (04.06.23 @ 13:24) >  IMPRESSION: Normal MRI of the brain with and without gadolinium.    < from: CT Abdomen and Pelvis w/ Oral Cont and w/ IV Cont (04.06.23 @ 05:44) >  LIVER: Within normal limits.    Would recommend:  - follow up on serum ceruloplasmin. If less than reference range would warrant gastroenterology consultation for potential liver biopsy.  - preliminarily recommended liver ultrasound, but this test is not needed at this time in setting of recent CT A/P and no other serological or physical exam markers of cirrhosis.  - Reasonable to send images of his cornea to opthalmology to evaluate and confirm our findings  - provided patient has good follow-up in case of abnormal ceruloplasmin results, there is no internal medicine related barrier to discharge once he has been optimized psychiatrically    Internal medicine consult team will sign off at this time. Thank you for the interesting consult question and allowing us to participate in the care of this patient.  Please call us again with further questions.    Recommendations not final until attending attestation found below.

## 2023-04-21 NOTE — BH INPATIENT PSYCHIATRY PROGRESS NOTE - OTHER
feels good and able to manage, is anxious about getting back to work and his family Does admit to some difficulty remembering the events around his recent fall(s) - concern for unwitnessed seizures?
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
## 2023-04-21 NOTE — BH INPATIENT PSYCHIATRY PROGRESS NOTE - NSBHATTESTCOMMENTATTENDFT_PSY_A_CORE
;;04/20: "I understabd" re need for US and lab work; mood is good; social with parents; smiles ; good range of affect. No SI.  Ceruloplasmin, Serum (04.21.23 @ 07:17) ; Ceruloplasmin, Serum: 15 mg/dL  await US but unlikely has Mp's disease.

## 2023-04-21 NOTE — CONSULT NOTE ADULT - SUBJECTIVE AND OBJECTIVE BOX
INTERNAL MEDICINE SERVICE CONSULTATION NOTE    Consult Reason: Evaluate for Mp's disease given Kayser-Cyn rings and catatonia    CC: paranoid delusions    HPI: per H&P dated 4/6/23  26 y/o M w/ no significant PMH, initially brought in to the ED by family on 4/4/23 for several days of bizarre behavior and was evaluated by behavioral health. Involuntarily admitted to inpatient psych for workup and treatment of acute psychosis (ddx includes brief psychotic disorder, schizophrenia, substance intoxication). 4/6/23 early AM pt had an unwitnessed fall in his room, team found him unresponsive w/ urine incontinence. Rapid response was called and he was brought to the ED. Upon evaluation in ED, pt is awake and alert but not answering questions. With the help of an , pt is able to state his name and "I am shaking." ROS limited by patient's mental status.    Hospital course complicated by RRT called in ERHO for unresponsiveness, thought to be related to risperidone given to patient. Patient admitted on 4/6 to medicine telemetry for further monitoring and neurology evaluation which included lumbar puncture and MR brain. Neurology recommended psychiatry evaluation for primary psychiatric catatonia and patient discharged to 8U on 4/11.  On 4/20 patient was noted by psychiatry team to have Kayser-Fleichsher rings on their ocular exam, so given the lack of unifying diagnosis with eye findings, paranoid delusions, and catatonia, medicine was consulted to evaluate if patient may have Mp's disease. Serum ceruloplasmin was collected 4/20, pending result.    Patient reports subjectively that he was admitted due to being confused after persistently vivid nightmares and associated headaches. He feels well and was inquiring about discharge.    Interview started with [place]  then aborted requiring recall. 2nd  ID#[id number]    ROS:  Otherwise negative, except as specified in HPI.    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 in [facility]    ALLERGIES:    MEDICATIONS:    VITAL SIGNS:  ICU Vital Signs Last 24 Hrs  T(C): 36.4 (21 Apr 2023 09:07), Max: 36.8 (20 Apr 2023 16:54)  T(F): 97.5 (21 Apr 2023 09:07), Max: 98.2 (20 Apr 2023 16:54)  HR: 84 (21 Apr 2023 09:07) (82 - 84)  BP: 142/88 (21 Apr 2023 09:07) (118/79 - 142/88)  BP(mean): --  ABP: --  ABP(mean): --  RR: 18 (21 Apr 2023 09:07) (18 - 18)  SpO2: 99% (21 Apr 2023 09:07) (99% - 99%)    O2 Parameters below as of 20 Apr 2023 16:54  Patient On (Oxygen Delivery Method): room air          CAPILLARY BLOOD GLUCOSE          PHYSICAL EXAM:  Constitutional: standing waiting for medications then  HEENT: PER, brown cornea without pupillary defect or marked change in color. No Kayser-Fleischer ring appreciated  Neck: supple, no appreciable JVD  Respiratory: CTA B/L, no W/R/R; respirations appear non-labored, conversive in full sentences  Cardiovascular: +S1/S2, RRR  Gastrointestinal: abdomen soft, NT/ND  Extremities: WWP; no clubbing, cyanosis or edema  Vascular: 2+ radial, femoral, and DP/PT pulses B/L  Dermatologic: skin normal color and turgor; no visible rashes  Neurological:     LABS:                        EKG: Reviewed.    RADIOLOGY & ADDITIONAL TESTS: Reviewed.

## 2023-04-21 NOTE — BH INPATIENT PSYCHIATRY PROGRESS NOTE - NSBHASSESSSUMMFT_PSY_ALL_CORE
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 Patient was endorsing paranoid ideation and ideas of reference to family which precipitated his hospital visit. Initially he was admitted to inpatient psychiatry on 04.05.23 but early in admission was found unresponsive on the floor, rapid response was called and patient was transferred to medicine on 04.06.23. He was admitted for medical work-up and suspected infection or seizure disorder from 04.06.23 to 04.11.23. His work up including blood work, urine toxicology, neuroimaging (MRI and CT) plus 24 hr vEEG was not indicative of an organic cause and patient was transferred back to psychiatry on 04.12.23."    Plan:  --Requires 1:1 for management of ADLs on unit at this time given extent of ongoing catatonia  --Continue with standing Lorazepam 2 mg oral at 9 am, 1 pm and 5 pm daily for Catatonia; to be titrated up or down based on response and possibly converted to oral based on the Hernandez jenny scale score  --Continue Olanzapine disintegrating tablet 10 mg nightly for psychotic features.   --Rest of plan per primary team

## 2023-04-21 NOTE — BH INPATIENT PSYCHIATRY PROGRESS NOTE - NSBHFUPINTERVALCCFT_PSY_A_CORE
Back pain  no SI treated for catatonia and psychosis;  on admission suicide level was HIGH ; saw visions of dead relatives telling him to join them ; current risk level is LOW on meidcaitioin Ativan and Zyprexa in the therapeutic milieu.

## 2023-04-21 NOTE — BH INPATIENT PSYCHIATRY PROGRESS NOTE - NSBHMETABOLIC_PSY_ALL_CORE_FT
BMI: BMI (kg/m2): 26.9 (04-18-23 @ 16:50)  HbA1c: A1C with Estimated Average Glucose Result: 5.1 % (04-14-23 @ 07:32)    Glucose: POCT Blood Glucose.: 120 mg/dL (04-08-23 @ 22:15)    BP: 142/88 (04-21-23 @ 09:07) (118/79 - 148/95)  Lipid Panel: Date/Time: 04-14-23 @ 07:32  Cholesterol, Serum: 118  Direct LDL: --  HDL Cholesterol, Serum: 24  Total Cholesterol/HDL Ration Measurement: --  Triglycerides, Serum: 87   BMI: BMI (kg/m2): 26.9 (04-18-23 @ 16:50)  HbA1c: A1C with Estimated Average Glucose Result: 5.1 % (04-14-23 @ 07:32)    Glucose: POCT Blood Glucose.: 120 mg/dL (04-08-23 @ 22:15)    BP: 129/85 (04-21-23 @ 15:24) (118/79 - 148/95)  Lipid Panel: Date/Time: 04-14-23 @ 07:32  Cholesterol, Serum: 118  Direct LDL: --  HDL Cholesterol, Serum: 24  Total Cholesterol/HDL Ration Measurement: --  Triglycerides, Serum: 87

## 2023-04-22 RX ADMIN — Medication 2 MILLIGRAM(S): at 14:05

## 2023-04-22 RX ADMIN — OLANZAPINE 10 MILLIGRAM(S): 15 TABLET, FILM COATED ORAL at 21:31

## 2023-04-22 RX ADMIN — Medication 2 MILLIGRAM(S): at 10:13

## 2023-04-22 RX ADMIN — Medication 650 MILLIGRAM(S): at 21:31

## 2023-04-23 RX ADMIN — Medication 2 MILLIGRAM(S): at 17:04

## 2023-04-23 RX ADMIN — OLANZAPINE 10 MILLIGRAM(S): 15 TABLET, FILM COATED ORAL at 21:26

## 2023-04-23 RX ADMIN — Medication 650 MILLIGRAM(S): at 14:00

## 2023-04-23 RX ADMIN — Medication 650 MILLIGRAM(S): at 21:26

## 2023-04-23 RX ADMIN — Medication 650 MILLIGRAM(S): at 22:26

## 2023-04-23 RX ADMIN — Medication 650 MILLIGRAM(S): at 13:22

## 2023-04-23 RX ADMIN — Medication 2 MILLIGRAM(S): at 11:00

## 2023-04-23 RX ADMIN — Medication 2 MILLIGRAM(S): at 13:23

## 2023-04-24 PROCEDURE — 90832 PSYTX W PT 30 MINUTES: CPT

## 2023-04-24 PROCEDURE — 99232 SBSQ HOSP IP/OBS MODERATE 35: CPT

## 2023-04-24 PROCEDURE — 90785 PSYTX COMPLEX INTERACTIVE: CPT

## 2023-04-24 RX ADMIN — Medication 2 MILLIGRAM(S): at 14:08

## 2023-04-24 RX ADMIN — OLANZAPINE 10 MILLIGRAM(S): 15 TABLET, FILM COATED ORAL at 22:00

## 2023-04-24 RX ADMIN — Medication 2 MILLIGRAM(S): at 10:34

## 2023-04-24 RX ADMIN — Medication 2 MILLIGRAM(S): at 17:55

## 2023-04-24 NOTE — CHART NOTE - NSCHARTNOTEFT_GEN_A_CORE
Admitting Diagnosis:   Patient is a 27y old  Male who presents with a chief complaint of FALL    PSYCHOSIS     (13 Apr 2023 13:37)      PAST MEDICAL & SURGICAL HISTORY:  No pertinent past medical history  No pertinent past medical history  No significant past surgical history      Current Nutrition Order: Regular       PO Intake: Good (%) [   ]  Fair (50-75%) [   ] Poor (<25%) [   ]-unable to assess    GI Issues: none documented    Skin Integrity: no pressure injuries documented      Medications:  MEDICATIONS  (STANDING):  LORazepam     Tablet 2 milliGRAM(s) Oral <User Schedule>  LORazepam   Injectable 2 milliGRAM(s) IntraMuscular once  OLANZapine Disintegrating Tablet 10 milliGRAM(s) Oral at bedtime    MEDICATIONS  (PRN):  acetaminophen     Tablet .. 650 milliGRAM(s) Oral every 6 hours PRN Moderate Pain (4 - 6)  aluminum hydroxide/magnesium hydroxide/simethicone Suspension 30 milliLiter(s) Oral every 4 hours PRN Dyspepsia  OLANZapine 5 milliGRAM(s) Oral every 8 hours PRN Agitation  traZODone 50 milliGRAM(s) Oral at bedtime PRN Sleep      Weight:  Height for BMI (FEET)	5 Feet  Height for BMI (INCHES)	9 Inch(s)  Height for BMI (CENTIMETERS)	175.26 Centimeter(s)  Weight for BMI (lbs)	178.6 lb  Weight for BMI (kg)	81 kg  Body Mass Index	26.3    Weight Change: no new weights to assess    Estimated energy needs:   Estimated Energy Needs Weight (lbs)	178.6 lb  Estimated Energy Needs Weight (kg)	81 kg  Estimated Energy Needs From (pedro/kg)	25  Estimated Energy Needs To (pedro/kg)	30  Estimated Energy Needs Calculated From (pedro/kg)	2025  Estimated Energy Needs Calculated To (pedro/kg)	2430    Estimated Protein Needs Weight (lbs)	178.6 lb  Estimated Protein Needs Weight (kg)	81 kg  Estimated Protein Needs From (g/kg)	0.8  Estimated Protein Needs To (g/kg)	1  Estimated Protein Needs Calculated From (g/kg)	64.8  Estimated Protein Needs Calculated To (g/kg)	81    Subjective: Patient interviewed with assistance of  line as patient is predominately Tajik speaking. Writer has previously assessed patient on consult services and part of the information in this note is taken from my consult note.  He is a 27 year old man from [facility] presents with low motor activity, rigidity, hypertonia, and delayed responses to questions about why he is in the hospital - i.e., responded with a deep sigh, and searching eyes when questioned about this by us (in his native language, using  services). At times, he responded to questions involubly, and his speech was hard to make out at the bedside - the  said some responses were gibberish. Lack of response to questions about paranoia or auditory hallucinations. Responses were limited to one or a few words, and appropriate to the topic.     Unable to conduct RD interview, pt in care x2 attempts. Current diet order: regular. Per psychiatry note on 4/17, pt reported good appetite. No GI issues documented. Conduct RD interview upon follow up.     Previous Nutrition Diagnosis: Inadequate oral intake r/t pt's condition AEB only consumed juice at breakfast    Active [   ]  Resolved [   ]    Goal: Pt to meet at least 75% of nutritional needs consistently     Recommendations:  1. Continue with diet order   2. Encourage pt to meed nutritional needs as able   3. Monitor labs: electrolytes, cmp  4. Monitor weights   5. Pain and bowel regimen per team   6. Will continue to assess/honor food preferences as able  7. Obtain diet and weight history upon f/u    Education: deferred at this time    Risk Level: High [ x  ] Moderate [   ] Low [   ]
Admitting Diagnosis:   Patient is a 27y old  Male who presents with a chief complaint of Catatonia (21 Apr 2023 11:30)      PAST MEDICAL & SURGICAL HISTORY:  No pertinent past medical history  No pertinent past medical history  No significant past surgical history      Current Nutrition Order: regular       PO Intake: Good (%) [   ]  Fair (50-75%) [ x  ] Poor (<25%) [   ]    GI Issues: none documented    Skin Integrity: no pressure injuries documented    Medications:  MEDICATIONS  (STANDING):  LORazepam     Tablet 2 milliGRAM(s) Oral <User Schedule>  LORazepam   Injectable 2 milliGRAM(s) IntraMuscular once  OLANZapine Disintegrating Tablet 10 milliGRAM(s) Oral at bedtime    MEDICATIONS  (PRN):  acetaminophen     Tablet .. 650 milliGRAM(s) Oral every 6 hours PRN Moderate Pain (4 - 6)  aluminum hydroxide/magnesium hydroxide/simethicone Suspension 30 milliLiter(s) Oral every 4 hours PRN Dyspepsia  OLANZapine 5 milliGRAM(s) Oral every 8 hours PRN Agitation  traZODone 50 milliGRAM(s) Oral at bedtime PRN Sleep      Weight:  Height for BMI (FEET)	5 Feet  Height for BMI (INCHES)	9 Inch(s)  Height for BMI (CENTIMETERS)	175.26 Centimeter(s)  Weight for BMI (lbs)	178.6 lb  Weight for BMI (kg)	81 kg  Body Mass Index	26.3    Weight Change: no new weights to assess    Estimated energy needs:   Estimated Energy Needs Weight (lbs)	178.6 lb  Estimated Energy Needs Weight (kg)	81 kg  Estimated Energy Needs From (pedro/kg)	25  Estimated Energy Needs To (pedro/kg)	30  Estimated Energy Needs Calculated From (pedro/kg)	2025  Estimated Energy Needs Calculated To (pedro/kg)	2430    Estimated Protein Needs Weight (lbs)	178.6 lb  Estimated Protein Needs Weight (kg)	81 kg  Estimated Protein Needs From (g/kg)	0.8  Estimated Protein Needs To (g/kg)	1  Estimated Protein Needs Calculated From (g/kg)	64.8  Estimated Protein Needs Calculated To (g/kg)	81    Subjective: Patient interviewed with assistance of  line as patient is predominately Citizen of [place] speaking. Writer has previously assessed patient on consult services and part of the information in this note is taken from my consult note.  He is a 27 year old man from [facility] presents with low motor activity, rigidity, hypertonia, and delayed responses to questions about why he is in the hospital - i.e., responded with a deep sigh, and searching eyes when questioned about this by us (in his native language, using  services). At times, he responded to questions involubly, and his speech was hard to make out at the bedside - the  said some responses were gibberish. Lack of response to questions about paranoia or auditory hallucinations. Responses were limited to one or a few words, and appropriate to the topic.     Unable to conduct RD interview, pt sleeping, unarousable. Current diet order: regular. Per  note on 4/21, pt reports typical diet consists of french fries, burger, Red Bull, Coca Cola, or Snapple, home food for dinner. No GI issues documented. RD to f/u prn.    Previous Nutrition Diagnosis: Inadequate oral intake r/t pt's condition AEB only consumed juice at breakfast    Active [   ]  Resolved [ x  ]    If resolved, new PES: Undesirable food choices r/t lack of prior nutrition education AEB typical diet consisting of french fries, burger, Red Bull, Coca Cola, or Snapple.    Goal: Pt to meet at least 75% of nutritional needs consistently     Recommendations:  1. Continue with diet order   2. Encourage pt to meed nutritional needs as able   3. Monitor labs: electrolytes, cmp  4. Monitor weights   5. Pain and bowel regimen per team   6. Will continue to assess/honor food preferences as able  7. Obtain diet and weight history upon f/u    Education: deferred at this time    Risk Level: High [  x ] Moderate [   ] Low [   ]

## 2023-04-24 NOTE — BH INPATIENT PSYCHIATRY PROGRESS NOTE - NSBHATTESTCOMMENTATTENDFT_PSY_A_CORE
;;04/20: "I understabd" re need for US and lab work; mood is good; social with parents; smiles ; good range of affect. No SI.  Ceruloplasmin, Serum (04.21.23 @ 07:17) ; Ceruloplasmin, Serum: 15 mg/dL  await US but unlikely has Mp's disease.     ;;04/24: reviewed material obtained by student; patient is currently euthymic but slightly anxious;  "Good morning" smiles; Not endorsing  suicidal or homicidal ideation intent or plans; no mention of auditory or visual hallucinations Alert; oriented; cognition intact; speech clear; no tremor or evidence of movement impairment. Focused on discharge; wants to leave soon.   Transition to aftercare on current medication regime.

## 2023-04-24 NOTE — BH INPATIENT PSYCHIATRY PROGRESS NOTE - NSBHFUPINTERVALHXFT_PSY_A_CORE
Patient reports that he feels good; sleep, appetite are good. Pain is minimal, his back feels ok. We reviewed the test results from Last Friday ([facility]). Patient was asked whether he took too many Advil (ibuprofen) before falling in the apartment in [place]. He said during a conversation with  #[id number], "To be honest, I took too many Advil." He cannot remember how many he took. His brother Arturo Reeder was present during visiting hours and said that the family counted the pills missing from the bottle. They believe the patient took 30 tablets before coming to the ED. He had visions of his cousin and grandmother inviting [facility] to [facility] while he was delirious (the day after being admitted to the ED). He says he wants help not thinking about those things because they make him feel sicker. He took Advil to deal with headaches which could have been made worse because he was not sleeping well (staying up late for hours on end to keep in touch with his wife (of 1 year) in [facility], they sometimes got into fights, but would get over those quickly - and he says he doesn't blame her for what happened. He just misses her. And wants to go see her in [facility] when he gets out of the hospital.) He says he does not have AH/VH now. It's unclear whether he did before. However he did have a fear that someone was following him before he came into the hospital. He does not have that fear right now. In a later conversation with  #[id number], he said he does not want to speak to a therapist about his "stress", but would like education about dealing with stress (for example, alternatives to using Advil). He said "For the moment I have no need of hurting myself or other people...,God forbid." He promises not to take Advils again like he did before if he is stress, tired, etc. He did complain to the  that he had a hard time hearing because he heard 4 people on the phone. We shared the same phone and I could hear no other voices or background noise.    Patient and family want to know whether there will need to be medicines when he goes home - and what they are for. Discussed with patient that medications need to be taken carefully, according to prescribed instructions. We discussed importance of self-care and healthy habits. Family is concerned that if he leaves to go to [facility] to visit his wife, he will not have a doctor to monitor him. also, brother Arturo Reeder said that 1 year ago the patient had a fall in [facility] and was seen at [facility]. Medicines were given, but unclear the name or diagnosis. Patient confirmed that he was seen in [facility] for falling down 1 year ago. Patient reports that he feels good; sleep, appetite are good. Pain is minimal, his back feels ok. We reviewed the test results from last Friday ([facility]). Patient was asked whether he took too many Advil (ibuprofen) before falling in the apartment in [place]. He said during a conversation with  #[id number], "To be honest, I took too many Advil." He cannot remember how many he took. His brother Arturo Reeder was present during visiting hours and said that the family counted the pills missing from the bottle. The brother believes the patient took 30 tablets before coming to the ED. The patient had visions of his dead cousin (10 years ago - lived in [facility]) and grandmother inviting him to join them in [facility] while he was delirious (the day after being admitted to the ED). He says he wants help not thinking about those things because they make him feel sicker.     He had been taking Advil regularly over the last year to deal with headaches which could have been made worse because he was not sleeping well (staying up late for hours on end to keep in touch with his wife (of 1 year) in [facility].) He and his wife sometimes got into fights over the phone, but would get over those conflicts quickly - and he says he doesn't blame her for what happened. He just misses her and wants to go see her in [facility] when he gets out of the hospital. He says he does not have AH/VH now. It's unclear whether he did before. In a later conversation with  #[id number], he did complain to the  that he had a hard time hearing because he heard 4 people on the phone. We shared the same phone and I could hear no other voices or background noise.    He did have a fear that someone was following him before he came into the hospital. He does not have that fear right now.     In the future, he said he does not want to speak to a therapist about his "stress", but would like education about dealing with stress (for example, alternatives to using Advil). He said "For the moment I have no need of hurting myself or other people..., God forbid." He promises not to take Advil again like he did before if he is stress, tired, etc.     Patient and family want to know whether there will need to be medicines when he goes home - and what they are for. Discussed with patient that medications need to be taken carefully, according to prescribed instructions. We discussed importance of self-care and healthy habits. Family is concerned that if he leaves to go to [facility] to visit his wife, he will not have a doctor to monitor him (medicines, eating on time, neglecting himself, history of falls). The patient's brother Arturo Reeder said that 1 year ago the patient had a fall in [facility] and was seen at [facility]. Medicines were given, but unclear the name or diagnosis. Patient confirmed that he was seen in [facility] for falling down 1 year ago. Brother also said that the patient had used Advil for pain after a breakup with a girlfriend 2-3 years ago. Family is concerned about patient's safety around taking medicines and his ability to take care of himself (not neglect his meals or have repeat episodes of falling down as he did 1 year ago in [facility] and twice within the last month.)

## 2023-04-24 NOTE — BH PSYCHOLOGY - CLINICIAN PSYCHOTHERAPY NOTE - NSBHPSYCHOLBILLIND_PSY_A_CORE
44264 - Interactive Complexity (Add on code for maladaptive communication, emotional/behavioral conditions, mandated reporting or equipment/device/ services)

## 2023-04-24 NOTE — BH PSYCHOLOGY - CLINICIAN PSYCHOTHERAPY NOTE - NSBHPSYCHOLADDL_PSY_A_CORE
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  Static factors: Recent suicide attempt  Modifiable: Recent catatonia,inconsistent reports of taking meds to staff  Supportive: , supportive family.    Moderate risk due to recent catatonia, and inconsistent reports to some staff that he had taken meds.

## 2023-04-24 NOTE — BH INPATIENT PSYCHIATRY PROGRESS NOTE - NSBHASSESSSUMMFT_PSY_ALL_CORE
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 Patient was endorsing paranoid ideation and ideas of reference to family which precipitated his hospital visit. Initially he was admitted to inpatient psychiatry on 04.05.23 but early in admission was found unresponsive on the floor, rapid response was called and patient was transferred to medicine on 04.06.23. He was admitted for medical work-up and suspected infection or seizure disorder from 04.06.23 to 04.11.23. His work up including blood work, urine toxicology, neuroimaging (MRI and CT) plus 24 hr vEEG was not indicative of an organic cause and patient was transferred back to psychiatry on 04.12.23."    Plan:  --Requires 1:1 for management of ADLs on unit at this time given extent of ongoing catatonia  --Continue with standing Lorazepam 2 mg oral at 9 am, 1 pm and 5 pm daily for Catatonia; to be titrated up or down based on response and possibly converted to oral based on the Hernandez jenny scale score  --Continue Olanzapine disintegrating tablet 10 mg nightly for psychotic features.   --Rest of plan per primary team

## 2023-04-24 NOTE — BH PSYCHOLOGY - CLINICIAN PSYCHOTHERAPY NOTE - TOKEN PULL-DIAGNOSIS
Primary Diagnosis:  Catatonia [F06.1]        Problem Dx:   MDD (major depressive disorder), single episode, severe with psychosis [F32.3]      Schizophrenia [F20.9]      
Primary Diagnosis:  Catatonia [F06.1]        Problem Dx:   MDD (major depressive disorder), single episode, severe with psychosis [F32.3]      Schizophrenia [F20.9]

## 2023-04-24 NOTE — BH INPATIENT PSYCHIATRY PROGRESS NOTE - NSBHMETABOLIC_PSY_ALL_CORE_FT
BMI: BMI (kg/m2): 26.9 (04-18-23 @ 16:50)  HbA1c: A1C with Estimated Average Glucose Result: 5.1 % (04-14-23 @ 07:32)    Glucose: POCT Blood Glucose.: 120 mg/dL (04-08-23 @ 22:15)    BP: 123/85 (04-24-23 @ 16:06) (123/85 - 135/90)  Lipid Panel: Date/Time: 04-14-23 @ 07:32  Cholesterol, Serum: 118  Direct LDL: --  HDL Cholesterol, Serum: 24  Total Cholesterol/HDL Ration Measurement: --  Triglycerides, Serum: 87

## 2023-04-24 NOTE — BH PSYCHOLOGY - CLINICIAN PSYCHOTHERAPY NOTE - NSBHPSYCHOLNARRATIVE_PSY_A_CORE FT
The patient appears stated age, fair hygiene and dressed appropriately.  The patient was calm, cooperative with the interview and maintained appropriate eye contact.  No psychomotor agitation or retardation noted.    The patient's speech was fluent, normal in tone, rate, and volume, speech is in Greek.  The patient's mood is "good."  Affect is constricted, stable and appropriate.  Denies any delusions or hallucinations. Denies any suicidal or homicidal ideation, intent, or plan.  Insight is fair.  Judgment is fair.  Impulse control has been fair on the unit.  Met with Mr. Reeder for CAMS assessment with use of  line. He reported that he does not feel suicidal and has never been, when asked about the 30 pills of ibuphrophen he took, he reported that he took them because he had a headache, reporting that he has not been feeling suicidal at all, and throughout the session, he stated that he is not suicidal, and never was several times. When asked about psychological pain, he was unable to tell, but reported that he "I don't want to kill myself". Stress was 1/5 and "just trying to make things better, get into my own home". Agitation is 1/5 and "I did not do anything, I took alcohol and came here. Hopelessness is denied, and self hate is also denied. He reported that overall risk of suicide is 1/5. Reasons for living "to live, to work, to have a nice life and everything that can make him live a nice life". Denied any reasons for dying. Wish to live is 8/8, wish to die is 0/8. When asked what would be one thing that would help him no longer be suicidal, he reported that "I don't want to commit suicide." When asked for what would be helpful for him when he gets discharged, he reported that being with his fiance would be helpful for him. At all points of the session, Mr. Reeder denied Suicidal ideation, intent. 
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    Pt was received in the milieu. Euthymic mood and congruent affect, well-related, good eye contact, hesitant on approach because he didn't understand the nature of the meeting but easily calmed when it was explained to him that the writer was part of his treatement team. No positive psychotic symptoms observed or reported, however there was tangentiality and clear guardedness when the pt was asked questions in more detail. Pt spoke in English, fully attempts to communicate but reported [place]uran is his primary language. He denied wanting a  to be present for the session.      Pt reported that "stress" from working 7 days a week, and only sleeping 3-4 hours a night, brought him to the hospital. Upon further questioning about what was stressful, pt began to tell the writer to ask his brother, seemed to get more disorganized, and he began to speak in [place]. He denies having had an episode like of stress this before. Pt reported that he wants to leave the hospital and move to [place], and was adamant that he is "better" and "fine" now.     Pt additionally reported that he came to the [place] in [place] and reported that he has a green card.    Pt is not endorsing suicidal or homicidal ideation intent or plans. Moderate risk level due to recent catatonia and inconsistent reporting to different staff, no pas psychiatric history.

## 2023-04-24 NOTE — BH INPATIENT PSYCHIATRY PROGRESS NOTE - NSBHCHARTREVIEWVS_PSY_A_CORE FT
Vital Signs Last 24 Hrs  T(C): 36.6 (04-24-23 @ 16:06), Max: 36.6 (04-24-23 @ 08:15)  T(F): 97.9 (04-24-23 @ 16:06), Max: 97.9 (04-24-23 @ 16:06)  HR: 95 (04-24-23 @ 16:06) (81 - 95)  BP: 123/85 (04-24-23 @ 16:06) (123/85 - 130/92)  BP(mean): --  RR: 20 (04-24-23 @ 16:06) (18 - 20)  SpO2: 98% (04-24-23 @ 16:06) (98% - 99%)     Vital Signs Last 24 Hrs  T(C): 36.6 (04-24-23 @ 16:06), Max: 36.6 (04-24-23 @ 16:06)  T(F): 97.9 (04-24-23 @ 16:06), Max: 97.9 (04-24-23 @ 16:06)  HR: 95 (04-24-23 @ 16:06) (95 - 95)  BP: 123/85 (04-24-23 @ 16:06) (123/85 - 123/85)  BP(mean): --  RR: 20 (04-24-23 @ 16:06) (20 - 20)  SpO2: 98% (04-24-23 @ 16:06) (98% - 98%)

## 2023-04-24 NOTE — BH INPATIENT PSYCHIATRY PROGRESS NOTE - NSBHFUPINTERVALCCFT_PSY_A_CORE
Patient is hearing four voices on the phone with  and reverberations. Needs a safety plan about medication use and self-care before being discharged.  On admission suicide level was HIGH ; saw visions of dead relatives telling him to join them ; current risk level is LOW on meidcatiin Ativan and Zyprexa in the therapeutic milieu. Patient complains of hearing four voices on the phone with  and reverberations. Needs a safety plan about medication use and self-care before being discharged.  On admission suicide level was HIGH ; saw visions of dead relatives telling him to join them ; current risk level is LOW on medications Ativan and Zyprexa in the therapeutic milieu.

## 2023-04-24 NOTE — BH INPATIENT PSYCHIATRY PROGRESS NOTE - OTHER
Does admit to some difficulty remembering the events around his recent fall(s) - concern for unwitnessed seizures? feels good and able to manage, is anxious about getting back to work and his family Complained of hearing 4 voices on the phone while speaking to . Has sometimes indicated that he is having difficulty understanding interpreters on previous phone calls. First time an  has clarified what type of difficulty the patient has been having.

## 2023-04-25 PROCEDURE — 99232 SBSQ HOSP IP/OBS MODERATE 35: CPT

## 2023-04-25 RX ADMIN — Medication 2 MILLIGRAM(S): at 13:37

## 2023-04-25 RX ADMIN — OLANZAPINE 10 MILLIGRAM(S): 15 TABLET, FILM COATED ORAL at 21:19

## 2023-04-25 RX ADMIN — Medication 2 MILLIGRAM(S): at 11:05

## 2023-04-25 RX ADMIN — Medication 2 MILLIGRAM(S): at 18:11

## 2023-04-25 NOTE — BH INPATIENT PSYCHIATRY PROGRESS NOTE - OTHER
Complained of hearing 4 voices on the phone while speaking to . Has sometimes indicated that he is having difficulty understanding interpreters on previous phone calls. First time an  has clarified what type of difficulty the patient has been having. Does admit to some difficulty remembering the events around his recent fall(s) - concern for unwitnessed seizures? feels good and able to manage, is anxious about getting back to work and his family

## 2023-04-25 NOTE — BH TREATMENT PLAN - NSTXDISORGINTERRN_PSY_ALL_CORE
Promote sleep stability; provide prolonged periods of undisturbed rest in a quiet, dark environment; consider cycled lighting .
Promote sleep stability; provide prolonged periods of undisturbed rest in a quiet, dark environment; consider cycled lighting .

## 2023-04-25 NOTE — BH INPATIENT PSYCHIATRY PROGRESS NOTE - NSTXDISORGINTERMD_PSY_ALL_CORE
Start standing treatment for Catatonia
Start standing treatment for Catatonia
Started standing treatment for Catatonia, antipsychotic also started. psychopharm management x 15 min daily. will see if patient has been using substances that don't come up on utox (such as K2) as siblings feel he has been using something.
Started standing treatment for Catatonia, antipsychotic also started. psychopharm management x 15 min daily. will see if patient has been using substances that don't come up on utox (such as K2) as siblings feel he has been using something.
Start standing treatment for Catatonia
Started standing treatment for Catatonia, antipsychotic also started. psychopharm management x 15 min daily. will see if patient has been using substances that don't come up on utox (such as K2) as siblings feel he has been using something.
Started standing treatment for Catatonia, antipsychotic also started. psychopharm management x 15 min daily. will see if patient has been using substances that don't come up on utox (such as K2) as siblings feel he has been using something.
Start standing treatment for Catatonia
Start standing treatment for Catatonia
Started standing treatment for Catatonia, antipsychotic also started. psychopharm management x 15 min daily. will see if patient has been using substances that don't come up on utox (such as K2) as siblings feel he has been using something.
Started standing treatment for Catatonia, antipsychotic also started. psychopharm management x 15 min daily. will see if patient has been using substances that don't come up on utox (such as K2) as siblings feel he has been using something.

## 2023-04-25 NOTE — BH INPATIENT PSYCHIATRY PROGRESS NOTE - NSTXSLFCREDATETRGT_PSY_ALL_CORE
19-Apr-2023
19-Apr-2023
26-Apr-2023
19-Apr-2023
19-Apr-2023
26-Apr-2023
19-Apr-2023
11
26-Apr-2023
19-Apr-2023

## 2023-04-25 NOTE — BH INPATIENT PSYCHIATRY PROGRESS NOTE - NSTXPSYCHOGOAL_PSY_ALL_CORE
Will be able to report experiencing hallucinations to staff

## 2023-04-25 NOTE — BH INPATIENT PSYCHIATRY PROGRESS NOTE - NSTXSLFCREGOAL_PSY_ALL_CORE
Be able to describe elements of self-care and a plan to attend to these elements

## 2023-04-25 NOTE — BH INPATIENT PSYCHIATRY PROGRESS NOTE - NSBHATTESTBILLING_PSY_A_CORE
11487-Pkvuzaswuf OBS or IP - low complexity OR 25-34 mins
10933-Sxybqtdgrr - low complexity OR 20-29 mins
72915-Seqfzpvjgz OBS or IP - moderate complexity OR 35-49 mins
33684-Vqkrhdhaov OBS or IP - moderate complexity OR 35-49 mins
38212-Hnohdxmnbv OBS or IP - moderate complexity OR 35-49 mins
49214-Ualnjqvaty OBS or IP - moderate complexity OR 35-49 mins
89395-Wuqtdedimv OBS or IP - moderate complexity OR 35-49 mins
43304-Phtlmmvmto OBS or IP - moderate complexity OR 35-49 mins
60751-Enbelyoxzz OBS or IP - moderate complexity OR 35-49 mins
88176-Tuaoljitup OBS or IP - moderate complexity OR 35-49 mins
33111-Spsohcljah OBS or IP - moderate complexity OR 35-49 mins

## 2023-04-25 NOTE — BH INPATIENT PSYCHIATRY PROGRESS NOTE - NSDCCRITERIA_PSY_ALL_CORE
Able to care for basic needs 

## 2023-04-25 NOTE — BH INPATIENT PSYCHIATRY PROGRESS NOTE - NSBHMSEKNOW_PSY_A_CORE
Unable to assess

## 2023-04-25 NOTE — BH INPATIENT PSYCHIATRY PROGRESS NOTE - NSBHMSEINTELL_PSY_A_CORE
Unable to assess
Unable to assess
Average
Unable to assess
Average
Unable to assess
Average
Unable to assess

## 2023-04-25 NOTE — BH INPATIENT PSYCHIATRY PROGRESS NOTE - NSTXDISORGGOAL_PSY_ALL_CORE
Will demonstrate purposeful and predictable thoughts/behaviors by making a request
Will demonstrate related thoughts for 5 min in conversation
Will demonstrate related thoughts for 5 min in conversation
Will demonstrate purposeful and predictable thoughts/behaviors by making a request
Will demonstrate related thoughts for 5 min in conversation
Will demonstrate purposeful and predictable thoughts/behaviors by making a request
Will demonstrate purposeful and predictable thoughts/behaviors by making a request
Will demonstrate related thoughts for 5 min in conversation
Will demonstrate purposeful and predictable thoughts/behaviors by making a request
Will demonstrate purposeful and predictable thoughts/behaviors by making a request
Will demonstrate related thoughts for 5 min in conversation

## 2023-04-25 NOTE — BH INPATIENT PSYCHIATRY PROGRESS NOTE - NSBHMETABOLIC_PSY_ALL_CORE_FT
BMI: BMI (kg/m2): 26.9 (04-18-23 @ 16:50)  HbA1c: A1C with Estimated Average Glucose Result: 5.1 % (04-14-23 @ 07:32)    Glucose: POCT Blood Glucose.: 120 mg/dL (04-08-23 @ 22:15)    BP: 145/96 (04-25-23 @ 17:14) (123/85 - 145/96)  Lipid Panel: Date/Time: 04-14-23 @ 07:32  Cholesterol, Serum: 118  Direct LDL: --  HDL Cholesterol, Serum: 24  Total Cholesterol/HDL Ration Measurement: --  Triglycerides, Serum: 87

## 2023-04-25 NOTE — BH INPATIENT PSYCHIATRY PROGRESS NOTE - NSTXPSYCHODATETRGT_PSY_ALL_CORE
19-Apr-2023
19-Apr-2023
26-Apr-2023
19-Apr-2023
19-Apr-2023
26-Apr-2023
19-Apr-2023

## 2023-04-25 NOTE — BH INPATIENT PSYCHIATRY PROGRESS NOTE - NSTXPSYCHOPROGRES_PSY_ALL_CORE
Improving
No Change
Improving
Improving
No Change
Improving
Improving
No Change
No Change

## 2023-04-25 NOTE — BH INPATIENT PSYCHIATRY PROGRESS NOTE - NSTXDISORGDATEEST_PSY_ALL_CORE
12-Apr-2023
12-Apr-2023
19-Apr-2023
19-Apr-2023
12-Apr-2023
19-Apr-2023
12-Apr-2023

## 2023-04-25 NOTE — BH INPATIENT PSYCHIATRY PROGRESS NOTE - NSBHFUPINTERVALHXFT_PSY_A_CORE
Sleep  appetite ok; no pain issues. Not endorsing  suicidal or homicidal ideation intent or plans; no mention of auditory or visual hallucinations ; cheerful; pleasant with family anticipating leaving tomorrow;

## 2023-04-25 NOTE — BH TREATMENT PLAN - NSTXPLANTHERAPYSESSIONSFT_PSY_ALL_CORE
04-12-23  Type of therapy: Coping skills,Creative arts therapy,Daily living skills,Psychoeducation,Safety planning,Social skills training,Stress management,Symptom management  Type of session: Group  --  --  --  --  

## 2023-04-25 NOTE — BH INPATIENT PSYCHIATRY PROGRESS NOTE - NSBHCHARTREVIEWVS_PSY_A_CORE FT
Vital Signs Last 24 Hrs  T(C): 36.5 (04-25-23 @ 17:14), Max: 36.5 (04-25-23 @ 09:10)  T(F): 97.7 (04-25-23 @ 17:14), Max: 97.7 (04-25-23 @ 09:10)  HR: 106 (04-25-23 @ 17:14) (93 - 106)  BP: 145/96 (04-25-23 @ 17:14) (130/89 - 145/96)  BP(mean): --  RR: 18 (04-25-23 @ 09:10) (18 - 18)  SpO2: 97% (04-25-23 @ 17:14) (97% - 98%)

## 2023-04-25 NOTE — BH INPATIENT PSYCHIATRY PROGRESS NOTE - PRN MEDS
MEDICATIONS  (PRN):  acetaminophen     Tablet .. 650 milliGRAM(s) Oral every 6 hours PRN Moderate Pain (4 - 6)  aluminum hydroxide/magnesium hydroxide/simethicone Suspension 30 milliLiter(s) Oral every 4 hours PRN Dyspepsia  OLANZapine 5 milliGRAM(s) Oral every 8 hours PRN Agitation  traZODone 50 milliGRAM(s) Oral at bedtime PRN Sleep  

## 2023-04-25 NOTE — BH INPATIENT PSYCHIATRY PROGRESS NOTE - NSTXPROBSLFCRE_PSY_ALL_CORE
SELF-CARE DEFICIT

## 2023-04-25 NOTE — BH INPATIENT PSYCHIATRY PROGRESS NOTE - NSICDXBHPRIMARYDX_PSY_ALL_CORE
Catatonia   F06.1  

## 2023-04-25 NOTE — BH INPATIENT PSYCHIATRY PROGRESS NOTE - NSTXDISORGPROGRES_PSY_ALL_CORE
Improving
No Change
Improving

## 2023-04-25 NOTE — BH INPATIENT PSYCHIATRY PROGRESS NOTE - NSTXSLFCREDATEEST_PSY_ALL_CORE
12-Apr-2023

## 2023-04-25 NOTE — BH SAFETY PLAN - WARNING SIGN 1
Pt completed a written safety plan with help of  line,  # [id number]. Pt received a copy of the safety plan and an additional copy has been filed in Pt's chart on the unit.

## 2023-04-25 NOTE — BH TREATMENT PLAN - NSTXPSYCHOINTERMD_PSY_ALL_CORE
Start standing treatment for psychosis

## 2023-04-25 NOTE — BH INPATIENT PSYCHIATRY PROGRESS NOTE - NSBHFUPINTERVALCCFT_PSY_A_CORE
Patient complains of hearing four voices on the phone with  and reverberations. Needs a safety plan about medication use and self-care before being discharged.  On admission suicide level was HIGH ; saw visions of dead relatives telling him to join them ; current risk level is LOW on medications Ativan and Zyprexa in the therapeutic milieu.

## 2023-04-25 NOTE — BH INPATIENT PSYCHIATRY PROGRESS NOTE - NSBHMSEGAIT_PSY_A_CORE
Unable to assess
Normal gait / station
Unable to assess
Normal gait / station
Normal gait / station
Unable to assess
Normal gait / station
Unable to assess
Unable to assess

## 2023-04-25 NOTE — BH INPATIENT PSYCHIATRY PROGRESS NOTE - NSBHMSERECMEM_PSY_A_CORE
Normal
Normal
Unable to assess
Normal
Unable to assess
Normal
Unable to assess

## 2023-04-25 NOTE — BH INPATIENT PSYCHIATRY PROGRESS NOTE - NSBHASSESSSUMMFT_PSY_ALL_CORE
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 Patient was endorsing paranoid ideation and ideas of reference to family which precipitated his hospital visit. Initially he was admitted to inpatient psychiatry on 04.05.23 but early in admission was found unresponsive on the floor, rapid response was called and patient was transferred to medicine on 04.06.23. He was admitted for medical work-up and suspected infection or seizure disorder from 04.06.23 to 04.11.23. His work up including blood work, urine toxicology, neuroimaging (MRI and CT) plus 24 hr vEEG was not indicative of an organic cause and patient was transferred back to psychiatry on 04.12.23."    Plan:  --Requires 1:1 for management of ADLs on unit at this time given extent of ongoing catatonia  --Continue with standing Lorazepam 2 mg oral at 9 am, 1 pm and 5 pm daily for Catatonia; to be titrated up or down based on response and possibly converted to oral based on the Hernandez jenny scale score  --Continue Olanzapine disintegrating tablet 10 mg nightly for psychotic features.   --Rest of plan per primary team    ;;04/25: Sleep  appetite ok; no pain issues. Not endorsing  suicidal or homicidal ideation intent or plans; no mention of auditory or visual hallucinations ; cheerful; pleasant with family anticipating leaving tomorrow;

## 2023-04-25 NOTE — BH TREATMENT PLAN - NSTXDISORGINTERMD_PSY_ALL_CORE
Started standing treatment for Catatonia, antipsychotic also started. psychopharm management x 15 min daily. will see if patient has been using substances that don't come up on utox (such as K2) as siblings feel he has been using something.

## 2023-04-25 NOTE — BH TREATMENT PLAN - NSTXPATIENTPARTICIPATE_PSY_ALL_CORE
Patient participated in identification of needs/problems/goals for treatment/Patient participated in defining interventions
No, patient unwilling to participate
Patient participated in identification of needs/problems/goals for treatment/Patient participated in defining interventions

## 2023-04-25 NOTE — BH INPATIENT PSYCHIATRY PROGRESS NOTE - NSTXSLFCREPROGRES_PSY_ALL_CORE
Improving
Improving
No Change
No Change
Improving
No Change
Improving

## 2023-04-25 NOTE — BH TREATMENT PLAN - NSTXPSYCHOINTERRN_PSY_ALL_CORE
encourage patient to report any hallucinations to staff and to request prn medication for hallucination
encourage patient to ask for prn medication when experiencing hallucination
encourage patient to report any hallucinations to staff and to request prn medication for hallucination

## 2023-04-25 NOTE — BH TREATMENT PLAN - NSTXSLFCREGOAL_PSY_ALL_CORE
Be able to describe elements of self-care and a plan to attend to these elements

## 2023-04-25 NOTE — BH TREATMENT PLAN - NSTXDISORGGOAL_PSY_ALL_CORE
Will demonstrate purposeful and predictable thoughts/behaviors by making a request
Will demonstrate purposeful and predictable thoughts/behaviors by making a request
Will demonstrate related thoughts for 5 min in conversation

## 2023-04-25 NOTE — BH INPATIENT PSYCHIATRY PROGRESS NOTE - NSTXPSYCHOINTERMD_PSY_ALL_CORE
Start standing treatment for psychosis

## 2023-04-25 NOTE — BH TREATMENT PLAN - NSTXPSYCHOGOAL_PSY_ALL_CORE
Will be able to report experiencing hallucinations to staff

## 2023-04-25 NOTE — BH TREATMENT PLAN - NSTXPSYCHOINTERPR_PSY_ALL_CORE
Pt will be invited and encouraged to attend all offered groups

## 2023-04-25 NOTE — BH INPATIENT PSYCHIATRY PROGRESS NOTE - NSTXDISORGDATETRGT_PSY_ALL_CORE
19-Apr-2023
26-Apr-2023
19-Apr-2023
26-Apr-2023
26-Apr-2023
19-Apr-2023
26-Apr-2023
19-Apr-2023
26-Apr-2023

## 2023-04-26 VITALS
TEMPERATURE: 99 F | RESPIRATION RATE: 18 BRPM | DIASTOLIC BLOOD PRESSURE: 95 MMHG | HEART RATE: 90 BPM | SYSTOLIC BLOOD PRESSURE: 139 MMHG | OXYGEN SATURATION: 97 %

## 2023-04-26 PROCEDURE — 80061 LIPID PANEL: CPT

## 2023-04-26 PROCEDURE — 97116 GAIT TRAINING THERAPY: CPT

## 2023-04-26 PROCEDURE — 36415 COLL VENOUS BLD VENIPUNCTURE: CPT

## 2023-04-26 PROCEDURE — 83036 HEMOGLOBIN GLYCOSYLATED A1C: CPT

## 2023-04-26 PROCEDURE — 82390 ASSAY OF CERULOPLASMIN: CPT

## 2023-04-26 PROCEDURE — 97161 PT EVAL LOW COMPLEX 20 MIN: CPT

## 2023-04-26 PROCEDURE — 99239 HOSP IP/OBS DSCHRG MGMT >30: CPT | Mod: GC

## 2023-04-26 PROCEDURE — 97110 THERAPEUTIC EXERCISES: CPT

## 2023-04-26 RX ORDER — OLANZAPINE 15 MG/1
1 TABLET, FILM COATED ORAL
Qty: 0 | Refills: 0
Start: 2023-04-26

## 2023-04-26 RX ORDER — OLANZAPINE 15 MG/1
1 TABLET, FILM COATED ORAL
Qty: 15 | Refills: 0
Start: 2023-04-26 | End: 2023-05-10

## 2023-04-26 RX ORDER — OLANZAPINE 15 MG/1
1 TABLET, FILM COATED ORAL
Qty: 1 | Refills: 0
Start: 2023-04-26 | End: 2023-05-10

## 2023-04-26 RX ADMIN — Medication 650 MILLIGRAM(S): at 15:46

## 2023-04-26 RX ADMIN — Medication 650 MILLIGRAM(S): at 12:21

## 2023-04-26 RX ADMIN — Medication 2 MILLIGRAM(S): at 10:19

## 2023-04-26 RX ADMIN — Medication 2 MILLIGRAM(S): at 13:59

## 2023-04-26 NOTE — BH INPATIENT PSYCHIATRY DISCHARGE NOTE - NSDCMRMEDTOKEN_GEN_ALL_CORE_FT
LORazepam 2 mg oral tablet: 1 tab(s) orally 3 times a day Take three times a day at 9 am, 1 pm and 7 pm MDD: 6 mg  OLANZapine 10 mg oral tablet, disintegratin tab(s) orally once a day (at bedtime) MDD: 10

## 2023-04-26 NOTE — BH DISCHARGE NOTE NURSING/SOCIAL WORK/PSYCH REHAB - NSCDUDCCRISIS_PSY_A_CORE
[facility]  1 [phone]) [facility] ([phone])  Text "WELL" to 25385  Website: www.[employer]/.National Suicide Prevention Lifeline 9 (154) 494-9877(260) 977-3344/988 Suicide and Crisis Lifeline

## 2023-04-26 NOTE — BH INPATIENT PSYCHIATRY DISCHARGE NOTE - HOSPITAL COURSE
He was initially admitted to psychiatry for concerns of catatonia and psychotic features. Within a day of being on the unit, patient had a collapse with altered mental status for which he was transferred to medicine. He was worked up for seizure disorder and 24 hr vEEg did not show any epileptiform changes nor was his neuroimaging significant for organic etiology. Work up was negative for lyme disease and other infectious etiology. He also underwent CSF testing which did not yeild any significant results.  Psychiatry consults was on board for recommendation and treatment for Catatonia was started that patient responded well too. He was transferred back to psychiatry on 04/12/23 and started on standing Ativan for treatment of Catatonia with standing antipsychotics. As patient's catatonic features cleared up, he presented as internally preoccupied and experiencing AH. He was not violent on the unit and after responding to treatment for Catatonia, he was taken off constant observation and observed to be able to manage his ADL's. His current regime is Lorazepam 2 mg three times a day with Olanzapine 10 mg nightly. He is pleasant and linear on most recent conversation. On admission suicide level was HIGH ; saw visions of dead relatives telling him to join them ; current risk level is LOW on medications Ativan and Zyprexa in the therapeutic milieu. Safet planning completed prior to discharge.

## 2023-04-26 NOTE — BH INPATIENT PSYCHIATRY DISCHARGE NOTE - ATTENDING DISCHARGE PHYSICAL EXAMINATION:
MSE- Well groomed and related, good EC. -PMR/A, Speech: wnl Mood: "OK" Affect: full-range, appropriate TP: linear TC: -SI/HI/AH/VH/PI. I&J: fair

## 2023-04-26 NOTE — BH DISCHARGE NOTE NURSING/SOCIAL WORK/PSYCH REHAB - NSDCPRGOAL_PSY_ALL_CORE
Pt attended select groups during admission.  Upon admission, pt was oddly related and frequently kept to himself.  Pt was not very communicative at that time.  Recently, pt has demonstrated gains in mood and communicativeness.  Pt has been more visible on the unit, more interactive and has demonstrated broader range of affect.  Pt has been future focused and looking forward to leaving the hospital.  Pt completed a safety plan.

## 2023-04-26 NOTE — BH DISCHARGE NOTE NURSING/SOCIAL WORK/PSYCH REHAB - PATIENT PORTAL LINK FT
You can access the FollowMyHealth Patient Portal offered by [facility] by registering at the following website: [email or web address]. By joining [employer]’s FollowMyHealth portal, you will also be able to view your health information using other applications (apps) compatible with our system.

## 2023-04-26 NOTE — BH INPATIENT PSYCHIATRY DISCHARGE NOTE - NSDCCPCAREPLAN_GEN_ALL_CORE_FT
PRINCIPAL DISCHARGE DIAGNOSIS  Diagnosis: Catatonia  Assessment and Plan of Treatment:      PRINCIPAL DISCHARGE DIAGNOSIS  Diagnosis: Catatonia  Assessment and Plan of Treatment:       SECONDARY DISCHARGE DIAGNOSES  Diagnosis: Psychosis  Assessment and Plan of Treatment:

## 2023-04-26 NOTE — BH DISCHARGE NOTE NURSING/SOCIAL WORK/PSYCH REHAB - NSDPACMPNY_GEN_ALL_CORE
Pt arrives stating she was having seizures in her sleep, BF reported this to pt. Pt states she never fully woke up. Does have a hx of seizures and states compliance with medication. Pt states she does have seizures in her sleep. Pt will be accompanied home by brother Dat Reeder [phone]/Family

## 2023-04-26 NOTE — BH INPATIENT PSYCHIATRY DISCHARGE NOTE - DESCRIPTION
He was born and raised in [facility]. He is  and his wife is back in [facility], while he lives [place] the [place] with his parents and brothers. He works part time in construction and as a super attendant.

## 2023-04-26 NOTE — BH INPATIENT PSYCHIATRY DISCHARGE NOTE - HPI (INCLUDE ILLNESS QUALITY, SEVERITY, DURATION, TIMING, CONTEXT, MODIFYING FACTORS, ASSOCIATED SIGNS AND SYMPTOMS)
Patient interviewed with assistance of  line as patient is predominately Anguillan speaking. Writer has previously assessed patient on consult services and part of the information in this note is taken from my consult note.  He is a 27 year old man from [facility] presents with low motor activity, rigidity, hypertonia, and delayed responses to questions about why he is in the hospital - i.e., responded with a deep sigh, and searching eyes when questioned about this by us (in his native language, using  services). At times, he responded to questions involubly, and his speech was hard to make out at the bedside - the  said some responses were gibberish. Lack of response to questions about paranoia or auditory hallucinations. Responses were limited to one or a few words, and appropriate to the topic. While on the phone with the , he asked about his family in [facility] - there was perseveration on the topic of his family.  He shared the name of his wife, and details about his cross tattoo, which he said he got with his wife. He tracked individuals in the room with his eyes and cooperated with physical exam maneuvers. Patient did not complain of pain. Eye contact was poor at times, decreased blinking rate, and speech was delayed. His facial expression reflected grimacing and occasional face or leg rubbing or holding was observed. Postures were held for extended periods of time (minute or more) and it was difficult for him to relax from a sitting position to lying back in the bed. Waxy flexibility, excitement, combativeness, impulsivity were absent. There were no autonomic abnormalities.  Per my conversation with Brother Garcia Mukherjeeindia ([phone]) on 04.07.23: Patient had reported to brother that he had been feeling "down or low" for the last 1 year with increase in isolative and guarded behavior. The brother blames much of the behavior changes on the father's drinking and states that, 'He (the father) causes him a lot of stress". Per brother, patient also "runs away from home" and "will spend the night at our other brother's home". He reports that the patient has been "secretive, not like big things but will keep things from us in the last year. he didn't use to do that before". In regards to safety, brother states, "He has never tried to hurt himself but he said to me, I don't know if I can live like this a few times" with the context being the fights (verbal) patient has at home with the father. Brother denies any past psychiatric history in patient or in family and reports that the patient kept up with the ADL's during the last year. The day before patient was brought to the hospital, he started to say, "People are following me or listening to me through the phone".    Patient seen 20-30 minutes after Ativan administration: He was sitting in the chair and looked up as entered the room. He was perseverative of getting his Brother Emmett's number and when I offered him Alesander's number he seemed to no recognize it. Of Note, he had managed to remember 5 digits of his brother Emmett's number but was missing the last few digits. He was also trying to write out the number and was moving more spontaneously. While his motor symptoms were noticeably improved, he appeared distractible and unable to maintain a linear conversation.

## 2023-04-26 NOTE — BH INPATIENT PSYCHIATRY DISCHARGE NOTE - NSBHMETABOLIC_PSY_ALL_CORE_FT
BMI: BMI (kg/m2): 26.9 (04-18-23 @ 16:50)  HbA1c: A1C with Estimated Average Glucose Result: 5.1 % (04-14-23 @ 07:32)    Glucose: POCT Blood Glucose.: 120 mg/dL (04-08-23 @ 22:15)    BP: 139/95 (04-26-23 @ 08:46) (123/85 - 145/96)  Lipid Panel: Date/Time: 04-14-23 @ 07:32  Cholesterol, Serum: 118  Direct LDL: --  HDL Cholesterol, Serum: 24  Total Cholesterol/HDL Ration Measurement: --  Triglycerides, Serum: 87

## 2023-04-26 NOTE — BH DISCHARGE NOTE NURSING/SOCIAL WORK/PSYCH REHAB - NSDCADDINFO1FT_PSY_ALL_CORE
Appointment scheduled for Friday, April 28th at 11am. This is a VIRTUAL appointment. Zoom ID: [id number]. Self-pay rate $600 per hour, $450 per 45mins and $300 per ½ HR.

## 2023-04-26 NOTE — BH INPATIENT PSYCHIATRY DISCHARGE NOTE - OTHER PAST PSYCHIATRIC HISTORY (INCLUDE DETAILS REGARDING ONSET, COURSE OF ILLNESS, INPATIENT/OUTPATIENT TREATMENT)
The patient is a 28 yo male living with his parents. No formal psychiatric hx, no known previous hospitalizations, no suicide attempts, not currently in treatment or taking medications. No drug/etoh abuse hx. No known legal hx. Patient brought in my family-brother to [facility] ED for bizarre behavior for the past several days. Noted to be paranoid in ED, utox negative for all substances. Head CT unremarkable. Pt admitted to [facility] for psychosis.

## 2023-04-26 NOTE — BH INPATIENT PSYCHIATRY DISCHARGE NOTE - REASON FOR ADMISSION
Patient was brought in by family due to withdrawal and moving slowing. He was also behaving more differently than himself and looking depressed for the last 1 year. Patient lives with family, wife is in [place], no children and was employed uptil recently.

## 2023-04-28 DIAGNOSIS — F20.9 SCHIZOPHRENIA, UNSPECIFIED: ICD-10-CM

## 2023-04-28 DIAGNOSIS — F06.1 CATATONIC DISORDER DUE TO KNOWN PHYSIOLOGICAL CONDITION: ICD-10-CM

## 2023-04-28 DIAGNOSIS — F29 UNSPECIFIED PSYCHOSIS NOT DUE TO A SUBSTANCE OR KNOWN PHYSIOLOGICAL CONDITION: ICD-10-CM

## 2023-05-01 NOTE — BH SOCIAL WORK CONFIRMATION FOLLOW UP NOTE - NSCOMMENTS_PSY_ALL_CORE
Linkage call made to provider above who stated pt attended their appt. Pt low SI risk while admitted thus, no caring call needed.

## 2023-06-14 NOTE — BH SOCIAL WORK CONFIRMATION FOLLOW UP NOTE - NSLINKOCCUR_PSY_ALL_CORE
Within 7 Days Of Discharge Cosentyx Counseling:  I discussed with the patient the risks of Cosentyx including but not limited to worsening of Crohn's disease, immunosuppression, allergic reactions and infections.  The patient understands that monitoring is required including a PPD at baseline and must alert us or the primary physician if symptoms of infection or other concerning signs are noted.

## 2023-08-10 NOTE — ED ADULT NURSE NOTE - GASTROINTESTINAL ASSESSMENT
Problem: Adult Inpatient Plan of Care  Goal: Plan of Care Review  Outcome: Ongoing, Progressing  Goal: Patient-Specific Goal (Individualized)  Outcome: Ongoing, Progressing  Goal: Absence of Hospital-Acquired Illness or Injury  Outcome: Ongoing, Progressing  Goal: Optimal Comfort and Wellbeing  Outcome: Ongoing, Progressing  Goal: Readiness for Transition of Care  Outcome: Ongoing, Progressing     Problem: Diabetes Comorbidity  Goal: Blood Glucose Level Within Targeted Range  Outcome: Ongoing, Progressing     Problem: Skin Injury Risk Increased  Goal: Skin Health and Integrity  Outcome: Ongoing, Progressing      WDL

## 2023-11-07 NOTE — ED ADULT NURSE NOTE - NS ED TRIAGE CLINICAL UPGRADE
Deteriorating patient status - Patient was clinically upgraded due to deteriorating patient status. H

## 2023-12-04 NOTE — BH PATIENT PROFILE - PATIENT REPRESENTATIVE: ( YOU CAN CHOOSE ANY PERSON THAT CAN ASSIST YOU WITH YOUR HEALTH CARE PREFERENCES, DOES NOT HAVE TO BE A SPOUSE, IMMEDIATE FAMILY OR SIGNIFICANT OTHER/PARTNER)
Pt requesting refills    Allopurinol 300mg daily  Last refill 9/5/23    Pioglitazone 30mg daily  Last refill- D/C'd by Dr. Brito on 10/20/23     [phone]  Last appt 11/7/23  No future appt has been scheduled    declines

## 2024-03-11 NOTE — BH PATIENT PROFILE - FUNCTIONAL ASSESSMENT - BASIC MOBILITY SCORE.
[Takes medication as prescribed] : takes [None] : Patient does not have any barriers to medication adherence [No] : Did not review medication list for presence of high-risk medications. 10

## 2024-03-27 ENCOUNTER — EMERGENCY (EMERGENCY)
Facility: HOSPITAL | Age: 29
LOS: 1 days | Discharge: HOME | End: 2024-03-27
Admitting: EMERGENCY MEDICINE
Payer: SELF-PAY

## 2024-03-27 VITALS
SYSTOLIC BLOOD PRESSURE: 142 MMHG | RESPIRATION RATE: 18 BRPM | OXYGEN SATURATION: 99 % | TEMPERATURE: 98 F | HEIGHT: 69 IN | WEIGHT: 164.91 LBS | DIASTOLIC BLOOD PRESSURE: 64 MMHG | HEART RATE: 83 BPM

## 2024-03-27 LAB
FLUAV AG NPH QL: SIGNIFICANT CHANGE UP
FLUBV AG NPH QL: DETECTED
RSV RNA NPH QL NAA+NON-PROBE: SIGNIFICANT CHANGE UP
SARS-COV-2 RNA SPEC QL NAA+PROBE: SIGNIFICANT CHANGE UP

## 2024-03-27 PROCEDURE — 99284 EMERGENCY DEPT VISIT MOD MDM: CPT

## 2024-03-27 RX ORDER — FLUTICASONE PROPIONATE 50 MCG
1 SPRAY, SUSPENSION NASAL
Qty: 1 | Refills: 0
Start: 2024-03-27 | End: 2024-04-02

## 2024-03-27 NOTE — ED ADULT NURSE NOTE - OBJECTIVE STATEMENT
Patient is a 29 y/o M c/o cough. patient reports cough and congestion for the past week. Patient denies fever or chills. patient denies pmhx.   Ayden [id number]

## 2024-03-27 NOTE — ED PROVIDER NOTE - OBJECTIVE STATEMENT
27 yo M, no pmh, presents to this ED for cough, congestion, subjective fevers x 4 days. Tried Alieve without relief of symptoms. Is tolerating food and liquids well. No changes to urinary patterns or bowel movement patterns. Denies n/v/d, constipation, abd pain, neck stiffness, SOB, CP.

## 2024-03-27 NOTE — ED PROVIDER NOTE - PATIENT PORTAL LINK FT
You can access the FollowMyHealth Patient Portal offered by [facility] by registering at the following website: [email or web address]. By joining [facility]’s FollowMyHealth portal, you will also be able to view your health information using other applications (apps) compatible with our system.

## 2024-03-27 NOTE — ED PROVIDER NOTE - CLINICAL SUMMARY MEDICAL DECISION MAKING FREE TEXT BOX
According to PERC rules - pt is low risk for PE  According to Well's Criteria - Pt is very low risk for DVT  Centor Criteria not met - bacterial pharyngitis unlikely  No voice changes, unilateral tonsillar swelling - retropharyngeal/ peritonsillar abscess unlikely  COVID/Influenza/RSV swab ordered to rule out/in RSV, Influenza A and/or B.  No overt signs of dehydration noted, mucus membranes moist, no skin tenting present, cap refill <2 sec, VSS.

## 2024-03-27 NOTE — ED PROVIDER NOTE - PROGRESS NOTE DETAILS
Patient tested positive for the flu  Patient stable for discharge  Consults with patient.  Patient understands and agrees with plan.  Agreed to follow primary care doctor in 2 to 3 days.

## 2024-03-29 DIAGNOSIS — R05.1 ACUTE COUGH: ICD-10-CM

## 2024-03-29 DIAGNOSIS — Z20.822 CONTACT WITH AND (SUSPECTED) EXPOSURE TO COVID-19: ICD-10-CM

## 2024-03-29 DIAGNOSIS — J10.1 INFLUENZA DUE TO OTHER IDENTIFIED INFLUENZA VIRUS WITH OTHER RESPIRATORY MANIFESTATIONS: ICD-10-CM

## 2024-06-10 NOTE — BH CONSULTATION LIAISON PROGRESS NOTE - NSBHFUPINTERVALCCFT_PSY_A_CORE
Follow up with Dr. Sanchez
Patient seen in follow up for catatonia
"I'm feeling sad and tired."
Patient seen in follow up for catatonia
Patient seen in follow up for catatonia

## 2024-12-03 NOTE — ED ADULT NURSE NOTE - IN THE PAST 12 MONTHS HAVE YOU USED DRUGS OTHER THAN THOSE REQUIRED FOR MEDICAL REASON?
Attempted to contact pt to make aware of imaging scheduled. No answer--left message.       ----- Message from Jessica Burks PA-C sent at 12/3/2024  3:49 PM CST -----  Can you please schedule the ordered breast imaging? Thanks!    Jessica Burks PA-C  [facility]   No

## 2025-01-02 NOTE — DIETITIAN INITIAL EVALUATION ADULT - PERTINENT MEDS FT
Report from Sending RN:    Report From: Torsten  Recent Surgery of Procedure: Yes  Baseline Level of Consciousness (LOC): A+Ox4  Oxygen Use: No  Type: RA  Diabetic: Yes  Last BP Med Given Day of Dialysis: 0850  Last Pain Med Given: yesterday  Lab Tests to be Obtained with Dialysis: No  Blood Transfusion to be Given During Dialysis: N/A  Available IV Access: Yes, midline R upper  Medications to be Administered During Dialysis: No  Continuous IV Infusion Running: No  Restraints on Currently or in the Last 24 Hours: No  Hand-Off Communication: Pt in stable condition, am meds given.   Dialysis Catheter Dressing: n/a  Last Dressing Change: n/a    MEDICATIONS  (STANDING):  LORazepam   Injectable 2 milliGRAM(s) IntraMuscular <User Schedule>  OLANZapine Disintegrating Tablet 5 milliGRAM(s) Oral <User Schedule>    MEDICATIONS  (PRN):  acetaminophen     Tablet .. 650 milliGRAM(s) Oral every 6 hours PRN Moderate Pain (4 - 6)  aluminum hydroxide/magnesium hydroxide/simethicone Suspension 30 milliLiter(s) Oral every 4 hours PRN Dyspepsia  OLANZapine 5 milliGRAM(s) Oral every 8 hours PRN Agitation  traZODone 50 milliGRAM(s) Oral at bedtime PRN Sleep

## 2025-02-07 NOTE — BH INPATIENT PSYCHIATRY DISCHARGE NOTE - NSDCPROCEDURES_PSY_ALL_CORE
Creatine stable for now. BMP reviewed- noted Estimated Creatinine Clearance: 56.3 mL/min (A) (based on SCr of 2 mg/dL (H)). according to latest data. Based on current GFR, CKD stage is stage 3 - GFR 30-59.  Monitor UOP and serial BMP and adjust therapy as needed. Renally dose meds. Avoid nephrotoxic medications and procedures.   Significant procedures or tests performed during this admission,